# Patient Record
Sex: MALE | Race: WHITE | NOT HISPANIC OR LATINO | Employment: UNEMPLOYED | URBAN - METROPOLITAN AREA
[De-identification: names, ages, dates, MRNs, and addresses within clinical notes are randomized per-mention and may not be internally consistent; named-entity substitution may affect disease eponyms.]

---

## 2021-05-29 ENCOUNTER — RECORDS - HEALTHEAST (OUTPATIENT)
Dept: ADMINISTRATIVE | Facility: CLINIC | Age: 51
End: 2021-05-29

## 2022-02-09 ENCOUNTER — HOSPITAL ENCOUNTER (INPATIENT)
Facility: CLINIC | Age: 52
LOS: 7 days | Discharge: HOME HEALTH | DRG: 453 | End: 2022-02-16
Attending: EMERGENCY MEDICINE | Admitting: ORTHOPAEDIC SURGERY
Payer: COMMERCIAL

## 2022-02-09 ENCOUNTER — APPOINTMENT (OUTPATIENT)
Dept: GENERAL RADIOLOGY | Facility: CLINIC | Age: 52
DRG: 453 | End: 2022-02-09
Attending: ORTHOPAEDIC SURGERY
Payer: COMMERCIAL

## 2022-02-09 ENCOUNTER — ANESTHESIA EVENT (OUTPATIENT)
Dept: SURGERY | Facility: CLINIC | Age: 52
DRG: 453 | End: 2022-02-09
Payer: COMMERCIAL

## 2022-02-09 ENCOUNTER — ANESTHESIA (OUTPATIENT)
Dept: SURGERY | Facility: CLINIC | Age: 52
DRG: 453 | End: 2022-02-09
Payer: COMMERCIAL

## 2022-02-09 ENCOUNTER — TELEPHONE (OUTPATIENT)
Dept: ORTHOPEDICS | Facility: CLINIC | Age: 52
End: 2022-02-09

## 2022-02-09 DIAGNOSIS — M46.42 DISCITIS OF CERVICAL REGION: ICD-10-CM

## 2022-02-09 DIAGNOSIS — Z11.52 ENCOUNTER FOR SCREENING LABORATORY TESTING FOR SEVERE ACUTE RESPIRATORY SYNDROME CORONAVIRUS 2 (SARS-COV-2): ICD-10-CM

## 2022-02-09 DIAGNOSIS — G06.2 EPIDURAL ABSCESS: ICD-10-CM

## 2022-02-09 DIAGNOSIS — M47.12 CERVICAL SPONDYLOSIS WITH MYELOPATHY: ICD-10-CM

## 2022-02-09 DIAGNOSIS — M47.12 CERVICAL SPONDYLOSIS WITH MYELOPATHY: Primary | ICD-10-CM

## 2022-02-09 DIAGNOSIS — M46.42 CERVICAL DISCITIS: Primary | ICD-10-CM

## 2022-02-09 LAB
ABO/RH(D): NORMAL
ALBUMIN SERPL-MCNC: 2.8 G/DL (ref 3.4–5)
ALP SERPL-CCNC: 133 U/L (ref 40–150)
ALT SERPL W P-5'-P-CCNC: 44 U/L (ref 0–70)
ANION GAP SERPL CALCULATED.3IONS-SCNC: 5 MMOL/L (ref 3–14)
ANTIBODY SCREEN: NEGATIVE
AST SERPL W P-5'-P-CCNC: 12 U/L (ref 0–45)
BASOPHILS # BLD AUTO: 0.1 10E3/UL (ref 0–0.2)
BASOPHILS NFR BLD AUTO: 1 %
BILIRUB SERPL-MCNC: 0.3 MG/DL (ref 0.2–1.3)
BUN SERPL-MCNC: 12 MG/DL (ref 7–30)
CALCIUM SERPL-MCNC: 9.8 MG/DL (ref 8.5–10.1)
CHLORIDE BLD-SCNC: 102 MMOL/L (ref 94–109)
CO2 SERPL-SCNC: 30 MMOL/L (ref 20–32)
CREAT SERPL-MCNC: 0.83 MG/DL (ref 0.66–1.25)
CRP SERPL-MCNC: 74 MG/L (ref 0–8)
EOSINOPHIL # BLD AUTO: 0.2 10E3/UL (ref 0–0.7)
EOSINOPHIL NFR BLD AUTO: 1 %
ERYTHROCYTE [DISTWIDTH] IN BLOOD BY AUTOMATED COUNT: 12.7 % (ref 10–15)
GFR SERPL CREATININE-BSD FRML MDRD: >90 ML/MIN/1.73M2
GLUCOSE BLD-MCNC: 130 MG/DL (ref 70–99)
GLUCOSE BLDC GLUCOMTR-MCNC: 100 MG/DL (ref 70–99)
HCT VFR BLD AUTO: 38.5 % (ref 40–53)
HGB BLD-MCNC: 12.1 G/DL (ref 13.3–17.7)
HOLD SPECIMEN: NORMAL
IMM GRANULOCYTES # BLD: 0.1 10E3/UL
IMM GRANULOCYTES NFR BLD: 1 %
INR PPP: 0.98 (ref 0.85–1.15)
LACTATE SERPL-SCNC: 1.5 MMOL/L (ref 0.7–2)
LYMPHOCYTES # BLD AUTO: 3.8 10E3/UL (ref 0.8–5.3)
LYMPHOCYTES NFR BLD AUTO: 27 %
MCH RBC QN AUTO: 27.7 PG (ref 26.5–33)
MCHC RBC AUTO-ENTMCNC: 31.4 G/DL (ref 31.5–36.5)
MCV RBC AUTO: 88 FL (ref 78–100)
MONOCYTES # BLD AUTO: 1 10E3/UL (ref 0–1.3)
MONOCYTES NFR BLD AUTO: 7 %
NEUTROPHILS # BLD AUTO: 8.8 10E3/UL (ref 1.6–8.3)
NEUTROPHILS NFR BLD AUTO: 63 %
NRBC # BLD AUTO: 0 10E3/UL
NRBC BLD AUTO-RTO: 0 /100
PLATELET # BLD AUTO: 733 10E3/UL (ref 150–450)
POTASSIUM BLD-SCNC: 3.4 MMOL/L (ref 3.4–5.3)
PROCALCITONIN SERPL-MCNC: 0.06 NG/ML
PROT SERPL-MCNC: 8.5 G/DL (ref 6.8–8.8)
RBC # BLD AUTO: 4.37 10E6/UL (ref 4.4–5.9)
SARS-COV-2 RNA RESP QL NAA+PROBE: NEGATIVE
SODIUM SERPL-SCNC: 137 MMOL/L (ref 133–144)
SPECIMEN EXPIRATION DATE: NORMAL
WBC # BLD AUTO: 13.9 10E3/UL (ref 4–11)

## 2022-02-09 PROCEDURE — 272N000001 HC OR GENERAL SUPPLY STERILE: Performed by: ORTHOPAEDIC SURGERY

## 2022-02-09 PROCEDURE — 0RB30ZZ EXCISION OF CERVICAL VERTEBRAL DISC, OPEN APPROACH: ICD-10-PCS | Performed by: ORTHOPAEDIC SURGERY

## 2022-02-09 PROCEDURE — 80053 COMPREHEN METABOLIC PANEL: CPT | Performed by: EMERGENCY MEDICINE

## 2022-02-09 PROCEDURE — 36415 COLL VENOUS BLD VENIPUNCTURE: CPT | Performed by: EMERGENCY MEDICINE

## 2022-02-09 PROCEDURE — 87077 CULTURE AEROBIC IDENTIFY: CPT | Performed by: EMERGENCY MEDICINE

## 2022-02-09 PROCEDURE — 710N000010 HC RECOVERY PHASE 1, LEVEL 2, PER MIN: Performed by: ORTHOPAEDIC SURGERY

## 2022-02-09 PROCEDURE — 0RG60A0 FUSION OF THORACIC VERTEBRAL JOINT WITH INTERBODY FUSION DEVICE, ANTERIOR APPROACH, ANTERIOR COLUMN, OPEN APPROACH: ICD-10-PCS | Performed by: ORTHOPAEDIC SURGERY

## 2022-02-09 PROCEDURE — 999N000141 HC STATISTIC PRE-PROCEDURE NURSING ASSESSMENT: Performed by: ORTHOPAEDIC SURGERY

## 2022-02-09 PROCEDURE — 36415 COLL VENOUS BLD VENIPUNCTURE: CPT | Performed by: ANESTHESIOLOGY

## 2022-02-09 PROCEDURE — 0RG10A0 FUSION OF CERVICAL VERTEBRAL JOINT WITH INTERBODY FUSION DEVICE, ANTERIOR APPROACH, ANTERIOR COLUMN, OPEN APPROACH: ICD-10-PCS | Performed by: ORTHOPAEDIC SURGERY

## 2022-02-09 PROCEDURE — 87149 DNA/RNA DIRECT PROBE: CPT | Performed by: EMERGENCY MEDICINE

## 2022-02-09 PROCEDURE — 999N000179 XR SURGERY CARM FLUORO LESS THAN 5 MIN W STILLS

## 2022-02-09 PROCEDURE — 22216 INCIS ADDL SPINE SEGMENT: CPT | Mod: 58 | Performed by: ORTHOPAEDIC SURGERY

## 2022-02-09 PROCEDURE — 250N000011 HC RX IP 250 OP 636: Performed by: PHYSICIAN ASSISTANT

## 2022-02-09 PROCEDURE — 83605 ASSAY OF LACTIC ACID: CPT | Performed by: PHYSICIAN ASSISTANT

## 2022-02-09 PROCEDURE — 85610 PROTHROMBIN TIME: CPT | Performed by: STUDENT IN AN ORGANIZED HEALTH CARE EDUCATION/TRAINING PROGRAM

## 2022-02-09 PROCEDURE — 258N000003 HC RX IP 258 OP 636: Performed by: PHYSICIAN ASSISTANT

## 2022-02-09 PROCEDURE — 250N000011 HC RX IP 250 OP 636: Performed by: ANESTHESIOLOGY

## 2022-02-09 PROCEDURE — 0RB50ZZ EXCISION OF CERVICOTHORACIC VERTEBRAL DISC, OPEN APPROACH: ICD-10-PCS | Performed by: ORTHOPAEDIC SURGERY

## 2022-02-09 PROCEDURE — C1713 ANCHOR/SCREW BN/BN,TIS/BN: HCPCS | Performed by: ORTHOPAEDIC SURGERY

## 2022-02-09 PROCEDURE — 96374 THER/PROPH/DIAG INJ IV PUSH: CPT

## 2022-02-09 PROCEDURE — 258N000003 HC RX IP 258 OP 636: Performed by: STUDENT IN AN ORGANIZED HEALTH CARE EDUCATION/TRAINING PROGRAM

## 2022-02-09 PROCEDURE — 360N000078 HC SURGERY LEVEL 5, PER MIN: Performed by: ORTHOPAEDIC SURGERY

## 2022-02-09 PROCEDURE — 272N000002 HC OR SUPPLY OTHER OPNP: Performed by: ORTHOPAEDIC SURGERY

## 2022-02-09 PROCEDURE — 86850 RBC ANTIBODY SCREEN: CPT | Performed by: ANESTHESIOLOGY

## 2022-02-09 PROCEDURE — 250N000009 HC RX 250: Performed by: STUDENT IN AN ORGANIZED HEALTH CARE EDUCATION/TRAINING PROGRAM

## 2022-02-09 PROCEDURE — 85025 COMPLETE CBC W/AUTO DIFF WBC: CPT | Performed by: EMERGENCY MEDICINE

## 2022-02-09 PROCEDURE — 120N000002 HC R&B MED SURG/OB UMMC

## 2022-02-09 PROCEDURE — C9803 HOPD COVID-19 SPEC COLLECT: HCPCS

## 2022-02-09 PROCEDURE — 87176 TISSUE HOMOGENIZATION CULTR: CPT | Performed by: ORTHOPAEDIC SURGERY

## 2022-02-09 PROCEDURE — 87075 CULTR BACTERIA EXCEPT BLOOD: CPT | Performed by: ORTHOPAEDIC SURGERY

## 2022-02-09 PROCEDURE — 96376 TX/PRO/DX INJ SAME DRUG ADON: CPT

## 2022-02-09 PROCEDURE — 250N000013 HC RX MED GY IP 250 OP 250 PS 637: Performed by: ORTHOPAEDIC SURGERY

## 2022-02-09 PROCEDURE — 250N000025 HC SEVOFLURANE, PER MIN: Performed by: ORTHOPAEDIC SURGERY

## 2022-02-09 PROCEDURE — 96375 TX/PRO/DX INJ NEW DRUG ADDON: CPT

## 2022-02-09 PROCEDURE — 370N000017 HC ANESTHESIA TECHNICAL FEE, PER MIN: Performed by: ORTHOPAEDIC SURGERY

## 2022-02-09 PROCEDURE — 36415 COLL VENOUS BLD VENIPUNCTURE: CPT | Performed by: PHYSICIAN ASSISTANT

## 2022-02-09 PROCEDURE — 99285 EMERGENCY DEPT VISIT HI MDM: CPT | Performed by: EMERGENCY MEDICINE

## 2022-02-09 PROCEDURE — 0QB20ZZ EXCISION OF RIGHT PELVIC BONE, OPEN APPROACH: ICD-10-PCS | Performed by: ORTHOPAEDIC SURGERY

## 2022-02-09 PROCEDURE — 87077 CULTURE AEROBIC IDENTIFY: CPT | Performed by: ORTHOPAEDIC SURGERY

## 2022-02-09 PROCEDURE — 0RG40A0 FUSION OF CERVICOTHORACIC VERTEBRAL JOINT WITH INTERBODY FUSION DEVICE, ANTERIOR APPROACH, ANTERIOR COLUMN, OPEN APPROACH: ICD-10-PCS | Performed by: ORTHOPAEDIC SURGERY

## 2022-02-09 PROCEDURE — 99285 EMERGENCY DEPT VISIT HI MDM: CPT | Mod: 25

## 2022-02-09 PROCEDURE — 87070 CULTURE OTHR SPECIMN AEROBIC: CPT | Performed by: ORTHOPAEDIC SURGERY

## 2022-02-09 PROCEDURE — 250N000011 HC RX IP 250 OP 636: Performed by: STUDENT IN AN ORGANIZED HEALTH CARE EDUCATION/TRAINING PROGRAM

## 2022-02-09 PROCEDURE — 87205 SMEAR GRAM STAIN: CPT | Performed by: ORTHOPAEDIC SURGERY

## 2022-02-09 PROCEDURE — U0003 INFECTIOUS AGENT DETECTION BY NUCLEIC ACID (DNA OR RNA); SEVERE ACUTE RESPIRATORY SYNDROME CORONAVIRUS 2 (SARS-COV-2) (CORONAVIRUS DISEASE [COVID-19]), AMPLIFIED PROBE TECHNIQUE, MAKING USE OF HIGH THROUGHPUT TECHNOLOGIES AS DESCRIBED BY CMS-2020-01-R: HCPCS | Performed by: EMERGENCY MEDICINE

## 2022-02-09 PROCEDURE — 86140 C-REACTIVE PROTEIN: CPT | Performed by: PHYSICIAN ASSISTANT

## 2022-02-09 PROCEDURE — 250N000011 HC RX IP 250 OP 636: Performed by: EMERGENCY MEDICINE

## 2022-02-09 PROCEDURE — 0P933ZX DRAINAGE OF CERVICAL VERTEBRA, PERCUTANEOUS APPROACH, DIAGNOSTIC: ICD-10-PCS | Performed by: ORTHOPAEDIC SURGERY

## 2022-02-09 PROCEDURE — 258N000003 HC RX IP 258 OP 636: Performed by: EMERGENCY MEDICINE

## 2022-02-09 PROCEDURE — 0RB90ZZ EXCISION OF THORACIC VERTEBRAL DISC, OPEN APPROACH: ICD-10-PCS | Performed by: ORTHOPAEDIC SURGERY

## 2022-02-09 PROCEDURE — 84145 PROCALCITONIN (PCT): CPT | Performed by: PHYSICIAN ASSISTANT

## 2022-02-09 RX ORDER — CEFAZOLIN SODIUM 1 G/50ML
2 SOLUTION INTRAVENOUS
Status: CANCELLED | OUTPATIENT
Start: 2022-02-09

## 2022-02-09 RX ORDER — DEXTROAMPHETAMINE SACCHARATE, AMPHETAMINE ASPARTATE MONOHYDRATE, DEXTROAMPHETAMINE SULFATE AND AMPHETAMINE SULFATE 7.5; 7.5; 7.5; 7.5 MG/1; MG/1; MG/1; MG/1
30 CAPSULE, EXTENDED RELEASE ORAL EVERY MORNING
COMMUNITY
Start: 2021-12-29

## 2022-02-09 RX ORDER — IOPAMIDOL 755 MG/ML
75 INJECTION, SOLUTION INTRAVASCULAR ONCE
Status: DISCONTINUED | OUTPATIENT
Start: 2022-02-09 | End: 2022-02-10

## 2022-02-09 RX ORDER — LORAZEPAM 2 MG/ML
1 INJECTION INTRAMUSCULAR ONCE
Status: DISCONTINUED | OUTPATIENT
Start: 2022-02-09 | End: 2022-02-10

## 2022-02-09 RX ORDER — GABAPENTIN 300 MG/1
300 CAPSULE ORAL
Status: CANCELLED | OUTPATIENT
Start: 2022-02-09

## 2022-02-09 RX ORDER — CEFAZOLIN SODIUM 2 G/100ML
INJECTION, SOLUTION INTRAVENOUS PRN
Status: DISCONTINUED | OUTPATIENT
Start: 2022-02-09 | End: 2022-02-09

## 2022-02-09 RX ORDER — ACETAMINOPHEN 325 MG/1
975 TABLET ORAL ONCE
Status: COMPLETED | OUTPATIENT
Start: 2022-02-09 | End: 2022-02-09

## 2022-02-09 RX ORDER — DEXAMETHASONE SODIUM PHOSPHATE 4 MG/ML
INJECTION, SOLUTION INTRA-ARTICULAR; INTRALESIONAL; INTRAMUSCULAR; INTRAVENOUS; SOFT TISSUE PRN
Status: DISCONTINUED | OUTPATIENT
Start: 2022-02-09 | End: 2022-02-10

## 2022-02-09 RX ORDER — FENTANYL CITRATE 50 UG/ML
25 INJECTION, SOLUTION INTRAMUSCULAR; INTRAVENOUS EVERY 5 MIN PRN
Status: DISCONTINUED | OUTPATIENT
Start: 2022-02-09 | End: 2022-02-10 | Stop reason: HOSPADM

## 2022-02-09 RX ORDER — HYDROMORPHONE HCL IN WATER/PF 6 MG/30 ML
0.5 PATIENT CONTROLLED ANALGESIA SYRINGE INTRAVENOUS
Status: COMPLETED | OUTPATIENT
Start: 2022-02-09 | End: 2022-02-09

## 2022-02-09 RX ORDER — OXYCODONE HYDROCHLORIDE 5 MG/1
5 TABLET ORAL EVERY 4 HOURS PRN
Status: DISCONTINUED | OUTPATIENT
Start: 2022-02-09 | End: 2022-02-10 | Stop reason: HOSPADM

## 2022-02-09 RX ORDER — LIDOCAINE HYDROCHLORIDE 20 MG/ML
INJECTION, SOLUTION INFILTRATION; PERINEURAL PRN
Status: DISCONTINUED | OUTPATIENT
Start: 2022-02-09 | End: 2022-02-10

## 2022-02-09 RX ORDER — FENTANYL CITRATE 50 UG/ML
100 INJECTION, SOLUTION INTRAMUSCULAR; INTRAVENOUS ONCE
Status: COMPLETED | OUTPATIENT
Start: 2022-02-09 | End: 2022-02-09

## 2022-02-09 RX ORDER — HYDROMORPHONE HCL IN WATER/PF 6 MG/30 ML
0.2 PATIENT CONTROLLED ANALGESIA SYRINGE INTRAVENOUS EVERY 5 MIN PRN
Status: DISCONTINUED | OUTPATIENT
Start: 2022-02-09 | End: 2022-02-10 | Stop reason: HOSPADM

## 2022-02-09 RX ORDER — FENTANYL CITRATE 50 UG/ML
50 INJECTION, SOLUTION INTRAMUSCULAR; INTRAVENOUS ONCE
Status: COMPLETED | OUTPATIENT
Start: 2022-02-09 | End: 2022-02-09

## 2022-02-09 RX ORDER — PROPOFOL 10 MG/ML
INJECTION, EMULSION INTRAVENOUS PRN
Status: DISCONTINUED | OUTPATIENT
Start: 2022-02-09 | End: 2022-02-10

## 2022-02-09 RX ORDER — LORAZEPAM 2 MG/ML
1 INJECTION INTRAMUSCULAR ONCE
Status: COMPLETED | OUTPATIENT
Start: 2022-02-09 | End: 2022-02-09

## 2022-02-09 RX ORDER — LIDOCAINE 40 MG/G
CREAM TOPICAL
Status: CANCELLED | OUTPATIENT
Start: 2022-02-09

## 2022-02-09 RX ORDER — GABAPENTIN 300 MG/1
300 CAPSULE ORAL 3 TIMES DAILY
Status: DISCONTINUED | OUTPATIENT
Start: 2022-02-09 | End: 2022-02-10

## 2022-02-09 RX ORDER — SODIUM CHLORIDE, SODIUM LACTATE, POTASSIUM CHLORIDE, CALCIUM CHLORIDE 600; 310; 30; 20 MG/100ML; MG/100ML; MG/100ML; MG/100ML
INJECTION, SOLUTION INTRAVENOUS CONTINUOUS PRN
Status: DISCONTINUED | OUTPATIENT
Start: 2022-02-09 | End: 2022-02-10

## 2022-02-09 RX ORDER — SODIUM CHLORIDE 9 MG/ML
INJECTION, SOLUTION INTRAVENOUS CONTINUOUS
Status: DISCONTINUED | OUTPATIENT
Start: 2022-02-09 | End: 2022-02-10

## 2022-02-09 RX ORDER — LABETALOL HYDROCHLORIDE 5 MG/ML
10 INJECTION, SOLUTION INTRAVENOUS
Status: DISCONTINUED | OUTPATIENT
Start: 2022-02-09 | End: 2022-02-10 | Stop reason: HOSPADM

## 2022-02-09 RX ORDER — CEFAZOLIN SODIUM 2 G/100ML
INJECTION, SOLUTION INTRAVENOUS PRN
Status: DISCONTINUED | OUTPATIENT
Start: 2022-02-09 | End: 2022-02-10

## 2022-02-09 RX ORDER — ONDANSETRON 2 MG/ML
4 INJECTION INTRAMUSCULAR; INTRAVENOUS EVERY 30 MIN PRN
Status: DISCONTINUED | OUTPATIENT
Start: 2022-02-09 | End: 2022-02-10 | Stop reason: HOSPADM

## 2022-02-09 RX ORDER — ACETAMINOPHEN 325 MG/1
975 TABLET ORAL ONCE
Status: CANCELLED | OUTPATIENT
Start: 2022-02-09 | End: 2022-02-09

## 2022-02-09 RX ORDER — ONDANSETRON 4 MG/1
4 TABLET, ORALLY DISINTEGRATING ORAL EVERY 30 MIN PRN
Status: DISCONTINUED | OUTPATIENT
Start: 2022-02-09 | End: 2022-02-10 | Stop reason: HOSPADM

## 2022-02-09 RX ORDER — PROPOFOL 10 MG/ML
INJECTION, EMULSION INTRAVENOUS CONTINUOUS PRN
Status: DISCONTINUED | OUTPATIENT
Start: 2022-02-09 | End: 2022-02-10

## 2022-02-09 RX ORDER — DIAZEPAM 10 MG/2ML
5 INJECTION, SOLUTION INTRAMUSCULAR; INTRAVENOUS ONCE
Status: COMPLETED | OUTPATIENT
Start: 2022-02-09 | End: 2022-02-09

## 2022-02-09 RX ORDER — KETAMINE HYDROCHLORIDE 10 MG/ML
INJECTION INTRAMUSCULAR; INTRAVENOUS PRN
Status: DISCONTINUED | OUTPATIENT
Start: 2022-02-09 | End: 2022-02-10

## 2022-02-09 RX ORDER — CEFAZOLIN SODIUM 1 G/50ML
2 SOLUTION INTRAVENOUS SEE ADMIN INSTRUCTIONS
Status: CANCELLED | OUTPATIENT
Start: 2022-02-09

## 2022-02-09 RX ORDER — SODIUM CHLORIDE, SODIUM LACTATE, POTASSIUM CHLORIDE, CALCIUM CHLORIDE 600; 310; 30; 20 MG/100ML; MG/100ML; MG/100ML; MG/100ML
INJECTION, SOLUTION INTRAVENOUS CONTINUOUS
Status: DISCONTINUED | OUTPATIENT
Start: 2022-02-09 | End: 2022-02-10 | Stop reason: HOSPADM

## 2022-02-09 RX ORDER — MORPHINE SULFATE 2 MG/ML
2 INJECTION, SOLUTION INTRAMUSCULAR; INTRAVENOUS ONCE
Status: COMPLETED | OUTPATIENT
Start: 2022-02-09 | End: 2022-02-09

## 2022-02-09 RX ORDER — FENTANYL CITRATE 50 UG/ML
50 INJECTION, SOLUTION INTRAMUSCULAR; INTRAVENOUS
Status: DISCONTINUED | OUTPATIENT
Start: 2022-02-09 | End: 2022-02-10

## 2022-02-09 RX ORDER — SODIUM CHLORIDE, SODIUM LACTATE, POTASSIUM CHLORIDE, CALCIUM CHLORIDE 600; 310; 30; 20 MG/100ML; MG/100ML; MG/100ML; MG/100ML
INJECTION, SOLUTION INTRAVENOUS CONTINUOUS
Status: CANCELLED | OUTPATIENT
Start: 2022-02-09

## 2022-02-09 RX ORDER — CYCLOBENZAPRINE HCL 10 MG
30 TABLET ORAL DAILY
Status: ON HOLD | COMMUNITY
Start: 2022-01-03 | End: 2022-02-10

## 2022-02-09 RX ADMIN — HYDROMORPHONE HYDROCHLORIDE 0.5 MG: 0.2 INJECTION, SOLUTION INTRAMUSCULAR; INTRAVENOUS; SUBCUTANEOUS at 16:34

## 2022-02-09 RX ADMIN — FENTANYL CITRATE 50 MCG: 50 INJECTION, SOLUTION INTRAMUSCULAR; INTRAVENOUS at 21:27

## 2022-02-09 RX ADMIN — LIDOCAINE HYDROCHLORIDE 100 MG: 20 INJECTION, SOLUTION INFILTRATION; PERINEURAL at 22:19

## 2022-02-09 RX ADMIN — ACETAMINOPHEN 975 MG: 325 TABLET, FILM COATED ORAL at 21:59

## 2022-02-09 RX ADMIN — LORAZEPAM 1 MG: 2 INJECTION INTRAMUSCULAR; INTRAVENOUS at 18:36

## 2022-02-09 RX ADMIN — MORPHINE SULFATE 2 MG: 2 INJECTION, SOLUTION INTRAMUSCULAR; INTRAVENOUS at 16:05

## 2022-02-09 RX ADMIN — DIAZEPAM 5 MG: 5 INJECTION, SOLUTION INTRAMUSCULAR; INTRAVENOUS at 16:32

## 2022-02-09 RX ADMIN — SODIUM CHLORIDE 1000 ML: 9 INJECTION, SOLUTION INTRAVENOUS at 17:29

## 2022-02-09 RX ADMIN — DEXAMETHASONE SODIUM PHOSPHATE 4 MG: 4 INJECTION, SOLUTION INTRA-ARTICULAR; INTRALESIONAL; INTRAMUSCULAR; INTRAVENOUS; SOFT TISSUE at 23:15

## 2022-02-09 RX ADMIN — FENTANYL CITRATE 50 MCG: 50 INJECTION, SOLUTION INTRAMUSCULAR; INTRAVENOUS at 17:34

## 2022-02-09 RX ADMIN — HYDROMORPHONE HYDROCHLORIDE 1 MG: 1 INJECTION, SOLUTION INTRAMUSCULAR; INTRAVENOUS; SUBCUTANEOUS at 18:30

## 2022-02-09 RX ADMIN — SUFENTANIL CITRATE 0.05 MCG/KG/HR: 50 INJECTION EPIDURAL; INTRAVENOUS at 22:22

## 2022-02-09 RX ADMIN — TRANEXAMIC ACID 2 G: 1 INJECTION, SOLUTION INTRAVENOUS at 23:35

## 2022-02-09 RX ADMIN — Medication 2 G: at 23:52

## 2022-02-09 RX ADMIN — Medication 100 MG: at 22:19

## 2022-02-09 RX ADMIN — FENTANYL CITRATE 100 MCG: 50 INJECTION, SOLUTION INTRAMUSCULAR; INTRAVENOUS at 19:57

## 2022-02-09 RX ADMIN — SODIUM CHLORIDE, POTASSIUM CHLORIDE, SODIUM LACTATE AND CALCIUM CHLORIDE: 600; 310; 30; 20 INJECTION, SOLUTION INTRAVENOUS at 22:14

## 2022-02-09 RX ADMIN — GABAPENTIN 300 MG: 300 CAPSULE ORAL at 21:59

## 2022-02-09 RX ADMIN — PHENYLEPHRINE HYDROCHLORIDE 0.3 MCG/KG/MIN: 10 INJECTION INTRAVENOUS at 23:15

## 2022-02-09 RX ADMIN — SODIUM CHLORIDE 1000 ML: 9 INJECTION, SOLUTION INTRAVENOUS at 16:07

## 2022-02-09 RX ADMIN — PROPOFOL 150 MG: 10 INJECTION, EMULSION INTRAVENOUS at 22:19

## 2022-02-09 RX ADMIN — FENTANYL CITRATE 150 MCG: 50 INJECTION, SOLUTION INTRAMUSCULAR; INTRAVENOUS at 22:19

## 2022-02-09 RX ADMIN — Medication 10 MG: at 23:13

## 2022-02-09 RX ADMIN — PROPOFOL 150 MCG/KG/MIN: 10 INJECTION, EMULSION INTRAVENOUS at 22:22

## 2022-02-09 RX ADMIN — FENTANYL CITRATE 100 MCG: 50 INJECTION, SOLUTION INTRAMUSCULAR; INTRAVENOUS at 23:08

## 2022-02-09 RX ADMIN — FENTANYL CITRATE 50 MCG: 50 INJECTION, SOLUTION INTRAMUSCULAR; INTRAVENOUS at 21:40

## 2022-02-09 RX ADMIN — TRANEXAMIC ACID 80 MG/HR: 1 INJECTION, SOLUTION INTRAVENOUS at 23:41

## 2022-02-09 ASSESSMENT — ENCOUNTER SYMPTOMS
NUMBNESS: 1
FEVER: 0
BACK PAIN: 1
CHILLS: 0
SHORTNESS OF BREATH: 1

## 2022-02-09 ASSESSMENT — ACTIVITIES OF DAILY LIVING (ADL)
ADLS_ACUITY_SCORE: 7
ADLS_ACUITY_SCORE: 11
ADLS_ACUITY_SCORE: 11

## 2022-02-09 ASSESSMENT — MIFFLIN-ST. JEOR: SCORE: 1639.64

## 2022-02-09 NOTE — ED TRIAGE NOTES
Pt c/o neck and upper back pain since Jan 3rd, severe pain w/ no relief. Feels like something is stuck in throat (sating 100% room air, handling secretions well)    Had MRI [facility]  *Was told he has an infection on spine, and to present to ER to be admitted.*     Denies chest pain, SOB

## 2022-02-09 NOTE — LETTER
Transition Communication Hand-off for Care Transitions to Next Level of Care Provider    Name: Dave Calero  : 1970  MRN #: [id number]  Primary Care Provider: Maurisio Araiza     [facility] [address]     Reason for Hospitalization:  Discitis of cervical region [M46.42]  Epidural abscess [G06.2]  Cervical spondylosis with myelopathy [M47.12]  Admit Date/Time: 2022  3:03 PM  Discharge Date: 2022  Payor Source: Payor: [employer] / Plan: [employer] OPEN ACCESS / Product Type: HMO /                  Reason for Communication Hand-off Referral: Other dc with IV ABX    Discharge Plan: IV ABX       Concern for non-adherence with plan of care:   Y/N No  Discharge Needs Assessment:  Needs      Most Recent Value   Equipment Currently Used at Home none   # of Referrals Placed by [facility]              Follow-up plan:    Future Appointments   Date Time Provider Department Center   3/10/2022  2:30 PM Nazario Tapia MD [facility]   3/24/2022  9:40 AM Kulwinder Calixto MD [facility]       Any outstanding tests or procedures:        Referrals     Future Labs/Procedures    Home Infusion Referral     Comments:    _______________________________  [facility]  Phone  [phone]  Fax  [phone]    PICC Line Cares    RN to provide lab draws as needed and communicate results to ID            Key Recommendations:      Dayana Armstrong RN    AVS/Discharge Summary is the source of truth; this is a helpful guide for improved communication of patient story

## 2022-02-09 NOTE — TELEPHONE ENCOUNTER
[facility] Call Center    Phone Message    May a detailed message be left on voicemail: yes     Reason for Call: Other: please call Dr Monzon from [facility] regarding this patient that is a potential referral  DR Burr cell phone is [phone]    Action Taken: Other: ortho

## 2022-02-09 NOTE — ED PROVIDER NOTES
"ED Triage Provider Note  [facility]  Encounter Date: Feb 9, 2022    History:  Chief Complaint   Patient presents with     Back Pain     Dave Caelro is a 51 year old male who presents to the ED with thoracic back pain and concern for infection. Patient is here with his wife.  He reports in early January he injured his neck while twisting it in a game of poker.  Subsequently he had an ER workup with normal xrays per patient.  He tells me had had tried chiropractor, cupping, and other conservative measures without much benefit.  Today he had an MRI ordered by orthopedics which was concerning for \"infection\".  He denies any fever, SOB, Chest pain.  No loss of bladder bowel function saddle anesthesia, hx MRSA or IV drug use.     Review of Systems:  Negative for fever    Exam:  /84   Pulse 117   Temp 97.6  F (36.4  C) (Oral)   Resp 18   SpO2 98%   General: No acute distress. Appears stated age.   Cardio: Regular rate, extremities well perfused  Resp: Normal work of breathing, grossly normal respiratory rate  Neuro: Alert. CN II-XII grossly intact. Grossly intact strength.   Back:  Mild focal tenderness at midline and lateral sides of thoracic  spine.  Skin without erythema or lesions, swelling.    Medical Decision Making:  Patient arriving to the ED with problem as above. A medical screening exam was performed. CBC. Blood cultures, CMP, CRR, lactate, procalcitonin orders initiated from Triage.  Morphine orders placed pending patient getting a bed. The patient is appropriate to wait in triage.      Kiara Alcazar PA-C on 2/9/2022 at 3:02 PM     Kiara Alcazar PA-C  02/09/22 2048    "

## 2022-02-09 NOTE — ED PROVIDER NOTES
"    [place] EMERGENCY DEPARTMENT ([facility])  2/09/22  History     Chief Complaint   Patient presents with     Back Pain     The history is provided by the patient and medical records.     Dave Calero is a 51 year old who presents to the Emergency Department via referral from [facility] due to abnormal finding on MRI as well as worsening back pain.  Patient reports he has had worsening back pain since 1/3/2022.  Patient was initially worked up with a thoracic and lumbar CT in January that initially looked okay.  Impression of the CT as noted below.  Patient reports he has since been worked up at [facility] and did have an MRI done yesterday.  He reports that they went back into the office today to discuss results.  Patient reports the orthopedist told him that the results were fairly concerning, and he needed to go seek ED evaluation and possible admission.  It appears MRI read consistent with epidural abscess and discitis.  And regards the patient's symptoms, he reports most of the pain is located in the mid-upper back, over the spine.  He denies any fevers or chills.  He does have some shortness of breath secondary to the worsening pain.  His home medications currently include Flexeril and Percocet which have not been beneficial.  He reports he is able to move his legs okay.  He reports different positions will sometimes give him \"spasms\" into his upper back.  Patient denies any numbness or tingling into his groin.  He does endorse intermittent feelings of numbness in his anterior forearms.  Patient denies any known allergies.    Per chart review, patient was seen at [facility] on 01/18/2022 for evaluation of upper back pain.  Patient reported that he has been experiencing upper back pain between his shoulder blades that occurred after turning suddenly. Patient describes this pain as a stabbing pain that is constant. Patient reported that he has been to a " chiropractor and had acupuncture 4 times. Patient stated that he has not slept in 1.5 weeks because he cannot lay down due to pain. Patient states that he has been taking cyclobenzaprine for sleep but has been causing him to talk in his sleep and experience hallucinations.  Patient noted that he is taking Percocet which has not been helpful and has also been taking ibuprofen which has provided no relief to his symptoms. UR with CT lumbar and thoracic spine without any concerning acute findings, D-dimer was negative.  Patient symptoms were found to be consistent with acute muscle spasms.  Due to severity of symptoms patient was given 5 tablets of diazepam 5 mg and treated with baclofen 10 mg, and Tylenol/acetaminophen, and Meloxicam 50 mg.    [place] (1/3/2022)  THORACIC SPINE CT:   IMPRESSION  1.  Mild spondylosis with foraminal stenoses as detailed.   2.  No endplate erosions or paraspinal inflammatory process.     LUMBAR SPINE CT:   IMPRESSION  1.  Transitional lumbarized S1 segment.   2.  Mild spondylosis with mild foraminal narrowing at L5-S1.   3.  No endplate erosions or paraspinal inflammatory process.     Past Medical History  No past medical history on file.  No past surgical history on file.  No current outpatient medications on file.    No Known Allergies  Family History  No family history on file.  Social History   Social History     Tobacco Use     Smoking status: Not on file     Smokeless tobacco: Not on file   Substance Use Topics     Alcohol use: Not on file     Drug use: Not on file      Past medical history, past surgical history, medications, allergies, family history, and social history were reviewed with the patient. No additional pertinent items.     I have reviewed the Medications, Allergies, Past Medical and Surgical History, and Social History in the Epic system.    Review of Systems   Constitutional: Negative for chills and fever.   Respiratory: Positive for shortness of breath (2/2  pain).    Musculoskeletal: Positive for back pain (Mid-upper, over spine).   Neurological: Positive for numbness (Anterior forearm b/l).   All other systems reviewed and are negative.      Physical Exam   BP: 135/84  Pulse: 117  Temp: 97.6  F (36.4  C)  Resp: 18  SpO2: 98 %      Physical Exam  Vitals and nursing note reviewed.   Constitutional:       General: He is not in acute distress.     Appearance: He is well-developed. He is ill-appearing. He is not toxic-appearing.      Comments: Sitting straight up in bed.  Unable to lay down due to pain.  Moving bilateral legs.  Answer questions fully.   HENT:      Head: Normocephalic and atraumatic.   Cardiovascular:      Rate and Rhythm: Regular rhythm. Tachycardia present.      Heart sounds: Normal heart sounds.   Pulmonary:      Effort: Pulmonary effort is normal. No respiratory distress.      Breath sounds: No wheezing.   Abdominal:      General: There is no distension.      Palpations: Abdomen is soft.      Tenderness: There is no abdominal tenderness. There is no rebound.   Genitourinary:     Comments: Normal sensation in the groin region.  Musculoskeletal:      Cervical back: Normal range of motion and neck supple.      Comments: Tenderness in the lower cervical spine in the upper thoracic region midline of the back.  Unable to lay down.  Moving all 4 extremities.   Skin:     General: Skin is warm.   Neurological:      Mental Status: He is alert and oriented to person, place, and time.      Comments: Patient with some decreased sensation to light touch in bilateral forearms.  Able to feel light touch though he says it feels different.  Normal sensation on the dorsum of bilateral arms and in the hands.  Normal sensation in the legs and the feet.   Psychiatric:         Behavior: Behavior normal.         Thought Content: Thought content normal.         ED Course     At 3:46 PM the patient was seen and examined by Alexandra Haynes MD in Room ED26.      Procedures    Results for orders placed or performed during the hospital encounter of 02/09/22   Comprehensive metabolic panel     Status: Abnormal   Result Value Ref Range    Sodium 137 133 - 144 mmol/L    Potassium 3.4 3.4 - 5.3 mmol/L    Chloride 102 94 - 109 mmol/L    Carbon Dioxide (CO2) 30 20 - 32 mmol/L    Anion Gap 5 3 - 14 mmol/L    Urea Nitrogen 12 7 - 30 mg/dL    Creatinine 0.83 0.66 - 1.25 mg/dL    Calcium 9.8 8.5 - 10.1 mg/dL    Glucose 130 (H) 70 - 99 mg/dL    Alkaline Phosphatase 133 40 - 150 U/L    AST 12 0 - 45 U/L    ALT 44 0 - 70 U/L    Protein Total 8.5 6.8 - 8.8 g/dL    Albumin 2.8 (L) 3.4 - 5.0 g/dL    Bilirubin Total 0.3 0.2 - 1.3 mg/dL    GFR Estimate >90 >60 mL/min/1.73m2   CBC with platelets and differential     Status: Abnormal   Result Value Ref Range    WBC Count 13.9 (H) 4.0 - 11.0 10e3/uL    RBC Count 4.37 (L) 4.40 - 5.90 10e6/uL    Hemoglobin 12.1 (L) 13.3 - 17.7 g/dL    Hematocrit 38.5 (L) 40.0 - 53.0 %    MCV 88 78 - 100 fL    MCH 27.7 26.5 - 33.0 pg    MCHC 31.4 (L) 31.5 - 36.5 g/dL    RDW 12.7 10.0 - 15.0 %    Platelet Count 733 (H) 150 - 450 10e3/uL    % Neutrophils 63 %    % Lymphocytes 27 %    % Monocytes 7 %    % Eosinophils 1 %    % Basophils 1 %    % Immature Granulocytes 1 %    NRBCs per 100 WBC 0 <1 /100    Absolute Neutrophils 8.8 (H) 1.6 - 8.3 10e3/uL    Absolute Lymphocytes 3.8 0.8 - 5.3 10e3/uL    Absolute Monocytes 1.0 0.0 - 1.3 10e3/uL    Absolute Eosinophils 0.2 0.0 - 0.7 10e3/uL    Absolute Basophils 0.1 0.0 - 0.2 10e3/uL    Absolute Immature Granulocytes 0.1 <=0.4 10e3/uL    Absolute NRBCs 0.0 10e3/uL   Extra Blood Culture Bottle     Status: None   Result Value Ref Range    Hold Specimen JIC    Extra Blue Top Tube     Status: None   Result Value Ref Range    Hold Specimen JIC    Extra Red Top Tube     Status: None   Result Value Ref Range    Hold Specimen JIC    Lactic acid whole blood     Status: Normal   Result Value Ref Range    Lactic Acid 1.5 0.7 - 2.0  mmol/L   Procalcitonin     Status: Abnormal   Result Value Ref Range    Procalcitonin 0.06 (H) <0.05 ng/mL   CRP inflammation     Status: Abnormal   Result Value Ref Range    CRP Inflammation 74.0 (H) 0.0 - 8.0 mg/L   Asymptomatic COVID-19 Virus (Coronavirus) by PCR Nasopharyngeal     Status: Normal    Specimen: Nasopharyngeal; Swab   Result Value Ref Range    SARS CoV2 PCR Negative Negative, Testing sent to reference lab. Results will be returned via unsolicited result    Narrative    Testing was performed using the Xpert Xpress SARS-CoV-2 Assay on the  Cepheid Gene-Xpert Instrument Systems. Additional information about  this Emergency Use Authorization (EUA) assay can be found via the Lab  Guide. This test should be ordered for the detection of SARS-CoV-2 in  individuals who meet SARS-CoV-2 clinical and/or epidemiological  criteria. Test performance is unknown in asymptomatic patients. This  test is for in vitro diagnostic use under the FDA EUA for  laboratories certified under CLIA to perform high complexity testing.  This test has not been FDA cleared or approved. A negative result  does not rule out the presence of PCR inhibitors in the specimen or  target RNA in concentration below the limit of detection for the  assay. The possibility of a false negative should be considered if  the patient's recent exposure or clinical presentation suggests  COVID-19. This test was validated by the [facility]. This laboratory is certified under  the Clinical Laboratory Improvement Amendments of 1988 (CLIA-88) as  qualified to perform high complexity laboratory testing.     INR     Status: Normal   Result Value Ref Range    INR 0.98 0.85 - 1.15   Glucose by meter     Status: Abnormal   Result Value Ref Range    GLUCOSE BY METER POCT 100 (H) 70 - 99 mg/dL   EKG 12-lead, tracing only     Status: None (Preliminary result)   Result Value Ref Range    Systolic Blood Pressure  mmHg     Diastolic Blood Pressure  mmHg    Ventricular Rate 122 BPM    Atrial Rate 122 BPM    RI Interval 172 ms    QRS Duration 60 ms     ms    QTc 413 ms    P Axis 37 degrees    R AXIS 23 degrees    T Axis 86 degrees    Interpretation ECG Sinus tachycardia  Otherwise normal ECG      CBC with platelets differential     Status: Abnormal    Narrative    The following orders were created for panel order CBC with platelets differential.  Procedure                               Abnormality         Status                     ---------                               -----------         ------                     CBC with platelets and d...[[id number]]  Abnormal            Final result                 Please view results for these tests on the individual orders.   [place] Draw     Status: None    Narrative    The following orders were created for panel order [place] Draw.  Procedure                               Abnormality         Status                     ---------                               -----------         ------                     Extra Blood Culture Bottle[[id number]]                       Final result               Extra Blue Top Tube[[id number]]                              Final result               Extra Red Top Tube[[id number]]                               Final result                 Please view results for these tests on the individual orders.   ABO/Rh type and screen *Canceled*     Status: None ()    Narrative    The following orders were created for panel order ABO/Rh type and screen.  Procedure                               Abnormality         Status                     ---------                               -----------         ------                       Please view results for these tests on the individual orders.   ABO/Rh type and screen *Canceled*     Status: None ()    Narrative    The following orders were created for panel order ABO/Rh type and screen.  Procedure                                Abnormality         Status                     ---------                               -----------         ------                     Adult Type and Screen[[id number]]                                                         Please view results for these tests on the individual orders.   ABO/Rh type and screen - Pre-Op     Status: None (In process)    Narrative    The following orders were created for panel order ABO/Rh type and screen - Pre-Op.  Procedure                               Abnormality         Status                     ---------                               -----------         ------                     Adult Type and Screen[[id number]]                            In process                   Please view results for these tests on the individual orders.     Medications   0.9% sodium chloride BOLUS ( Intravenous Auto Hold 2/9/22 2110)     Followed by   sodium chloride 0.9% infusion ( Intravenous Auto Hold 2/9/22 2110)   iopamidol (ISOVUE-370) solution 75 mL ( Intravenous Auto Hold 2/9/22 2110)   sodium chloride (PF) 0.9% PF flush 90 mL ( Intravenous Auto Hold 2/9/22 2110)   LORazepam (ATIVAN) injection 1 mg ( Intravenous Auto Hold 2/9/22 2110)   SUFentanil (SUFENTA) 100 mcg in sodium chloride 0.9 % 50 mL infusion (has no administration in time range)   lactated ringers infusion (has no administration in time range)   fentaNYL (PF) (SUBLIMAZE) injection 25 mcg (has no administration in time range)   HYDROmorphone (DILAUDID) injection 0.2 mg (has no administration in time range)   oxyCODONE (ROXICODONE) tablet 5 mg (has no administration in time range)   ondansetron (ZOFRAN-ODT) ODT tab 4 mg (has no administration in time range)     Or   ondansetron (ZOFRAN) injection 4 mg (has no administration in time range)   prochlorperazine (COMPAZINE) injection 5 mg (has no administration in time range)     Or   prochlorperazine (COMPAZINE) injection 10 mg (has no administration in time range)   labetalol  (NORMODYNE/TRANDATE) injection 10 mg (has no administration in time range)   fentaNYL (PF) (SUBLIMAZE) injection 50 mcg (50 mcg Intravenous Given 2/9/22 2140)   gabapentin (NEURONTIN) capsule 300 mg (300 mg Oral Given 2/9/22 2159)   morphine (PF) injection 2 mg (2 mg Intravenous Given 2/9/22 1605)   0.9% sodium chloride BOLUS (0 mLs Intravenous Stopped 2/9/22 1722)   HYDROmorphone (DILAUDID) injection 0.5 mg (0.5 mg Intravenous Given 2/9/22 1634)   diazepam (VALIUM) injection 5 mg (5 mg Intravenous Given 2/9/22 1632)   fentaNYL (PF) (SUBLIMAZE) injection 50 mcg (50 mcg Intravenous Given 2/9/22 1734)   HYDROmorphone (DILAUDID) injection 1 mg (1 mg Intravenous Given 2/9/22 1830)   LORazepam (ATIVAN) injection 1 mg (1 mg Intravenous Given 2/9/22 1836)   fentaNYL (PF) (SUBLIMAZE) injection 100 mcg (100 mcg Intravenous Given 2/9/22 1957)   fentaNYL (PF) (SUBLIMAZE) injection 50 mcg (50 mcg Intravenous Given 2/9/22 2127)   acetaminophen (TYLENOL) tablet 975 mg (975 mg Oral Given 2/9/22 2159)             The medical record was reviewed and interpreted.  Current labs reviewed and interpreted.  Previous labs reviewed and interpreted.     Results for orders placed or performed during the hospital encounter of 02/09/22 (from the past 24 hour(s))   CBC with platelets differential    Narrative    The following orders were created for panel order CBC with platelets differential.  Procedure                               Abnormality         Status                     ---------                               -----------         ------                     CBC with platelets and d...[[id number]]  Abnormal            Final result                 Please view results for these tests on the individual orders.   Comprehensive metabolic panel   Result Value Ref Range    Sodium 137 133 - 144 mmol/L    Potassium 3.4 3.4 - 5.3 mmol/L    Chloride 102 94 - 109 mmol/L    Carbon Dioxide (CO2) 30 20 - 32 mmol/L    Anion Gap 5 3 - 14 mmol/L    Urea  Nitrogen 12 7 - 30 mg/dL    Creatinine 0.83 0.66 - 1.25 mg/dL    Calcium 9.8 8.5 - 10.1 mg/dL    Glucose 130 (H) 70 - 99 mg/dL    Alkaline Phosphatase 133 40 - 150 U/L    AST 12 0 - 45 U/L    ALT 44 0 - 70 U/L    Protein Total 8.5 6.8 - 8.8 g/dL    Albumin 2.8 (L) 3.4 - 5.0 g/dL    Bilirubin Total 0.3 0.2 - 1.3 mg/dL    GFR Estimate >90 >60 mL/min/1.73m2   [place] Draw    Narrative    The following orders were created for panel order [place] Draw.  Procedure                               Abnormality         Status                     ---------                               -----------         ------                     Extra Blood Culture Bottle[[id number]]                       In process                 Extra Blue Top Tube[[id number]]                              In process                 Extra Red Top Tube[[id number]]                               In process                   Please view results for these tests on the individual orders.   CBC with platelets and differential   Result Value Ref Range    WBC Count 13.9 (H) 4.0 - 11.0 10e3/uL    RBC Count 4.37 (L) 4.40 - 5.90 10e6/uL    Hemoglobin 12.1 (L) 13.3 - 17.7 g/dL    Hematocrit 38.5 (L) 40.0 - 53.0 %    MCV 88 78 - 100 fL    MCH 27.7 26.5 - 33.0 pg    MCHC 31.4 (L) 31.5 - 36.5 g/dL    RDW 12.7 10.0 - 15.0 %    Platelet Count 733 (H) 150 - 450 10e3/uL    % Neutrophils 63 %    % Lymphocytes 27 %    % Monocytes 7 %    % Eosinophils 1 %    % Basophils 1 %    % Immature Granulocytes 1 %    NRBCs per 100 WBC 0 <1 /100    Absolute Neutrophils 8.8 (H) 1.6 - 8.3 10e3/uL    Absolute Lymphocytes 3.8 0.8 - 5.3 10e3/uL    Absolute Monocytes 1.0 0.0 - 1.3 10e3/uL    Absolute Eosinophils 0.2 0.0 - 0.7 10e3/uL    Absolute Basophils 0.1 0.0 - 0.2 10e3/uL    Absolute Immature Granulocytes 0.1 <=0.4 10e3/uL    Absolute NRBCs 0.0 10e3/uL   CRP inflammation   Result Value Ref Range    CRP Inflammation 74.0 (H) 0.0 - 8.0 mg/L     Medications   morphine (PF) injection 2 mg (has no  administration in time range)   0.9% sodium chloride BOLUS (has no administration in time range)          Assessments & Plan (with Medical Decision Making)     Dave Calero is a 51-year-old male who presented to the ER due to concern for a epidural abscess.  Patient has been having worsening back pain for the past 1 month.  Patient initially had a CT done earlier in January that was negative.  Patient was seen again yesterday at [facility] and had an MRI done.  The MRI showed that he had an epidural abscess at C7-T1 and signs of some discitis.  I did speak to the patient's primary orthopedic doctor at [facility] regarding this.  He said that he had spoke to  of orthopedics here, Dr. Calixto, and they transferred the patient here to be admitted.  I spoke to the orthopedic team who reviewed his MRI and agree with admission.  They are wanting the patient to be admitted to their service with plan for operative evaluation and care.  They do not want me to start antibiotics at this time.  The patient is continuing to have ongoing back pain.  He has normal movement in his legs but does have some numbness in his forearms.  This numbness has been present for about 2 weeks.  Patient denies any IV drug use.  Patient has received multiple doses of pain medications but still unable to lay flat.  Patient sitting up in a sitting position.  Patient had some relief with the fentanyl so we will try to give the patient a second dose of fentanyl and see if we can get him more controlled with that medication.  Orthopedics is aware of the patient.  He is planning to go to the CT then up to an orthopedic admission bed.    This part of the medical record was transcribed by Rena Love Medical Scribe, from a dictation done by Alexandra Haynes MD.     Critical care-30 min; acute epidural abscess with need for coordination of care with orthopedics.    I have reviewed the nursing notes.    I have reviewed  the findings, diagnosis, plan and need for follow up with the patient.    New Prescriptions    No medications on file       Final diagnoses:   Epidural abscess   Discitis of cervical region       IDany am serving as a trained medical scribe to document services personally performed by Alexandra Haynes MD, based on the provider's statements to me.      IAlexandra MD, was physically present and have reviewed and verified the accuracy of this note documented by Dany Agrawal.     Alexandra Haynes MD  2/9/2022   [facility] EMERGENCY DEPARTMENT     Alexandra Haynes MD  02/09/22 [id number]

## 2022-02-10 ENCOUNTER — TELEPHONE (OUTPATIENT)
Dept: ORTHOPEDICS | Facility: CLINIC | Age: 52
End: 2022-02-10
Payer: COMMERCIAL

## 2022-02-10 ENCOUNTER — APPOINTMENT (OUTPATIENT)
Dept: MRI IMAGING | Facility: CLINIC | Age: 52
DRG: 453 | End: 2022-02-10
Attending: CLINICAL NURSE SPECIALIST
Payer: COMMERCIAL

## 2022-02-10 ENCOUNTER — APPOINTMENT (OUTPATIENT)
Dept: CT IMAGING | Facility: CLINIC | Age: 52
DRG: 453 | End: 2022-02-10
Attending: CLINICAL NURSE SPECIALIST
Payer: COMMERCIAL

## 2022-02-10 LAB
ALBUMIN UR-MCNC: NEGATIVE MG/DL
ANION GAP SERPL CALCULATED.3IONS-SCNC: 2 MMOL/L (ref 3–14)
APPEARANCE UR: CLEAR
ATRIAL RATE - MUSE: 122 BPM
BILIRUB UR QL STRIP: NEGATIVE
BUN SERPL-MCNC: 9 MG/DL (ref 7–30)
CALCIUM SERPL-MCNC: 8.9 MG/DL (ref 8.5–10.1)
CHLORIDE BLD-SCNC: 108 MMOL/L (ref 94–109)
CO2 SERPL-SCNC: 26 MMOL/L (ref 20–32)
COLOR UR AUTO: ABNORMAL
CREAT SERPL-MCNC: 0.7 MG/DL (ref 0.66–1.25)
DIASTOLIC BLOOD PRESSURE - MUSE: NORMAL MMHG
ENTEROCOCCUS FAECALIS: NOT DETECTED
ENTEROCOCCUS FAECIUM: NOT DETECTED
GFR SERPL CREATININE-BSD FRML MDRD: >90 ML/MIN/1.73M2
GLUCOSE BLD-MCNC: 433 MG/DL (ref 70–99)
GLUCOSE UR STRIP-MCNC: NEGATIVE MG/DL
GRAM STAIN RESULT: ABNORMAL
GRAM STAIN RESULT: ABNORMAL
GRAM STAIN RESULT: NORMAL
HGB BLD-MCNC: 8.3 G/DL (ref 13.3–17.7)
HGB UR QL STRIP: ABNORMAL
HOLD SPECIMEN: NORMAL
HYALINE CASTS: 3 /LPF
INTERPRETATION ECG - MUSE: NORMAL
KETONES UR STRIP-MCNC: NEGATIVE MG/DL
LEUKOCYTE ESTERASE UR QL STRIP: NEGATIVE
LISTERIA SPECIES (DETECTED/NOT DETECTED): NOT DETECTED
MUCOUS THREADS #/AREA URNS LPF: PRESENT /LPF
NITRATE UR QL: NEGATIVE
P AXIS - MUSE: 37 DEGREES
PH UR STRIP: 5.5 [PH] (ref 5–7)
POTASSIUM BLD-SCNC: 4.4 MMOL/L (ref 3.4–5.3)
POTASSIUM BLD-SCNC: 5.9 MMOL/L (ref 3.4–5.3)
PR INTERVAL - MUSE: 172 MS
QRS DURATION - MUSE: 60 MS
QT - MUSE: 290 MS
QTC - MUSE: 413 MS
R AXIS - MUSE: 23 DEGREES
RBC URINE: 1 /HPF
SODIUM SERPL-SCNC: 136 MMOL/L (ref 133–144)
SP GR UR STRIP: 1.02 (ref 1–1.03)
STAPHYLOCOCCUS AUREUS: NOT DETECTED
STAPHYLOCOCCUS EPIDERMIDIS: NOT DETECTED
STAPHYLOCOCCUS LUGDUNENSIS: NOT DETECTED
STAPHYLOCOCCUS SPECIES: NOT DETECTED
STREPTOCOCCUS AGALACTIAE: NOT DETECTED
STREPTOCOCCUS ANGINOSUS GROUP: NOT DETECTED
STREPTOCOCCUS PNEUMONIAE: NOT DETECTED
STREPTOCOCCUS PYOGENES: NOT DETECTED
STREPTOCOCCUS SPECIES: NOT DETECTED
SYSTOLIC BLOOD PRESSURE - MUSE: NORMAL MMHG
T AXIS - MUSE: 86 DEGREES
UROBILINOGEN UR STRIP-MCNC: NORMAL MG/DL
VENTRICULAR RATE- MUSE: 122 BPM
WBC URINE: 1 /HPF

## 2022-02-10 PROCEDURE — 87077 CULTURE AEROBIC IDENTIFY: CPT | Performed by: ORTHOPAEDIC SURGERY

## 2022-02-10 PROCEDURE — 250N000009 HC RX 250: Performed by: ORTHOPAEDIC SURGERY

## 2022-02-10 PROCEDURE — 258N000003 HC RX IP 258 OP 636: Performed by: INTERNAL MEDICINE

## 2022-02-10 PROCEDURE — 255N000002 HC RX 255 OP 636: Performed by: ORTHOPAEDIC SURGERY

## 2022-02-10 PROCEDURE — 72125 CT NECK SPINE W/O DYE: CPT | Mod: 26 | Performed by: RADIOLOGY

## 2022-02-10 PROCEDURE — 87086 URINE CULTURE/COLONY COUNT: CPT | Performed by: STUDENT IN AN ORGANIZED HEALTH CARE EDUCATION/TRAINING PROGRAM

## 2022-02-10 PROCEDURE — 250N000011 HC RX IP 250 OP 636: Performed by: STUDENT IN AN ORGANIZED HEALTH CARE EDUCATION/TRAINING PROGRAM

## 2022-02-10 PROCEDURE — 80048 BASIC METABOLIC PNL TOTAL CA: CPT | Performed by: INTERNAL MEDICINE

## 2022-02-10 PROCEDURE — 250N000013 HC RX MED GY IP 250 OP 250 PS 637: Performed by: STUDENT IN AN ORGANIZED HEALTH CARE EDUCATION/TRAINING PROGRAM

## 2022-02-10 PROCEDURE — 84132 ASSAY OF SERUM POTASSIUM: CPT | Performed by: INTERNAL MEDICINE

## 2022-02-10 PROCEDURE — 72125 CT NECK SPINE W/O DYE: CPT

## 2022-02-10 PROCEDURE — 250N000011 HC RX IP 250 OP 636: Performed by: INTERNAL MEDICINE

## 2022-02-10 PROCEDURE — A9585 GADOBUTROL INJECTION: HCPCS | Performed by: ORTHOPAEDIC SURGERY

## 2022-02-10 PROCEDURE — 36415 COLL VENOUS BLD VENIPUNCTURE: CPT | Performed by: INTERNAL MEDICINE

## 2022-02-10 PROCEDURE — 87075 CULTR BACTERIA EXCEPT BLOOD: CPT | Performed by: ORTHOPAEDIC SURGERY

## 2022-02-10 PROCEDURE — 99223 1ST HOSP IP/OBS HIGH 75: CPT | Performed by: INTERNAL MEDICINE

## 2022-02-10 PROCEDURE — 250N000011 HC RX IP 250 OP 636: Performed by: ANESTHESIOLOGY

## 2022-02-10 PROCEDURE — 87205 SMEAR GRAM STAIN: CPT | Performed by: ORTHOPAEDIC SURGERY

## 2022-02-10 PROCEDURE — L1499 SPINAL ORTHOSIS NOS: HCPCS

## 2022-02-10 PROCEDURE — 81001 URINALYSIS AUTO W/SCOPE: CPT | Performed by: STUDENT IN AN ORGANIZED HEALTH CARE EDUCATION/TRAINING PROGRAM

## 2022-02-10 PROCEDURE — 72156 MRI NECK SPINE W/O & W/DYE: CPT | Mod: 26 | Performed by: RADIOLOGY

## 2022-02-10 PROCEDURE — 250N000011 HC RX IP 250 OP 636: Performed by: ORTHOPAEDIC SURGERY

## 2022-02-10 PROCEDURE — 85018 HEMOGLOBIN: CPT | Performed by: STUDENT IN AN ORGANIZED HEALTH CARE EDUCATION/TRAINING PROGRAM

## 2022-02-10 PROCEDURE — 36415 COLL VENOUS BLD VENIPUNCTURE: CPT | Performed by: STUDENT IN AN ORGANIZED HEALTH CARE EDUCATION/TRAINING PROGRAM

## 2022-02-10 PROCEDURE — 250N000009 HC RX 250: Performed by: STUDENT IN AN ORGANIZED HEALTH CARE EDUCATION/TRAINING PROGRAM

## 2022-02-10 PROCEDURE — 87040 BLOOD CULTURE FOR BACTERIA: CPT | Performed by: INTERNAL MEDICINE

## 2022-02-10 PROCEDURE — 120N000002 HC R&B MED SURG/OB UMMC

## 2022-02-10 PROCEDURE — L0174 CERV SR 2PC THOR EXT PRE OTS: HCPCS

## 2022-02-10 PROCEDURE — 72156 MRI NECK SPINE W/O & W/DYE: CPT

## 2022-02-10 RX ORDER — BACLOFEN 10 MG/1
5-10 TABLET ORAL 3 TIMES DAILY PRN
COMMUNITY
End: 2022-10-11

## 2022-02-10 RX ORDER — NALOXONE HYDROCHLORIDE 0.4 MG/ML
0.2 INJECTION, SOLUTION INTRAMUSCULAR; INTRAVENOUS; SUBCUTANEOUS
Status: DISCONTINUED | OUTPATIENT
Start: 2022-02-10 | End: 2022-02-16 | Stop reason: HOSPADM

## 2022-02-10 RX ORDER — HYDROXYZINE HYDROCHLORIDE 25 MG/1
25-50 TABLET, FILM COATED ORAL 3 TIMES DAILY PRN
Status: ON HOLD | COMMUNITY
End: 2022-02-16

## 2022-02-10 RX ORDER — CEFAZOLIN SODIUM 2 G/100ML
2 INJECTION, SOLUTION INTRAVENOUS EVERY 8 HOURS
Status: DISCONTINUED | OUTPATIENT
Start: 2022-02-10 | End: 2022-02-10

## 2022-02-10 RX ORDER — HYDROMORPHONE HCL IN WATER/PF 6 MG/30 ML
0.5 PATIENT CONTROLLED ANALGESIA SYRINGE INTRAVENOUS
Status: DISCONTINUED | OUTPATIENT
Start: 2022-02-10 | End: 2022-02-10

## 2022-02-10 RX ORDER — ONDANSETRON 4 MG/1
4 TABLET, ORALLY DISINTEGRATING ORAL EVERY 6 HOURS PRN
Status: DISCONTINUED | OUTPATIENT
Start: 2022-02-10 | End: 2022-02-10

## 2022-02-10 RX ORDER — ACETAMINOPHEN 500 MG
500-1000 TABLET ORAL EVERY 6 HOURS PRN
Status: ON HOLD | COMMUNITY
End: 2022-02-16

## 2022-02-10 RX ORDER — ONDANSETRON 4 MG/1
4 TABLET, ORALLY DISINTEGRATING ORAL EVERY 6 HOURS PRN
Status: DISCONTINUED | OUTPATIENT
Start: 2022-02-10 | End: 2022-02-16 | Stop reason: HOSPADM

## 2022-02-10 RX ORDER — PROCHLORPERAZINE MALEATE 10 MG
10 TABLET ORAL EVERY 6 HOURS PRN
Status: DISCONTINUED | OUTPATIENT
Start: 2022-02-10 | End: 2022-02-10

## 2022-02-10 RX ORDER — SODIUM CHLORIDE 9 MG/ML
INJECTION, SOLUTION INTRAVENOUS CONTINUOUS
Status: DISCONTINUED | OUTPATIENT
Start: 2022-02-10 | End: 2022-02-11

## 2022-02-10 RX ORDER — HYDROMORPHONE HCL IN WATER/PF 6 MG/30 ML
0.4 PATIENT CONTROLLED ANALGESIA SYRINGE INTRAVENOUS
Status: DISCONTINUED | OUTPATIENT
Start: 2022-02-10 | End: 2022-02-16 | Stop reason: HOSPADM

## 2022-02-10 RX ORDER — LIDOCAINE 40 MG/G
CREAM TOPICAL
Status: DISCONTINUED | OUTPATIENT
Start: 2022-02-10 | End: 2022-02-14

## 2022-02-10 RX ORDER — GABAPENTIN 300 MG/1
CAPSULE ORAL
COMMUNITY
End: 2022-10-11

## 2022-02-10 RX ORDER — CEFAZOLIN SODIUM 1 G/50ML
1250 SOLUTION INTRAVENOUS EVERY 12 HOURS
Status: DISCONTINUED | OUTPATIENT
Start: 2022-02-10 | End: 2022-02-14

## 2022-02-10 RX ORDER — ONDANSETRON 2 MG/ML
4 INJECTION INTRAMUSCULAR; INTRAVENOUS EVERY 6 HOURS PRN
Status: DISCONTINUED | OUTPATIENT
Start: 2022-02-10 | End: 2022-02-10

## 2022-02-10 RX ORDER — VITAMIN B COMPLEX
50 TABLET ORAL DAILY
Status: DISCONTINUED | OUTPATIENT
Start: 2022-02-10 | End: 2022-02-16 | Stop reason: HOSPADM

## 2022-02-10 RX ORDER — DEXTROSE MONOHYDRATE, SODIUM CHLORIDE, AND POTASSIUM CHLORIDE 50; 1.49; 4.5 G/1000ML; G/1000ML; G/1000ML
INJECTION, SOLUTION INTRAVENOUS CONTINUOUS
Status: DISCONTINUED | OUTPATIENT
Start: 2022-02-10 | End: 2022-02-10

## 2022-02-10 RX ORDER — OXYCODONE HYDROCHLORIDE 10 MG/1
10 TABLET ORAL EVERY 4 HOURS PRN
Status: DISCONTINUED | OUTPATIENT
Start: 2022-02-10 | End: 2022-02-12

## 2022-02-10 RX ORDER — NALOXONE HYDROCHLORIDE 0.4 MG/ML
0.4 INJECTION, SOLUTION INTRAMUSCULAR; INTRAVENOUS; SUBCUTANEOUS
Status: DISCONTINUED | OUTPATIENT
Start: 2022-02-10 | End: 2022-02-16 | Stop reason: HOSPADM

## 2022-02-10 RX ORDER — OXYCODONE HYDROCHLORIDE 5 MG/1
5 TABLET ORAL EVERY 4 HOURS PRN
Status: DISCONTINUED | OUTPATIENT
Start: 2022-02-10 | End: 2022-02-12

## 2022-02-10 RX ORDER — POLYETHYLENE GLYCOL 3350 17 G/17G
17 POWDER, FOR SOLUTION ORAL DAILY
Status: DISCONTINUED | OUTPATIENT
Start: 2022-02-10 | End: 2022-02-16 | Stop reason: HOSPADM

## 2022-02-10 RX ORDER — VANCOMYCIN HYDROCHLORIDE 1 G/200ML
1000 INJECTION, SOLUTION INTRAVENOUS EVERY 12 HOURS
Status: DISCONTINUED | OUTPATIENT
Start: 2022-02-10 | End: 2022-02-10

## 2022-02-10 RX ORDER — HYDROXYZINE HYDROCHLORIDE 25 MG/1
25 TABLET, FILM COATED ORAL EVERY 6 HOURS PRN
Status: DISCONTINUED | OUTPATIENT
Start: 2022-02-10 | End: 2022-02-13

## 2022-02-10 RX ORDER — OXYCODONE HYDROCHLORIDE 5 MG/1
5-10 TABLET ORAL
Status: DISCONTINUED | OUTPATIENT
Start: 2022-02-10 | End: 2022-02-10

## 2022-02-10 RX ORDER — IBUPROFEN 200 MG
200 TABLET ORAL EVERY 4 HOURS PRN
COMMUNITY

## 2022-02-10 RX ORDER — AMOXICILLIN 250 MG
2 CAPSULE ORAL 2 TIMES DAILY
Status: DISCONTINUED | OUTPATIENT
Start: 2022-02-10 | End: 2022-02-10

## 2022-02-10 RX ORDER — CEFAZOLIN SODIUM 1 G/3ML
1 INJECTION, POWDER, FOR SOLUTION INTRAMUSCULAR; INTRAVENOUS EVERY 8 HOURS
Status: DISCONTINUED | OUTPATIENT
Start: 2022-02-10 | End: 2022-02-10

## 2022-02-10 RX ORDER — GADOBUTROL 604.72 MG/ML
7.5 INJECTION INTRAVENOUS ONCE
Status: COMPLETED | OUTPATIENT
Start: 2022-02-10 | End: 2022-02-10

## 2022-02-10 RX ORDER — METHOCARBAMOL 750 MG/1
750 TABLET, FILM COATED ORAL 4 TIMES DAILY PRN
Status: DISCONTINUED | OUTPATIENT
Start: 2022-02-10 | End: 2022-02-16 | Stop reason: HOSPADM

## 2022-02-10 RX ORDER — GABAPENTIN 300 MG/1
300 CAPSULE ORAL 2 TIMES DAILY
Status: DISPENSED | OUTPATIENT
Start: 2022-02-10 | End: 2022-02-13

## 2022-02-10 RX ORDER — FAMOTIDINE 20 MG/1
20 TABLET, FILM COATED ORAL 2 TIMES DAILY
Status: DISCONTINUED | OUTPATIENT
Start: 2022-02-10 | End: 2022-02-16 | Stop reason: HOSPADM

## 2022-02-10 RX ORDER — ONDANSETRON 2 MG/ML
4 INJECTION INTRAMUSCULAR; INTRAVENOUS EVERY 6 HOURS PRN
Status: DISCONTINUED | OUTPATIENT
Start: 2022-02-10 | End: 2022-02-16 | Stop reason: HOSPADM

## 2022-02-10 RX ORDER — ACETAMINOPHEN 325 MG/1
975 TABLET ORAL EVERY 8 HOURS
Status: DISPENSED | OUTPATIENT
Start: 2022-02-10 | End: 2022-02-12

## 2022-02-10 RX ORDER — CEFAZOLIN SODIUM 2 G/100ML
2 INJECTION, SOLUTION INTRAVENOUS EVERY 8 HOURS
Status: DISCONTINUED | OUTPATIENT
Start: 2022-02-10 | End: 2022-02-16 | Stop reason: HOSPADM

## 2022-02-10 RX ORDER — HYDROMORPHONE HCL IN WATER/PF 6 MG/30 ML
0.2 PATIENT CONTROLLED ANALGESIA SYRINGE INTRAVENOUS
Status: DISCONTINUED | OUTPATIENT
Start: 2022-02-10 | End: 2022-02-16 | Stop reason: HOSPADM

## 2022-02-10 RX ORDER — AMOXICILLIN 250 MG
1 CAPSULE ORAL 2 TIMES DAILY
Status: DISCONTINUED | OUTPATIENT
Start: 2022-02-10 | End: 2022-02-16 | Stop reason: HOSPADM

## 2022-02-10 RX ORDER — ACETAMINOPHEN 325 MG/1
650 TABLET ORAL EVERY 4 HOURS PRN
Status: DISCONTINUED | OUTPATIENT
Start: 2022-02-13 | End: 2022-02-10

## 2022-02-10 RX ORDER — LORAZEPAM 0.5 MG/1
0.5 TABLET ORAL EVERY 6 HOURS PRN
Status: DISCONTINUED | OUTPATIENT
Start: 2022-02-10 | End: 2022-02-12

## 2022-02-10 RX ORDER — BUPIVACAINE HYDROCHLORIDE AND EPINEPHRINE 2.5; 5 MG/ML; UG/ML
INJECTION, SOLUTION INFILTRATION; PERINEURAL PRN
Status: DISCONTINUED | OUTPATIENT
Start: 2022-02-10 | End: 2022-02-10 | Stop reason: HOSPADM

## 2022-02-10 RX ORDER — BISACODYL 10 MG
10 SUPPOSITORY, RECTAL RECTAL DAILY PRN
Status: DISCONTINUED | OUTPATIENT
Start: 2022-02-10 | End: 2022-02-16 | Stop reason: HOSPADM

## 2022-02-10 RX ORDER — BISACODYL 10 MG
10 SUPPOSITORY, RECTAL RECTAL DAILY PRN
Status: DISCONTINUED | OUTPATIENT
Start: 2022-02-10 | End: 2022-02-10

## 2022-02-10 RX ORDER — PROCHLORPERAZINE MALEATE 10 MG
10 TABLET ORAL EVERY 6 HOURS PRN
Status: DISCONTINUED | OUTPATIENT
Start: 2022-02-10 | End: 2022-02-16 | Stop reason: HOSPADM

## 2022-02-10 RX ORDER — ACETAMINOPHEN 325 MG/1
650 TABLET ORAL EVERY 4 HOURS PRN
Status: DISCONTINUED | OUTPATIENT
Start: 2022-02-13 | End: 2022-02-12

## 2022-02-10 RX ORDER — LIDOCAINE 40 MG/G
CREAM TOPICAL
Status: DISCONTINUED | OUTPATIENT
Start: 2022-02-10 | End: 2022-02-10

## 2022-02-10 RX ORDER — MAGNESIUM HYDROXIDE/ALUMINUM HYDROXICE/SIMETHICONE 120; 1200; 1200 MG/30ML; MG/30ML; MG/30ML
30 SUSPENSION ORAL EVERY 4 HOURS PRN
Status: DISCONTINUED | OUTPATIENT
Start: 2022-02-10 | End: 2022-02-16 | Stop reason: HOSPADM

## 2022-02-10 RX ORDER — ACETAMINOPHEN 325 MG/1
975 TABLET ORAL EVERY 8 HOURS
Status: DISCONTINUED | OUTPATIENT
Start: 2022-02-10 | End: 2022-02-10

## 2022-02-10 RX ORDER — PIPERACILLIN SODIUM, TAZOBACTAM SODIUM 3; .375 G/15ML; G/15ML
3.38 INJECTION, POWDER, LYOPHILIZED, FOR SOLUTION INTRAVENOUS EVERY 6 HOURS
Status: DISCONTINUED | OUTPATIENT
Start: 2022-02-10 | End: 2022-02-10

## 2022-02-10 RX ORDER — FLUOXETINE 10 MG/1
10 CAPSULE ORAL EVERY MORNING
COMMUNITY

## 2022-02-10 RX ORDER — CETIRIZINE HYDROCHLORIDE 10 MG/1
10 TABLET ORAL EVERY MORNING
COMMUNITY

## 2022-02-10 RX ORDER — HYDROXYZINE HYDROCHLORIDE 25 MG/1
25 TABLET, FILM COATED ORAL EVERY 6 HOURS PRN
Status: DISCONTINUED | OUTPATIENT
Start: 2022-02-10 | End: 2022-02-10

## 2022-02-10 RX ORDER — POLYETHYLENE GLYCOL 3350 17 G/17G
17 POWDER, FOR SOLUTION ORAL DAILY
Status: DISCONTINUED | OUTPATIENT
Start: 2022-02-11 | End: 2022-02-10

## 2022-02-10 RX ORDER — VANCOMYCIN HYDROCHLORIDE 1 G/20ML
INJECTION, POWDER, LYOPHILIZED, FOR SOLUTION INTRAVENOUS PRN
Status: DISCONTINUED | OUTPATIENT
Start: 2022-02-10 | End: 2022-02-10 | Stop reason: HOSPADM

## 2022-02-10 RX ADMIN — ACETAMINOPHEN 975 MG: 325 TABLET, FILM COATED ORAL at 15:10

## 2022-02-10 RX ADMIN — OXYCODONE HYDROCHLORIDE 10 MG: 10 TABLET ORAL at 21:20

## 2022-02-10 RX ADMIN — GADOBUTROL 7.5 ML: 604.72 INJECTION INTRAVENOUS at 14:47

## 2022-02-10 RX ADMIN — ACETAMINOPHEN 975 MG: 325 TABLET, FILM COATED ORAL at 22:31

## 2022-02-10 RX ADMIN — HYDROMORPHONE HYDROCHLORIDE 0.4 MG: 0.2 INJECTION, SOLUTION INTRAMUSCULAR; INTRAVENOUS; SUBCUTANEOUS at 23:57

## 2022-02-10 RX ADMIN — Medication 10 MG: at 00:54

## 2022-02-10 RX ADMIN — VANCOMYCIN HYDROCHLORIDE 1250 MG: 1 INJECTION, POWDER, LYOPHILIZED, FOR SOLUTION INTRAVENOUS at 15:17

## 2022-02-10 RX ADMIN — HYDROMORPHONE HYDROCHLORIDE 0.4 MG: 0.2 INJECTION, SOLUTION INTRAMUSCULAR; INTRAVENOUS; SUBCUTANEOUS at 21:14

## 2022-02-10 RX ADMIN — GABAPENTIN 300 MG: 300 CAPSULE ORAL at 09:06

## 2022-02-10 RX ADMIN — SODIUM CHLORIDE: 9 INJECTION, SOLUTION INTRAVENOUS at 09:24

## 2022-02-10 RX ADMIN — CEFAZOLIN 1 G: 1 INJECTION, POWDER, FOR SOLUTION INTRAMUSCULAR; INTRAVENOUS at 06:07

## 2022-02-10 RX ADMIN — METHOCARBAMOL 750 MG: 750 TABLET ORAL at 16:40

## 2022-02-10 RX ADMIN — MIDAZOLAM 2 MG: 1 INJECTION INTRAMUSCULAR; INTRAVENOUS at 04:03

## 2022-02-10 RX ADMIN — OXYCODONE HYDROCHLORIDE 5 MG: 5 TABLET ORAL at 16:40

## 2022-02-10 RX ADMIN — POLYETHYLENE GLYCOL 3350 17 G: 17 POWDER, FOR SOLUTION ORAL at 09:06

## 2022-02-10 RX ADMIN — METHOCARBAMOL 750 MG: 750 TABLET ORAL at 22:31

## 2022-02-10 RX ADMIN — SENNOSIDES AND DOCUSATE SODIUM 1 TABLET: 50; 8.6 TABLET ORAL at 21:27

## 2022-02-10 RX ADMIN — GABAPENTIN 300 MG: 300 CAPSULE ORAL at 21:26

## 2022-02-10 RX ADMIN — Medication 10 MG: at 01:55

## 2022-02-10 RX ADMIN — FAMOTIDINE 20 MG: 20 TABLET, FILM COATED ORAL at 21:27

## 2022-02-10 RX ADMIN — Medication 2 G: at 16:33

## 2022-02-10 RX ADMIN — HYDROXYZINE HYDROCHLORIDE 25 MG: 25 TABLET ORAL at 21:27

## 2022-02-10 RX ADMIN — SENNOSIDES AND DOCUSATE SODIUM 1 TABLET: 50; 8.6 TABLET ORAL at 09:06

## 2022-02-10 RX ADMIN — FAMOTIDINE 20 MG: 20 TABLET, FILM COATED ORAL at 09:05

## 2022-02-10 RX ADMIN — OXYCODONE HYDROCHLORIDE 5 MG: 5 TABLET ORAL at 15:19

## 2022-02-10 RX ADMIN — Medication 50 MCG: at 09:06

## 2022-02-10 ASSESSMENT — ACTIVITIES OF DAILY LIVING (ADL)
ADLS_ACUITY_SCORE: 4
ADLS_ACUITY_SCORE: 4
ADLS_ACUITY_SCORE: 7
ADLS_ACUITY_SCORE: 4
ADLS_ACUITY_SCORE: 7
ADLS_ACUITY_SCORE: 7
ADLS_ACUITY_SCORE: 4
ADLS_ACUITY_SCORE: 4
ADLS_ACUITY_SCORE: 7
ADLS_ACUITY_SCORE: 7
ADLS_ACUITY_SCORE: 4
ADLS_ACUITY_SCORE: 4
ADLS_ACUITY_SCORE: 7
ADLS_ACUITY_SCORE: 4
ADLS_ACUITY_SCORE: 7
DEPENDENT_IADLS:: INDEPENDENT
ADLS_ACUITY_SCORE: 7

## 2022-02-10 NOTE — H&P
[facility]  ORTHOPAEDIC SURGERY CONSULT - HISTORY AND PHYSICAL    DATE OF CONSULT: 2/9/2022 7:25 PM    REQUESTING PROVIDER: Alexandra Haynes MD - [facility] ED Staff.    CC: neck pain    DATE OF INJURY: pain began 1/3/22    HISTORY OF PRESENT ILLNESS:   Dave Calero is a 51 year old otherwise healthy male (PMH: HLD, ADHD, Depression) who reports pain in the base of his neck and between his shoulder blades beginning on 1/3/22. He was subsequently worked up with CTs of his thoracic and lumbar spine without concern at that time.  However, since that time he has been unable to return to work due to progressive pain.  Pain is present all the time and significantly affects his sleep.  Patient reports his pain is improved in upright or sitting posture but significantly exacerbates when reclining or attempting to lay supine.  Patient was prescribed both Flexeril and Percocet which have not been effective in controlling his pain.  Additionally he endorses a sensation of a pill stuck in his throat and reports altered sensation to the ulnar aspects of his forearms bilaterally.  He denies any weakness or gait changes, but does note it has been harder for him to use his hands to open a water bottle which was a new change.  He denies dropping objects, difficulty writing or other changes in fine motor tasks.  He denies any recent illnesses or infections.  He denies any fevers or chills.  He denies any prior histories of spine infection or surgery.  He denies any history of drug use or diabetes.    Patient was subsequently seen at [facility] yesterday or MRI was obtained and results returned today concerning for cervical discitis/epidural abscess.  Due to this he was referred to the [facility] emergency department for further evaluation and treatment.    His last NPO was 1 PM (hamburger).  Presently he denies any numbness or tingling in his perineum, bowel or bladder changes.  He denies  any change in sensation in his lower extremities.  Denies chest pain, shortness of breath.    PAST MEDICAL HISTORY:   ADHD, depression, hyperlipidemia  Patient denies any personal history of bleeding disorders, clotting disorders, or adverse reactions to anesthesia.    PAST SURGICAL HISTORY:   Knee arthroscopic surgery r  Right wrist surgery      MEDICATIONS:   Prior to Admission medications    Not on File   Adderall, fluoxetine    ALLERGIES:   Patient has no known allergies.    SOCIAL HISTORY:   Social History     Socioeconomic History     Marital status:      Spouse name: Not on file     Number of children: Not on file     Years of education: Not on file     Highest education level: Not on file   Occupational History     Not on file   Tobacco Use     Smoking status: Not on file     Smokeless tobacco: Not on file   Substance and Sexual Activity     Alcohol use: Not on file     Drug use: Not on file     Sexual activity: Not on file   Other Topics Concern     Not on file   Social History Narrative     Not on file     Social Determinants of Health     Financial Resource Strain: Not on file   Food Insecurity: Not on file   Transportation Needs: Not on file   Physical Activity: Not on file   Stress: Not on file   Social Connections: Not on file   Intimate Partner Violence: Not on file   Housing Stability: Not on file     Living situation: Patient lives in a house/rambler in [place] with his wife Angeline.  Occupation: Property management  Tobacco: None  Alcohol: Occasional  Illicit Drugs: Denies, including no history of IV drug use    FAMILY HISTORY:  No family history on file.    Patient denies known family history of bleeding, clotting, or anesthesia related complications.     REVIEW OF SYSTEMS:   Please see HPI for review of systems. Otherwise a 10-point reviews of systems was negative except as noted above in the HPI.     PHYSICAL EXAM:   Vitals:    02/09/22 1525 02/09/22 1526 02/09/22 1728 02/09/22 1729    BP: (!) 140/96  (!) 141/88    Pulse: 120  117    Resp:       Temp:       TempSrc:       SpO2:  100%  98%     General: Awake, alert, appropriate, following commands, in significant pain that is uncontrolled.  Skin: No rashes,  skin color normal.  HEENT: Normal.   Lungs: Breathing comfortably and nonlabored, no wheezes or stridor noted.  On room air.  Lungs clear to auscultation bilaterally.  Heart/Cardiovascular: Regular pulse, no peripheral cyanosis.  No murmurs on cardiac auscultation.  Abdomen: Soft, non-tender, non-distended.   Back: Nontender to palpation throughout, no step-offs or deformities appreciated. Bilateral SI joints non-tender.   Cervical spine:    Appearance -no gross step-offs, kyphosis. No prior surgical incisions or skin changes.    TTP inferior cervical spine and paraspinal tissues.    Motor -     C5: Deltoids R 5/5 and L 5/5 strength    C6: Biceps R 5/5 and L 5/5 strength     C7: Triceps R 5/5 and L 5/5 strength     C8:  R 5/5 and L 5/5 strength     T1: Dorsal interossei R 4+/5 and L 4+/5 strength        Sensation: intact to light touch in C5-T1, however diminished bilaterally in the C8 and T1 distributions      Special Tests -      Rhomberg Test - Negative     Spurling's Test - Negative      José's Test - Negative       Lumbar Spine:    Appearance - No gross stepoffs or deformities    No TTP in lumbar spine.    Motor -     L2-3: Hip flexion R 5/5  And L 5/5 strength          L3/4:  Knee extension R 5/5 and L 5/5 strength         L4/5:  Foot dorsiflexion R 5/5 L 5/5 and       EHL dorsiflexion R 5/5 L 5/5 strength         S1:  Plantarflexion/Peroneal Muscles  R 5/5 and L 5/5 strength    Sensation: intact to light touch L3-S1 distribution BLE        Neurologic:      REFLEXES Right Left   Biceps 2+ 2+   Brachioradialis 2+ 2+   Patella 2+ 2+   Ankle jerk 2+ 2+   Babinski No upgoing great toe No upgoing great toe   Clonus 1 beat 1 beat     Hip Exam:  No pain with hip log roll and no  tenderness over the greater trochanters.    Alignment:  Patient stands with a neutral standing sagittal balance.    Gait:  Patient able to walk in balanced tandem gait.      LABS:  Hemoglobin   Date Value Ref Range Status   2022 12.1 (L) 13.3 - 17.7 g/dL Final     WBC Count   Date Value Ref Range Status   2022 13.9 (H) 4.0 - 11.0 10e3/uL Final     Platelet Count   Date Value Ref Range Status   2022 733 (H) 150 - 450 10e3/uL Final     INR   Date Value Ref Range Status   2022 0.98 0.85 - 1.15 Final     Creatinine   Date Value Ref Range Status   2022 0.83 0.66 - 1.25 mg/dL Final     Glucose   Date Value Ref Range Status   2022 130 (H) 70 - 99 mg/dL Final     CRP: 74  Blood cultures: pending    IMAGING:  MRI of the cervical spine from outside hospital 2022 was independently reviewed and demonstrates discitis from C5-T1 and associated epidural abscess.    IMPRESSION:   Dave Calero is a 51 year old otherwise healthy male with 1 month of progressive neck and back pain with the followin. Acute cervical discitis and epidural abscess (C5-T1)    RECOMMENDATIONS:   - Admit to Orthopedics.  - Plan for OR: 22 with Dr. Calixto, anterior cervical diskectomy and fusion with possible iliac crest bone autograft   -Consent: completed (paper copy in chart)   -Pre-op labs: complete (COVID Negative)   -Medicine clearance: not indicated preoperatively, patient 51 yrs old and healthy with normal heart and lung exam. Will obtain postop to help rule-out other sources of infection.  - Anticoagulation/DVT Prophylaxis: chemical anticoagulation not indicated and should be held, mechanical ppx  - Cultures: follow blood cultures and intraop culture results  - Antibiotics/Tetanus: hold for OR for operative culture yield. ID consult postop.  - X-rays/Imaging: CT cervical spine pending (may obtain intraop if patient unable to lay supine awake)  - Activity: up with assist  - Brace: Central J  cervical collar (ordered)  - Weight bearing: WBAT  - Pain control: IV with PO prn  - Diet: NPO for OR  - Follow-up: TBD  - Disposition: ED to floor    Assessment and Plan discussed with Dr. Calixto, Orthopaedic Surgery attending staff.     Lam Chinchilla MD 2/9/2022 7:25 PM  Orthopaedic Surgery Resident, PGY-4  Pager: [phone]

## 2022-02-10 NOTE — PROGRESS NOTES
"CLINICAL NUTRITION SERVICES - ASSESSMENT NOTE     Nutrition Prescription    RECOMMENDATIONS FOR MDs/PROVIDERS TO ORDER:  None today    Malnutrition Status:    Unable to determine     Recommendations already ordered by Registered Dietitian (RD):  PRN supplements     Future/Additional Recommendations:  Obtain nutrition/wt history as able  Nutrition education  Scheduled supplements pending intakes      REASON FOR ASSESSMENT  Dave Calero is a 51 year old male assessed by the dietitian for Provider Order - Dietitian to Assess and Order per Nutrition Protocol.  PMH significant for HLD, ADHD and depression admitted for Anterior cervical diskectomy and fusion, cervical 5 to thoracic 1 (3 levels); right or anterior iliac crest autograft harvest  NUTRITION HISTORY  Attempted ot visit pt however he was out of the room. Information obtained from chart review. Unknown intakes PTA and there is limited wt history available from chart review.   CURRENT NUTRITION ORDERS  Diet: Regular  Intake/Tolerance: No documented intakes so far. Lunch tray in room, ate ~50% of a quesadilla.     LABS  Labs reviewed:  K 5.9 (H) improved to 4.4 (WNL)     MEDICATIONS  Medications reviewed:  pepcid  miralax  Senokot  Vit D3  0.9 NaCl @ 75 ml/hr  PRN: maalox, dulcolax, MOM, zofran, compazine     ANTHROPOMETRICS  Height: 175.3 cm (5' 9\")  Most Recent Weight: 78.9 kg (174 lb)    IBW: 72.7 kg  BMI: 25.7 kg/m^2, Overweight BMI 25-29.9  Weight History: 10# (5.4%) wt loss in 1 month   Wt Readings from Last 10 Encounters:   02/09/22 78.9 kg (174 lb)   01/18/22 83.6 kg (184 lb 3.2 oz)     Dosing Weight: 78.9 kg (current)    ASSESSED NUTRITION NEEDS  Estimated Energy Needs: 4139-8779 kcals/day (25 - 30 kcals/kg)  Justification: Maintenance  Estimated Protein Needs:  grams protein/day (1.2 - 1.5 grams of pro/kg)  Justification: Post-op  Estimated Fluid Needs: 1 mL/kcal  Justification: Maintenance or Per provider pending fluid " status    PHYSICAL FINDINGS  See malnutrition section below.    MALNUTRITION  % Intake: Unable to assess  % Weight Loss: Unable to assess  Subcutaneous Fat Loss: Unable to assess  Muscle Loss: Unable to assess  Fluid Accumulation/Edema: None noted  Malnutrition Diagnosis: Unable to determine due to missing information     NUTRITION DIAGNOSIS  Predicted inadequate protein-energy intake related to variable appetite post op as evidenced by pt reliant on PO intakes to meet 100% of nutritional needs with potential for variation          INTERVENTIONS  Implementation  Nutrition Education: Unable to complete, pt unavailable   Supplements PRN     Goals  Patient to consume % of nutritionally adequate meal trays TID, or the equivalent with supplements/snacks.     Monitoring/Evaluation  Progress toward goals will be monitored and evaluated per protocol.    Rena Baldwin MS, RD, LDN  Unit Pager [phone]

## 2022-02-10 NOTE — PROGRESS NOTES
100 of Fentanyl given, patient is still unable to tolerate any lowering of his HOB. ED MD notified.

## 2022-02-10 NOTE — ANESTHESIA PROCEDURE NOTES
Airway         Procedure Start/Stop Times: 2/9/2022 10:21 PM  Staff -        Anesthesiologist:  Mannie Jo MD       Resident/Fellow: Song Cazares MD       Performed By: resident  Consent for Airway        Urgency: emergent       Consent: The procedure was performed in an emergent situation.  Indications and Patient Condition       Indications for airway management: airway protection       Induction type:intravenous       Mask difficulty assessment: 1 - vent by mask    Final Airway Details       Final airway type: endotracheal airway       Successful airway: ETT - single  Endotracheal Airway Details        ETT size (mm): 7.5       Successful intubation technique: video laryngoscopy       VL Blade Size: Glidescope 4       Grade View of Cords: 1       Adjucts: stylet       Position: Center       Measured from: gums/teeth       Secured at (cm): 24       Bite block used: Soft    Post intubation assessment        Placement verified by: capnometry, equal breath sounds and chest rise        Number of attempts at approach: 1       Number of other approaches attempted: 0       Secured with: commercial tube khan       Ease of procedure: easy       Dentition: Intact and Unchanged    Additional Comments       Easy intubation with glidescope. glidescope used due to epidural abscess

## 2022-02-10 NOTE — PROGRESS NOTES
S: Patient was seen today at   for an eval for a cervical collar as ordered by     O/G: The objective/goal of the collar is to help reduce motion/give cervical stability.    A: Pt was fit with a [place] J cervical collar, size 300.  Starting with the anterior section, the correct size and height was chosen that supported the patient in the desired position.  Attention was given to the chin support being sure that the correct height was selected to support the chin.   The posterior section was centered on the patients head .  With the side closure straps extending equally on both sides, the Velcro was secured.  An extra padding set was also provided.  Patient instructed on donning, doffing, use and care    P: Patient has been instructed to contact our facility with any questions and/or concerns.   MELISSA Alvarado.

## 2022-02-10 NOTE — TELEPHONE ENCOUNTER
Patient is scheduled for surgery with Dr. Calixto    Spoke with: Dr. Calixto    Date of Surgery: 2/12/2022    Location: [place]    Informed patient they will need an adult  yes    Pre op with Provider n/a    H&P: inpatient    Pre-procedure COVID-19 Test: inpatient    Additional imaging/appointments: n/a    Surgery packet: n/a     Additional comments: n/a

## 2022-02-10 NOTE — ANESTHESIA PROCEDURE NOTES
Arterial Line Procedure Note    Pre-Procedure   Staff -        Anesthesiologist:  Mannie Jo MD       Resident/Fellow: Song Cazares MD       Performed By: with residents       Procedure performed by resident/fellow/CRNA in presence of a teaching physician.         Location: OR       Pre-Anesthestic Checklist: patient identified, IV checked, risks and benefits discussed, informed consent, monitors and equipment checked, pre-op evaluation and at physician/surgeon's request  Timeout:       Correct Patient: Yes        Correct Procedure: Yes        Correct Site: Yes        Correct Position: Yes   Procedure   Procedure: arterial line       Laterality: left       Insertion Site: radial.  Sterile Prep        Standard elements of sterile barrier followed       Skin prep: Chloraprep  Insertion/Injection        Catheter Type/Size: 20 G, 1.75 in/4.5 cm quick cath (integral wire)  Narrative        Tegaderm dressing used.       Complications: None apparent,        Arterial waveform: Yes        IBP within 10% of NIBP: Yes

## 2022-02-10 NOTE — CONSULTS
General Infectious Disease Service Consultation - [facility] - [place], [place]    Patient:  Dave Calero, Date of birth 1970, Medical record number [id number]  Date of Admission: 2/9/2022  Date of Visit:  2/10/2022  Requesting Provider: Kulwinder Calixto         Assessment and Recommendations:   Problem List:    # Cervical discitis with epidural abscess surgical decompression/cervical diskectomy and fusion from C5-T1 + right anterior iliac crest autograft harvest on 2/9/22  - Gram stain from surgical specimen with Gram positive cocci    Discussion:    Mr. Dave Calero is a 51 year old male with PMHx of HLD, ADHD, depression who was admitted on 2/9/22 with cervical discitis. Patient developed pain in the base of his neck and between his shoulder blades beginning on 1/3/22.  CTs of his thoracic and lumbar spine without concern at that time. However,  The pain persistent and he developed  sensation of a pill stuck in his throat and reports altered sensation to the ulnar aspects of his forearms bilaterally. Patient was subsequently seen at [place] [facility] on 2/8/22.  MRI was obtained and results returned on 2/9/22 concerning for cervical discitis/epidural abscess (MRI not available in the system). Due to this he was referred to the [facility] emergency department on 2/9/22 for further evaluation and treatment. Patient anterior cervical diskectomy and fusion from C5-T1 as well as right anterior iliac crest autograft harvest (Dr. Calixto). There is a plan to return to the OR in the coming days for posterior instrumented fusion. Gram stain from surgical specimen has  Gram positive cocci. Patient is afebrile. White count is 13.9 and CRP is 74. Pro-calcitonin is 0.06. Lactic acid is normal. Blood cultures were obtained on 2/9 (before antibiotics were started) and are negative to date. He is on IV vancomycin and cefazolin.     Patient denies any recent infection. He denies trauma or skin  abnormality on his neck. He denies IV drug use. He denies any prior histories of spine infection or surgery. No immunosuppressive condition. No prosthetic material in his heart valves or cardiovascular devices.          Recommendations:    1. Given presence of Gram positive cocci on Gram stain from surgical specimen, I recommend to continue cefazolin (increased the dose to 2 grams IV q 8h) and IV vancomycin (pharmacy to dose)    2. Antibiotics will be adjusted once culture results are back. I anticipate at least 8 weeks of antibiotics followed by chronic suppression    3. If growth of Staphylococcus in cultures, then rifampin or rifabutin can be considered as adjuvant therapy for biofilm action    4. Appreciate continued ortho follow up      Recommendations discussed with medicine      Thank you for this consult. The General ID team will continue to follow this patient. Please feel free to call with any questions.     Ashley Goel MD  Date of Service: 02/10/22  Pager: [phone]    ADDENDUM (1:58 PM):    Blood culture from  2/9/22 now positive for Gram positive cocci (so far in 1 bottle). I am ordering new blood cultures. Antibiotic recommendation remains the same as above.        History of Present Illness:   Mr. Dave Calero is a 51 year old male with PMHx of HLD, ADHD, depression who was admitted on 2/9/22 with cervical discitis.     Patient reports pain in the base of his neck and between his shoulder blades beginning on 1/3/22. He was subsequently worked up with CTs of his thoracic and lumbar spine without concern at that time. However, since that time he has been unable to return to work due to progressive pain.  Pain is present all the time and significantly affects his sleep.  Patient reports his pain is improved in upright or sitting posture but significantly exacerbates when reclining or attempting to lay supine.  Patient was prescribed both Flexeril and Percocet which have not been effective  in controlling his pain.  Additionally he endorses a sensation of a pill stuck in his throat and reports altered sensation to the ulnar aspects of his forearms bilaterally.  He denies any weakness or gait changes, but does note it has been harder for him to use his hands to open a water bottle which was a new change.  He denies dropping objects, difficulty writing or other changes in fine motor tasks.  He denies any recent illnesses or infections.  He denies any fevers or chills.  He denies any prior histories of spine infection or surgery.  He denies any history of drug use or diabetes. Patient was subsequently seen at [place] [facility] on 2/8/22.  MRI was obtained and results returned on 2/9/22 concerning for cervical discitis/epidural abscess (MRI not available in the system).  Due to this he was referred to the [facility] emergency department on 2/9/22 for further evaluation and treatment.    Patient anterior cervical diskectomy and fusion from C5-T1 as well as right anterior iliac crest autograft harvest (Dr. Calixto). There is a plan to return to the OR in the coming days for posterior instrumented fusion. Gram stain with Gram positive cocci. Patient is afebrile. White count is 13.9 and CRP is 74. Pro-calcitonin is 0.06. Lactic acid is normal. Blood cultures were obtained on 2/9 (before antibiotics were started) and are negative to date.               Review of Systems:     CONSTITUTIONAL:  No fevers or chills  INTEGUMENTARY/SKIN: NEGATIVE for worrisome rashes, moles or lesions  EYES: Negative for icterus, vision changes or irritation  ENT/MOUTH:  Negative for oral lesions and sore throat  RESPIRATORY:  Negative for cough and dyspnea  CARDIOVASCULAR:  Negative for chest pain, palpitations and  shortness of breath  GASTROINTESTINAL:  Negative for abdominal pain, nausea, vomiting, diarrhea and constipation  GENITOURINARY:  Negative for dysuria, hematuria, frequency and urgency  MUSCULOSKELETAL:  "See HPI  NEURO:  See HPI  PSYCHIATRIC: Negative for changes in mood or affect  HEMATOLOGIC/LYMPHATIC: negative for lymphadenopathy or bleeding  ALLERGIC/IMMUNOLOGIC: Negative for allergic reaction   ENDOCRINE: Negative for temperature intolerance, skin/hair changes       Past Medical History:   HLD, ADHD, depression      Allergies:    No Known Allergies       Family History:   Reviewed and noncontributory.        Social History:     Social History     Socioeconomic History     Marital status:      Spouse name: Not on file     Number of children: Not on file     Years of education: Not on file     Highest education level: Not on file   Occupational History     Not on file   Tobacco Use     Smoking status: Former Smoker     Smokeless tobacco: Never Used   Substance and Sexual Activity     Alcohol use: Not on file     Drug use: Not on file     Sexual activity: Not on file   Other Topics Concern     Parent/sibling w/ CABG, MI or angioplasty before 65F 55M? Not Asked   Social History Narrative     Not on file     Social Determinants of Health     Financial Resource Strain: Not on file   Food Insecurity: Not on file   Transportation Needs: Not on file   Physical Activity: Not on file   Stress: Not on file   Social Connections: Not on file   Intimate Partner Violence: Not on file   Housing Stability: Not on file              Physical Exam:   /68 (BP Location: Left arm)   Pulse 109   Temp 98.3  F (36.8  C) (Oral)   Resp 18   Ht 1.753 m (5' 9\")   Wt 78.9 kg (174 lb)   SpO2 96%   BMI 25.70 kg/m         Exam:  GENERAL:  Somnolent, but easily arousable and answering to questions (had surgery overnight, so still under the effect of sedation). Not in acute distress.   HEAD: Normocephalic and atraumatic  ENT:  No hearing impairment, oral mucous membranes moist  EYES:  Eyes grossly normal to inspection, PERRL and conjunctivae and sclerae normal   NECK:  Neck collar in place  LUNGS:  Clear to auscultation - no " rales, rhonchi or wheezes  CARDIOVASCULAR:  Regular rate and rhythm, normal S1 S2, no murmur  ABDOMEN:  Soft, nontender, no hepatosplenomegaly, no masses and bowel sounds normal  EXT: Extremities warm and without edema.  MS: No gross musculoskeletal defects noted, no edema  SKIN:  Surgical site under cervical colar  NEUROLOGIC:  Grossly nonfocal. Normal strength and tone, mentation intact and speech normal  PSYCHIATRIC: Mood stable, mentation appears normal, affect normal           Laboratory Data:     Creatinine   Date Value Ref Range Status   02/10/2022 0.70 0.66 - 1.25 mg/dL Final   02/09/2022 0.83 0.66 - 1.25 mg/dL Final     WBC Count   Date Value Ref Range Status   02/09/2022 13.9 (H) 4.0 - 11.0 10e3/uL Final     Hemoglobin   Date Value Ref Range Status   02/10/2022 8.3 (L) 13.3 - 17.7 g/dL Final     Platelet Count   Date Value Ref Range Status   02/09/2022 733 (H) 150 - 450 10e3/uL Final     Lab Results   Component Value Date     02/10/2022    BUN 9 02/10/2022    CO2 26 02/10/2022     CRP Inflammation   Date Value Ref Range Status   02/09/2022 74.0 (H) 0.0 - 8.0 mg/L Final           Pertinent Recent Microbiology Data:   No results for input(s): CULT, SDES in the last 168 hours.         Imaging:     Recent Results (from the past 48 hour(s))   XR Surgery DEVORA Fluoro L/T 5 Min w Stills    Narrative    Exam: XR SURGERY DEVORA FLUORO LESS THAN 5 MIN W STILLS, 2/9/2022 11:33  PM    Provided History: Anterior cervical discectomy and fusion, possible  anterior plate fixation C5-T1, right or left anterior iliac crest  autograft harvest  ICD-10:    Comparison: None.    Technique: Intraoperative imaging.    Findings:    A single lateral intraoperative image from 11:13 PM 2/9/2022 shows  linear surgical probe tip projecting over the anterior aspect of the  inferior endplate of C6.    Additional surgical clamp projects over the C6-7 intervertebral disc  space. Endotracheal tube is partially imaged.       Impression     Impression: Instrumentation projecting toward anterior aspect of the  inferior endplate of C6.    The above indicated surgical level was reported to operating room  personnel, specifically Dr Calixto, via telephone by Dr Andujar  on 2/9/2022 11:33 PM.    I have personally reviewed the examination and initial interpretation  and I agree with the findings.    LAUREN MEAD MD         SYSTEM ID:  [id number]

## 2022-02-10 NOTE — CONSULTS
Consult Date: 02/10/2022    INTERNAL MEDICINE CONSULTATION    ASSESSMENT:  1.  Cervical diskitis with spondylosis and myelopathy, patient is now status post anterior cervical diskectomy and fusion C5 through T1.  Intraoperative cultures are pending.  Gram stain revealed gram-positive cocci.  The patient is currently receiving Ancef and vancomycin.  Infectious Disease is following.  The etiology of this infection is not clear, as the patient is in overall good physical health.  He may have suffered minor trauma to the neck several weeks ago.  He has not had signs or symptoms of systemic illness.  2.  Anemia.  Hemoglobin 8.3 postop.  Likely secondary to blood loss and acute infection.  3.  Hyperkalemia, likely factitious, with repeat potassium ordered.  4.  History of ADHD, on Adderall.  5.  No known chronic medical concerns.  The patient has never had a formal cardiac or pulmonary evaluation.  The patient does state he has been told his wife that he snores loudly, but he has never had any formal sleep evaluation.    RECOMMENDATIONS:  Potassium is being repeated.  Hemoglobin will be repeated in the morning.  Infectious Disease consultation has been requested.  Swallowing function and respiratory status will be monitored, as the patient may be at increased risk of postoperative respiratory suppression.  DVT prophylaxis is mechanical.  Internal Medicine will follow this patient for concurrent medical issues.    DISCUSSION:  Dave Calero is a healthy 51-year-old male who is currently hospitalized under the care of Dr. Calixto for the management of cervical diskitis.  The patient underwent the above-mentioned procedure urgently early this morning.  He had presented to an outside orthopedist with progressive upper back pain.  The patient reported that he thought he had injured his neck while twisting it in a game of poker in early January.  He did have an MRI in the outpatient setting, which was concerning for  infection.  The patient has not had fevers, chills, cough, recent dental issues.  There is no history of IV drug use.  He denies a history of cardiac or pulmonary disease.  He has never had a formal coronary artery evaluation.    LABORATORY DATA:  Prior to surgery, remarkable for an albumin of 2.8.  Liver function tests were normal.  CRP was 74.  Procalcitonin level 0.06.  White count 13,900, hemoglobin 12.1, platelet count was 733,000.  Intraoperative cultures are pending, with Gram stain as noted above.  Hemoglobin this morning was 8.3.    The events of surgery are noted.  The patient has been hemodynamically stable perioperatively.  Immediately postop, he was agitated and confused, but this has improved.    PHYSICAL EXAMINATION:  GENERAL:  At the time that I am seeing him at 8:30 a.m., he is sleepy, but easily alerts and is fully oriented.  He is wearing a soft neck brace.  HEENT:  Oral exam reveals excellent dentition.  There is no stridor.  LUNGS:  Clear.  HEART:  Reveals a regular rate and rhythm.  There are no murmurs.  ABDOMEN:  Soft, nontender.  EXTREMITIES:  There is no extremity edema.  There are no open areas on his skin.    PAST MEDICAL HISTORY:  Reviewed and is remarkable only for ADHD.  He has never had a formal coronary artery evaluation.  He denies a history of DVT or PE.  He has never been assessed for sleep apnea, but he states that his wife has noted he snores loudly.    PAST SURGICAL HISTORY:  Include minor orthopedic procedures.    MEDICATION ALLERGIES:  NONE.    PREOPERATIVE MEDICATIONS:  Only Adderall 30 mg daily.    SOCIAL HISTORY:  The patient is .  He denies cigarette or significant alcohol use.  He works in property management.    FAMILY HISTORY:  Reviewed by me and is noncontributory.    Preoperative EKG revealed sinus tachycardia, otherwise was normal.    Bob Combs MD        D: 02/10/2022   T: 02/10/2022   MT: [id number]    Name:     SONYA ARAUJO  MRN:       [id number]        Account:      [id number]   :      1970           Consult Date: 02/10/2022     Document: [id number]

## 2022-02-10 NOTE — BRIEF OP NOTE
[facility]    Brief Operative Note    Pre-operative diagnosis: Cervical discitis [M46.42]  Cervical spondylosis with myelopathy [M47.12]  Post-operative diagnosis Same as pre-operative diagnosis    Procedure: Procedure(s):  Anterior cervical diskectomy and fusion, cervical 5 to thoracic 1 (3 levels); right or anterior iliac crest autograft harvest.   Surgeon: Surgeon(s) and Role:     * Kulwinder Calixto MD - Primary     * Stanley Stanley MD - Resident - Assisting  Anesthesia: General   Estimated Blood Loss: 50mL     Drains: x2 anterior cervical spine  Specimens:   ID Type Source Tests Collected by Time Destination   A : pre vertebral fluid Wound Other ANAEROBIC BACTERIAL CULTURE ROUTINE, GRAM STAIN, AEROBIC BACTERIAL CULTURE ROUTINE Kulwinder Calixto MD 2/9/2022 11:25 PM    B : Pre vertebral tissue Tissue Other ANAEROBIC BACTERIAL CULTURE ROUTINE, GRAM STAIN, AEROBIC BACTERIAL CULTURE ROUTINE Kulwinder Calixto MD 2/9/2022 11:35 PM    C : T7 Disc Tissue Other ANAEROBIC BACTERIAL CULTURE ROUTINE, GRAM STAIN, AEROBIC BACTERIAL CULTURE ROUTINE Kulwinder Calixto MD 2/9/2022 11:55 PM    D : C7 disc Tissue Other ANAEROBIC BACTERIAL CULTURE ROUTINE, GRAM STAIN, AEROBIC BACTERIAL CULTURE ROUTINE Kulwinder Calixto MD 2/10/2022 12:03 AM      Findings:   Significant scar, abnormal reactive tissue..  Complications: Unintended instrumentation of T1-T2.   Implants:   Implant Name Type Inv. Item Serial No.  Lot No. LRB No. Used Action   Spacer 6mm x 18mm x 16mm    [employer] [id number] N/A 2 Implanted   End Cap 6mm x 18mm 16mm     [employer] [id number] N/A 1 Implanted       Assessment: Dave Calero is a 51 year old male s/p ACDF C5-6,C7-T1, T1-T2 on 2/9 with Dr. Calixto for epidural abscess and discitis    PLAN for RTOR in coming days for posterior instrumented fusion    Plan:  Orthopedics Primary  Activity: Up with assist  until independent. No excessive bending or twisting. No lifting >10 lbs x 6 weeks. No Scar lift for transfers.   Weight bearing status: WBAT.  Pain management: Transition from IV to PO as tolerated.    Antibiotics: Ancef until antibiotic plan developed  Diet: Begin with clear fluids and progress diet as tolerated.   DVT prophylaxis: SCDs only. No chemical DVT ppx needed at discharge.  Labs: Hgb POD#1.  Bracing/Splinting: [place] J -ordered.  Dressings: Keep dressing CDI.  Drains: Document output per shift, will be discontinued at Orthopedic Surgery discretion.  Jerry catheter: can be removed POD1-2 pending plan for RTOR.   Physical Therapy/Occupational Therapy: Eval and treat.  Cultures: Pending, follow culture results closely.    Consults: IM,ID, PT, OT, orthotics.  Follow-up: TBD  Disposition: admitted to orthopedics

## 2022-02-10 NOTE — PHARMACY-VANCOMYCIN DOSING SERVICE
Pharmacy Vancomycin Initial Note  Date of Service February 10, 2022  Patient's  1970  51 year old, male    Indication: Bone and Joint Infection    Current estimated CrCl = Estimated Creatinine Clearance: 140.9 mL/min (based on SCr of 0.7 mg/dL).    Creatinine for last 3 days  2022:  2:38 PM Creatinine 0.83 mg/dL  2/10/2022:  5:39 AM Creatinine 0.70 mg/dL    Recent Vancomycin Level(s) for last 3 days  No results found for requested labs within last 72 hours.      Vancomycin IV Administrations (past 72 hours)      No vancomycin orders with administrations in past 72 hours.                Nephrotoxins and other renal medications (From now, onward)    Start     Dose/Rate Route Frequency Ordered Stop    02/10/22 1230  vancomycin (VANCOCIN) 1,250 mg in sodium chloride 0.9 % 250 mL intermittent infusion         1,250 mg  over 90 Minutes Intravenous EVERY 12 HOURS 02/10/22 1206            Contrast Orders - past 72 hours (72h ago, onward)            Start     Dose/Rate Route Frequency Ordered Stop    22 1800  iopamidol (ISOVUE-370) solution 75 mL  Status:  Discontinued         75 mL Intravenous ONCE 22 1759 02/10/22 0445          Insight[employer] Prediction of Planned Initial Vancomycin Regimen    Regimen: 1250 mg IV every 12 hours.  Start time: 12:10 on 02/10/2022  Exposure target: AUC24 (range)400-600 mg/L.hr   AUC24,ss: 483 mg/L.hr  Probability of AUC24 > 400: 70 %  Ctrough,ss: 14.1 mg/L  Probability of Ctrough,ss > 20: 24 %  Probability of nephrotoxicity (Lodise VU ): 9 %        Plan:  1. Start vancomycin  1250 mg IV q12h.   2. Vancomycin monitoring method: AUC  3. Vancomycin therapeutic monitoring goal: 400-600 mg*h/L  4. Pharmacy will check vancomycin levels as appropriate in 1-3 Days.    5. Serum creatinine levels will be ordered a minimum of twice weekly.      Wes Khan, PharmD, BCPS

## 2022-02-10 NOTE — PLAN OF CARE
Pt A/ox4 CMS intact. C/o moderate neck pain managed with scheduled tylenol. Fair appetite. Pt had CT and MRI. Collar on at all times. Assist of 1 to bathroom. NPO at midnight. Potassium WNL. Planning for another surgery in coming days. Therapy planning to work with patient after unless needed beforehand. Voding spontaneoulsy, frequency. PVR 66ml.

## 2022-02-10 NOTE — OR NURSING
Ortho resident notified unable to release signed and held orders due to needing second sign.  Ortho resident called back, stated he would notify staff surgeon to sign.  Dr. Calixto at bedside, notified of issue.  MD states he is unable to second sign.  Verbal orders placed for tylenol and gabapentin, other orders remain unreleased at this time.

## 2022-02-10 NOTE — OR NURSING
Patient transported to [facility] unit 8A, report called to unit and given to EMS team.  At time of transfer patient drowsy, calm and oriented x 3.

## 2022-02-10 NOTE — CONSULTS
Care Management Initial Consult    General Information  Assessment completed with: Patient, Patient and spouse, Angeline   Type of CM/SW Visit: Initial Assessment    Primary Care Provider verified and updated as needed: Yes   Readmission within the last 30 days: no previous admission in last 30 days   Reason for Consult: discharge planning  Advance Care Planning: No ACP documents in chart        Communication Assessment  Patient's communication style: spoken language (English or Bilingual)    Hearing Difficulty or Deaf: no   Wear Glasses or Blind: no    Cognitive  Cognitive/Neuro/Behavioral: WDL  Level of Consciousness: alert  Arousal Level: opens eyes spontaneously  Orientation: oriented x 4  Mood/Behavior: calm  Best Language: 0 - No aphasia  Speech: spontaneous    Living Environment:   People in home: spouse  Angeline  Current living Arrangements: house ([place] )      Able to return to prior arrangements:  TBD       Family/Social Support:  Care provided by: self  Provides care for: no one  Marital Status:   Description of Support System: Supportive,Involved         Current Resources:   Patient receiving home care services: No   Community Resources: None  Equipment currently used at home: none  Supplies currently used at home: None    Employment/Financial:  Employment Status: employed full-time (Property Maintenance )      Financial Concerns: No concerns identified     Lifestyle & Psychosocial Needs:  Social Determinants of Health     Tobacco Use: Medium Risk     Smoking Tobacco Use: Former Smoker     Smokeless Tobacco Use: Never Used   Alcohol Use: Not on file   Financial Resource Strain: Not on file   Food Insecurity: Not on file   Transportation Needs: Not on file   Physical Activity: Not on file   Stress: Not on file   Social Connections: Not on file   Intimate Partner Violence: Not on file   Depression: Not on file   Housing Stability: Not on file       Functional Status:  Prior to admission patient needed  "assistance:   Dependent ADLs:: Independent  Dependent IADLs:: Independent       Mental Health Status:  Chemical Dependency Status:  Values/Beliefs:  Spiritual, Cultural Beliefs, Druze Practices, Values that affect care:                 Additional Information:  Per notes, patient is a \"51 year old male s/p ACDF C5-6,C7-T1, T1-T2 on 2/9 with Dr. Calixto for epidural abscess and discitis\".    Met with patient and his wife, Angeline to introduce self/role and complete initial assessment. Patient and wife [place]. He is independent and works full time at baseline. He declined having any DME or services.     Patient and Angeline are aware that IV abx are anticipated at discharge. They agreed for writer to send a referral to [facility] ([facility]) for an insurance benefit check. Discharge plan is TBD at this time. PT/OT evals are pending. Care Management will continue to monitor progression of care, review team recommendations and provide discharge planning assist as needed.         KENNETH Coronel RNCC  RN Care Coordinator   Office: [phone]   Pager: [phone]  Weekend pager: [phone]        "

## 2022-02-10 NOTE — OR NURSING
Patient alert and oriented to self, place and time but disoriented to situation post-op.  Pt paranoid, insisting people are coming to kill him.  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  Pt increasingly agitated, MDA Deysi Srinivasan notified.  Orders received to give 2mg Versed which worked well to calm patient.

## 2022-02-10 NOTE — PHARMACY-ADMISSION MEDICATION HISTORY
"  Admission Medication History Completed by Pharmacy    See [facility] Admission Navigator for allergy information, preferred outpatient pharmacy, prior to admission medications and immunization status.     Medication History Sources:     Patient's wife (Angeline), Fill Hx, and Patient    Changes made to PTA medication list (reason):    Added: entire list except for adderall    Deleted: cyclobenzaprine 10mg tab    Changed: None    Additional Information:    Patient has history of taking cyclobenzaprine, on interview patient's wife stated it made him \"goofy\"    Patient's wife stated baclofen, gabapentin, hydroxyzine were new and \"short-term\" for helping with patient's pain issues PTA    Also has recent fill history of meloxicam 15mg tab (unknown directions) and diazepam 5mg (take 10mg by mouth once prior to MRI)    Prior to Admission medications    Medication Sig Last Dose Taking? Auth Provider   acetaminophen (TYLENOL) 500 MG tablet Take 500-1,000 mg by mouth every 6 hours as needed for mild pain Past Week at Unknown time Yes Unknown, Entered By History   amphetamine-dextroamphetamine (ADDERALL XR) 30 MG 24 hr capsule Take 30 mg by mouth daily Past Week at Unknown time Yes Reported, Patient   baclofen (LIORESAL) 10 MG tablet Take 5-10 mg by mouth 3 times daily as needed for muscle spasms Past Week at Unknown time Yes Unknown, Entered By History   camphor-menthol-methyl sal 4-10-30 % CREA Externally apply topically 3 times daily as needed (pain) Past Month at Unknown time Yes Unknown, Entered By History   cetirizine (ZYRTEC) 10 MG tablet Take 10 mg by mouth daily Past Week at Unknown time Yes Unknown, Entered By History   FLUoxetine (PROZAC) 10 MG capsule Take 10 mg by mouth daily Take with the 20mg capsule for a total daily dose of 30mg Past Week at Unknown time Yes Unknown, Entered By History   FLUoxetine (PROZAC) 20 MG capsule Take 20 mg by mouth daily Take with the 10mg capsule for a total daily dose of 30mg Past Week at " Unknown time Yes Unknown, Entered By History   gabapentin (NEURONTIN) 300 MG capsule Take 300mg by mouth at bedtime for 2 days, then increase to 600mg at bedtime daily Past Week at Unknown time Yes Unknown, Entered By History   hydrOXYzine (ATARAX) 25 MG tablet Take 25-50 mg by mouth 3 times daily as needed (pain or spasm) Past Week at Unknown time Yes Unknown, Entered By History   ibuprofen (ADVIL/MOTRIN) 200 MG tablet Take 200 mg by mouth every 4 hours as needed for mild pain Past Week at Unknown time Yes Unknown, Entered By History   menthol (ICY HOT) 5 % PTCH Apply 1 patch topically every 8 hours as needed for muscle soreness or moderate pain Past Month at Unknown time Yes Unknown, Entered By History   Menthol, Topical Analgesic, (BIOFREEZE EX) Externally apply topically 4 times daily as needed (pain) Past Month at Unknown time Yes Unknown, Entered By History   VITAMIN D PO Take 1 tablet by mouth daily Past Month at Unknown time Yes Unknown, Entered By History       Date completed: 02/10/22    Medication history completed by: Son Bowden, Pharmacy Student

## 2022-02-10 NOTE — PLAN OF CARE
"/68 (BP Location: Left arm)   Pulse 109   Temp 98.3  F (36.8  C) (Oral)   Resp 18   Ht 1.753 m (5' 9\")   Wt 78.9 kg (174 lb)   SpO2 96%   BMI 25.70 kg/m      Patient arrived unit around 0445. Patient very agitated. Took off oxygen. Took off soft collar. Patient educated on keeping soft collar on at all time. Patient verbalize he will not place on soft collar until all other stuff are out such as tapes, allred, capno NC cord. Patient denies pain, denies numbness and tingling. Demanding allred catheter out or patient going to pull it out himself. Writer pulled allred catheter. Patient voided twice. Patient demanding DIANELYS drains out. Patient wanted to pull it out himself. Writer explained the need for DIANELYS drain. Patient states he understood after education. Tachycardia at baseline. Patient ambulated in the room on stand by. Continue to monitor POC.     "

## 2022-02-10 NOTE — ANESTHESIA CARE TRANSFER NOTE
Patient: Dave Calero    Procedure: Procedure(s):  Anterior cervical diskectomy and fusion, cervical 5 to thoracic 1 (3 levels); right or anterior iliac crest autograft harvest.        Diagnosis: Cervical discitis [M46.42]  Cervical spondylosis with myelopathy [M47.12]  Diagnosis Additional Information: No value filed.    Anesthesia Type:   General     Note:    Oropharynx: spontaneously breathing  Level of Consciousness: awake  Oxygen Supplementation: nasal cannula  Level of Supplemental Oxygen (L/min / FiO2): 4  Independent Airway: airway patency satisfactory and stable  Dentition: dentition unchanged  Vital Signs Stable: post-procedure vital signs reviewed and stable  Report to RN Given: handoff report given  Patient transferred to: PACU  Comments: Airway :Face Mask  Patient transferred to:PACU  Comments: Prior to extubation, patient was observed with spontaneous breathing with regular pattern and proper tidal volumes. Patient woke up, opened their eyes to verbal stimuli and followed basic commands. Patient was suctioned and extubated without complication.   Transported to PACU on 4L O2 via nasal cannula.   VSS upon arrival to PACU.  Patient denies nausea or pain at this time.   Care transfer plan communicated, appropriate time for questions was given and patient care transferred to PACU RN       Song Cazares MD      Handoff Report: Identifed the Patient, Identified the Reponsible Provider, Reviewed the pertinent medical history, Discussed the surgical course, Reviewed Intra-OP anesthesia mangement and issues during anesthesia, Set expectations for post-procedure period and Allowed opportunity for questions and acknowledgement of understanding      Vitals:  Vitals Value Taken Time   /80 02/10/22 0300   Temp     Pulse 122 02/10/22 0304   Resp     SpO2 97 % 02/10/22 0304   Vitals shown include unvalidated device data.    Electronically Signed By: Song Cazares MD  February 10,  2022  3:04 AM

## 2022-02-11 ENCOUNTER — HOME INFUSION (PRE-WILLOW HOME INFUSION) (OUTPATIENT)
Dept: PHARMACY | Facility: CLINIC | Age: 52
End: 2022-02-11
Payer: COMMERCIAL

## 2022-02-11 ENCOUNTER — ANESTHESIA EVENT (OUTPATIENT)
Dept: SURGERY | Facility: CLINIC | Age: 52
DRG: 453 | End: 2022-02-11
Payer: COMMERCIAL

## 2022-02-11 ENCOUNTER — APPOINTMENT (OUTPATIENT)
Dept: CARDIOLOGY | Facility: CLINIC | Age: 52
DRG: 453 | End: 2022-02-11
Attending: INTERNAL MEDICINE
Payer: COMMERCIAL

## 2022-02-11 LAB
ANION GAP SERPL CALCULATED.3IONS-SCNC: 5 MMOL/L (ref 3–14)
BACTERIA UR CULT: NO GROWTH
BASOPHILS # BLD AUTO: 0.1 10E3/UL (ref 0–0.2)
BASOPHILS NFR BLD AUTO: 1 %
BUN SERPL-MCNC: 10 MG/DL (ref 7–30)
CALCIUM SERPL-MCNC: 8.8 MG/DL (ref 8.5–10.1)
CHLORIDE BLD-SCNC: 104 MMOL/L (ref 94–109)
CO2 SERPL-SCNC: 28 MMOL/L (ref 20–32)
CREAT SERPL-MCNC: 0.69 MG/DL (ref 0.66–1.25)
EOSINOPHIL # BLD AUTO: 0.4 10E3/UL (ref 0–0.7)
EOSINOPHIL NFR BLD AUTO: 4 %
ERYTHROCYTE [DISTWIDTH] IN BLOOD BY AUTOMATED COUNT: 12.8 % (ref 10–15)
GFR SERPL CREATININE-BSD FRML MDRD: >90 ML/MIN/1.73M2
GLUCOSE BLD-MCNC: 110 MG/DL (ref 70–99)
HCT VFR BLD AUTO: 28.8 % (ref 40–53)
HGB BLD-MCNC: 9.1 G/DL (ref 13.3–17.7)
HOLD SPECIMEN: NORMAL
IMM GRANULOCYTES # BLD: 0.1 10E3/UL
IMM GRANULOCYTES NFR BLD: 0 %
LVEF ECHO: NORMAL
LYMPHOCYTES # BLD AUTO: 3.3 10E3/UL (ref 0.8–5.3)
LYMPHOCYTES NFR BLD AUTO: 29 %
MCH RBC QN AUTO: 27.2 PG (ref 26.5–33)
MCHC RBC AUTO-ENTMCNC: 31.6 G/DL (ref 31.5–36.5)
MCV RBC AUTO: 86 FL (ref 78–100)
MONOCYTES # BLD AUTO: 0.9 10E3/UL (ref 0–1.3)
MONOCYTES NFR BLD AUTO: 8 %
NEUTROPHILS # BLD AUTO: 6.4 10E3/UL (ref 1.6–8.3)
NEUTROPHILS NFR BLD AUTO: 58 %
NRBC # BLD AUTO: 0 10E3/UL
NRBC BLD AUTO-RTO: 0 /100
PLATELET # BLD AUTO: 588 10E3/UL (ref 150–450)
POTASSIUM BLD-SCNC: 3.8 MMOL/L (ref 3.4–5.3)
RBC # BLD AUTO: 3.34 10E6/UL (ref 4.4–5.9)
SODIUM SERPL-SCNC: 137 MMOL/L (ref 133–144)
WBC # BLD AUTO: 11.2 10E3/UL (ref 4–11)

## 2022-02-11 PROCEDURE — 250N000011 HC RX IP 250 OP 636: Performed by: STUDENT IN AN ORGANIZED HEALTH CARE EDUCATION/TRAINING PROGRAM

## 2022-02-11 PROCEDURE — 120N000002 HC R&B MED SURG/OB UMMC

## 2022-02-11 PROCEDURE — 36415 COLL VENOUS BLD VENIPUNCTURE: CPT | Performed by: STUDENT IN AN ORGANIZED HEALTH CARE EDUCATION/TRAINING PROGRAM

## 2022-02-11 PROCEDURE — 85025 COMPLETE CBC W/AUTO DIFF WBC: CPT | Performed by: STUDENT IN AN ORGANIZED HEALTH CARE EDUCATION/TRAINING PROGRAM

## 2022-02-11 PROCEDURE — 250N000013 HC RX MED GY IP 250 OP 250 PS 637: Performed by: STUDENT IN AN ORGANIZED HEALTH CARE EDUCATION/TRAINING PROGRAM

## 2022-02-11 PROCEDURE — 255N000002 HC RX 255 OP 636: Performed by: INTERNAL MEDICINE

## 2022-02-11 PROCEDURE — 82310 ASSAY OF CALCIUM: CPT | Performed by: STUDENT IN AN ORGANIZED HEALTH CARE EDUCATION/TRAINING PROGRAM

## 2022-02-11 PROCEDURE — 99232 SBSQ HOSP IP/OBS MODERATE 35: CPT | Performed by: INTERNAL MEDICINE

## 2022-02-11 PROCEDURE — 93306 TTE W/DOPPLER COMPLETE: CPT | Mod: 26 | Performed by: STUDENT IN AN ORGANIZED HEALTH CARE EDUCATION/TRAINING PROGRAM

## 2022-02-11 PROCEDURE — 258N000003 HC RX IP 258 OP 636: Performed by: INTERNAL MEDICINE

## 2022-02-11 PROCEDURE — 36415 COLL VENOUS BLD VENIPUNCTURE: CPT | Performed by: INTERNAL MEDICINE

## 2022-02-11 PROCEDURE — 999N000208 ECHOCARDIOGRAM COMPLETE

## 2022-02-11 PROCEDURE — 250N000011 HC RX IP 250 OP 636: Performed by: INTERNAL MEDICINE

## 2022-02-11 PROCEDURE — 87077 CULTURE AEROBIC IDENTIFY: CPT | Performed by: INTERNAL MEDICINE

## 2022-02-11 PROCEDURE — 99233 SBSQ HOSP IP/OBS HIGH 50: CPT | Performed by: INTERNAL MEDICINE

## 2022-02-11 RX ADMIN — Medication 50 MCG: at 07:51

## 2022-02-11 RX ADMIN — GABAPENTIN 300 MG: 300 CAPSULE ORAL at 19:41

## 2022-02-11 RX ADMIN — VANCOMYCIN HYDROCHLORIDE 1250 MG: 1 INJECTION, POWDER, LYOPHILIZED, FOR SOLUTION INTRAVENOUS at 17:19

## 2022-02-11 RX ADMIN — Medication 2 G: at 08:15

## 2022-02-11 RX ADMIN — OXYCODONE HYDROCHLORIDE 10 MG: 10 TABLET ORAL at 04:59

## 2022-02-11 RX ADMIN — HYDROMORPHONE HYDROCHLORIDE 0.4 MG: 0.2 INJECTION, SOLUTION INTRAMUSCULAR; INTRAVENOUS; SUBCUTANEOUS at 19:41

## 2022-02-11 RX ADMIN — FAMOTIDINE 20 MG: 20 TABLET, FILM COATED ORAL at 07:53

## 2022-02-11 RX ADMIN — OXYCODONE HYDROCHLORIDE 10 MG: 10 TABLET ORAL at 09:06

## 2022-02-11 RX ADMIN — OXYCODONE HYDROCHLORIDE 10 MG: 10 TABLET ORAL at 01:27

## 2022-02-11 RX ADMIN — HUMAN ALBUMIN MICROSPHERES AND PERFLUTREN 5 ML: 10; .22 INJECTION, SOLUTION INTRAVENOUS at 14:44

## 2022-02-11 RX ADMIN — SODIUM CHLORIDE: 9 INJECTION, SOLUTION INTRAVENOUS at 05:48

## 2022-02-11 RX ADMIN — SENNOSIDES AND DOCUSATE SODIUM 1 TABLET: 50; 8.6 TABLET ORAL at 07:50

## 2022-02-11 RX ADMIN — ACETAMINOPHEN 975 MG: 325 TABLET, FILM COATED ORAL at 16:26

## 2022-02-11 RX ADMIN — METHOCARBAMOL 750 MG: 750 TABLET ORAL at 05:01

## 2022-02-11 RX ADMIN — Medication 2 G: at 00:00

## 2022-02-11 RX ADMIN — FAMOTIDINE 20 MG: 20 TABLET, FILM COATED ORAL at 19:41

## 2022-02-11 RX ADMIN — METHOCARBAMOL 750 MG: 750 TABLET ORAL at 16:33

## 2022-02-11 RX ADMIN — HYDROXYZINE HYDROCHLORIDE 25 MG: 25 TABLET ORAL at 05:00

## 2022-02-11 RX ADMIN — OXYCODONE HYDROCHLORIDE 10 MG: 10 TABLET ORAL at 13:33

## 2022-02-11 RX ADMIN — ACETAMINOPHEN 975 MG: 325 TABLET, FILM COATED ORAL at 07:05

## 2022-02-11 RX ADMIN — VANCOMYCIN HYDROCHLORIDE 1250 MG: 1 INJECTION, POWDER, LYOPHILIZED, FOR SOLUTION INTRAVENOUS at 03:14

## 2022-02-11 RX ADMIN — Medication 2 G: at 16:13

## 2022-02-11 RX ADMIN — SENNOSIDES AND DOCUSATE SODIUM 1 TABLET: 50; 8.6 TABLET ORAL at 19:41

## 2022-02-11 RX ADMIN — OXYCODONE HYDROCHLORIDE 10 MG: 10 TABLET ORAL at 17:26

## 2022-02-11 RX ADMIN — GABAPENTIN 300 MG: 300 CAPSULE ORAL at 07:52

## 2022-02-11 ASSESSMENT — ACTIVITIES OF DAILY LIVING (ADL)
ADLS_ACUITY_SCORE: 5
ADLS_ACUITY_SCORE: 5
ADLS_ACUITY_SCORE: 4
ADLS_ACUITY_SCORE: 5
ADLS_ACUITY_SCORE: 4
ADLS_ACUITY_SCORE: 4
ADLS_ACUITY_SCORE: 5
ADLS_ACUITY_SCORE: 4
ADLS_ACUITY_SCORE: 5
ADLS_ACUITY_SCORE: 4
ADLS_ACUITY_SCORE: 5
ADLS_ACUITY_SCORE: 4
ADLS_ACUITY_SCORE: 5
ADLS_ACUITY_SCORE: 5

## 2022-02-11 NOTE — ANESTHESIA PREPROCEDURE EVALUATION
Anesthesia Pre-Procedure Evaluation    Patient: Dave Calero   MRN: [id number] : 1970        Preoperative Diagnosis: Epidural abscess [G06.2]  Discitis of cervical region [M46.42]  Cervical spondylosis with myelopathy [M47.12]    Procedure : Procedure(s):  Posterior instrumented spinal fusion cervical 5 to thoracic 2, with laminectomies.          History reviewed. No pertinent past medical history.   Past Surgical History:   Procedure Laterality Date     OPTICAL TRACKING SYSTEM FUSION POSTERIOR SPINE LUMBAR N/A 2022    Procedure: Anterior cervical diskectomy and fusion, cervical 5 to thoracic 1 (3 levels); right or anterior iliac crest autograft harvest. ;  Surgeon: Kulwinder Calixto MD;  Location:  OR      No Known Allergies   Social History     Tobacco Use     Smoking status: Former Smoker     Smokeless tobacco: Never Used   Substance Use Topics     Alcohol use: Not on file      Wt Readings from Last 1 Encounters:   22 78.9 kg (174 lb)        Anesthesia Evaluation   Pt has had prior anesthetic. Type: General.    History of anesthetic complications   emergence delerium/paranoia--does NOT want urinary catheter when he wakes up.    ROS/MED HX  ENT/Pulmonary:  - neg pulmonary ROS     Neurologic: Comment: Bilateral medial ulnar paresthesias, chronic    ADHD      Cardiovascular:     (+) Dyslipidemia -----Previous cardiac testing   Echo: Date: 2022 Results:  Global and regional left ventricular function is normal with an EF of 55-60%.  The right ventricle is normal in function and size.  No significant valvular abnormality.  No pericardial effusion is present.  IVC diameter <2.1 cm collapsing >50% with sniff suggests a normal RA pressure  of 3 mmHg.  There is no prior study for direct comparison.  Stress Test: Date: Results:    ECG Reviewed: Date: 2022 Results:  Sinus tachycardia  Cath: Date: Results:   (-) murmur   METS/Exercise Tolerance: >4 METS    Hematologic:     (+)  anemia,     Musculoskeletal: Comment: Neck pain    Epidural abscess, s/p fusion 2/9      GI/Hepatic:  - neg GI/hepatic ROS     Renal/Genitourinary:  - neg Renal ROS     Endo:  - neg endo ROS     Psychiatric/Substance Use:     (+) psychiatric history depression     Infectious Disease: Comment: Epidural abscess      Malignancy:  - neg malignancy ROS     Other:  - neg other ROS          Physical Exam    Airway      Comment: Neck collar in place. mouthopening approx 2 cm with collar.         Respiratory Devices and Support         Dental  no notable dental history         Cardiovascular   cardiovascular exam normal       Rhythm and rate: regular and normal (-) no murmur    Pulmonary   pulmonary exam normal        breath sounds clear to auscultation           OUTSIDE LABS:  CBC:   Lab Results   Component Value Date    WBC 11.2 (H) 02/11/2022    WBC 13.9 (H) 02/09/2022    HGB 9.1 (L) 02/11/2022    HGB 8.3 (L) 02/10/2022    HCT 28.8 (L) 02/11/2022    HCT 38.5 (L) 02/09/2022     (H) 02/11/2022     (H) 02/09/2022     BMP:   Lab Results   Component Value Date     02/11/2022     02/10/2022    POTASSIUM 3.8 02/11/2022    POTASSIUM 4.4 02/10/2022    CHLORIDE 104 02/11/2022    CHLORIDE 108 02/10/2022    CO2 28 02/11/2022    CO2 26 02/10/2022    BUN 10 02/11/2022    BUN 9 02/10/2022    CR 0.69 02/11/2022    CR 0.70 02/10/2022     (H) 02/11/2022     (H) 02/10/2022     COAGS:   Lab Results   Component Value Date    INR 0.98 02/09/2022     POC: No results found for: BGM, HCG, HCGS  HEPATIC:   Lab Results   Component Value Date    ALBUMIN 2.8 (L) 02/09/2022    PROTTOTAL 8.5 02/09/2022    ALT 44 02/09/2022    AST 12 02/09/2022    ALKPHOS 133 02/09/2022    BILITOTAL 0.3 02/09/2022     OTHER:   Lab Results   Component Value Date    LACT 1.5 02/09/2022    VIANEY 8.8 02/11/2022    CRP 74.0 (H) 02/09/2022       Anesthesia Plan    ASA Status:  3, emergent    NPO Status:  NPO Appropriate    Anesthesia  Type: General.     - Airway: ETT   Induction: Propofol.   Maintenance: TIVA.   Techniques and Equipment:     - Airway: Video-Laryngoscope (glidescope)     - Lines/Monitors: 2nd IV, Arterial Line (recently had left art line. Ultrasound)     - Drips/Meds: Ketamine, Sufentanil, Dexmed. bolus (lido, prop)     Consents    Anesthesia Plan(s) and associated risks, benefits, and realistic alternatives discussed. Questions answered and patient/representative(s) expressed understanding.    - Discussed:     - Discussed with:  Patient      - Extended Intubation/Ventilatory Support Discussed: Yes.      - Patient is DNR/DNI Status: No    Use of blood products discussed: Yes.     - Discussed with: Patient.     - Consented: consented to blood products            Reason for refusal: other.     Postoperative Care    Pain management: Multi-modal analgesia, Oral pain medications, IV analgesics.   PONV prophylaxis: Background Propofol Infusion, Dexamethasone or Solumedrol, Ondansetron (or other 5HT-3)     Comments:    Other Comments: Discussed risks of anesthesia including nausea, vomiting, sore throat, dental damage, cardiopulmonary complications, agitation, neurologic complications, and serious complications.            Rubi Garcia MD

## 2022-02-11 NOTE — PROGRESS NOTES
"Care Management Follow Up    Length of Stay (days): 2    Expected Discharge Date: TBD     Concerns to be Addressed: discharge planning     Patient plan of care discussed at interdisciplinary rounds: Yes    Anticipated Discharge Disposition: Home,Home Infusion, pending     Additional Information:  Per [facility], patient \"has coverage for iv abx with his [employer] plan, family ded $4000.00 met $817.00, 80/20 coverage, Individual oop $4000 met $32.18. Also [employer] would like the patient to call them to update his coordination of benefits. They periodically need to make sure they are still the only insurance for the patient\".    11:32 AM  Attempted to reach patient by phone but he did not answer.       KENNETH Coronel RNCC  RN Care Coordinator   Office: [phone]   Pager: [phone]  Weekend pager: [phone]        "

## 2022-02-11 NOTE — PLAN OF CARE
Pt s/p discectomy and spinal fusion C5-T1 2/2 cervical discitis.  Mi[place] J collar at all times. Dressing appears CDI. 2 DIANELYS drains draining small amt of serosaguinous drainage. A&Ox4. Pain well managed at this time.  Pt experiences muscle spasms improved with Robaxin & heat therapy. BUE tingling noted. Pt states he has had this tingling prior to surgery, no worsening symptoms. SBA w/ mobility, ambulates to BR w/o difficulty, voids w/o difficulty. Lungs CTA. Heart RRR. Dressing to R thigh graft site CDI. NPO after midnight in prep for potential additional surgery today. Had a difficult time falling asleep, but appears to be resting comfortably at this time. Will c/t monitor.

## 2022-02-11 NOTE — PROGRESS NOTES
Pt was seen, course reviewed with team, ID    Pt c/o moderate neck pain, slept a little better last night  He notes generalized chest pain, does not recall any particular trauma to chest  No cough, SOB  Some pain with swallowing, he appears to be tolerating an oral diet    Afebrile  VSS  02 sats mid 90s RA    Sleepy, easily alerts, fully oriented, appears comfortable  RR 12  Lungs clear  Cv rrr no M  + tenderness with palp diffusely over anterior chest  Abd soft  No LE edema      Results for SONYA ARAUJO (MRN [id number]) as of 2/11/2022 13:09   Ref. Range 2/11/2022 05:40   Sodium Latest Ref Range: 133 - 144 mmol/L 137   Potassium Latest Ref Range: 3.4 - 5.3 mmol/L 3.8   Chloride Latest Ref Range: 94 - 109 mmol/L 104   Carbon Dioxide Latest Ref Range: 20 - 32 mmol/L 28   Urea Nitrogen Latest Ref Range: 7 - 30 mg/dL 10   Creatinine Latest Ref Range: 0.66 - 1.25 mg/dL 0.69   GFR Estimate Latest Ref Range: >60 mL/min/1.73m2 >90   Calcium Latest Ref Range: 8.5 - 10.1 mg/dL 8.8   Anion Gap Latest Ref Range: 3 - 14 mmol/L 5   Glucose Latest Ref Range: 70 - 99 mg/dL 110 (H)   WBC Latest Ref Range: 4.0 - 11.0 10e3/uL 11.2 (H)   Hemoglobin Latest Ref Range: 13.3 - 17.7 g/dL 9.1 (L)   Hematocrit Latest Ref Range: 40.0 - 53.0 % 28.8 (L)   Platelet Count Latest Ref Range: 150 - 450 10e3/uL 588 (H)   RBC Count Latest Ref Range: 4.40 - 5.90 10e6/uL 3.34 (L)   MCV Latest Ref Range: 78 - 100 fL 86   MCH Latest Ref Range: 26.5 - 33.0 pg 27.2   MCHC Latest Ref Range: 31.5 - 36.5 g/dL 31.6   RDW Latest Ref Range: 10.0 - 15.0 % 12.8   % Neutrophils Latest Units: % 58   % Lymphocytes Latest Units: % 29   % Monocytes Latest Units: % 8   % Eosinophils Latest Units: % 4   % Basophils Latest Units: % 1   Absolute Basophils Latest Ref Range: 0.0 - 0.2 10e3/uL 0.1   Absolute Eosinophils Latest Ref Range: 0.0 - 0.7 10e3/uL 0.4   Absolute Immature Granulocytes Latest Ref Range: <=0.4 10e3/uL 0.1   Absolute Lymphocytes Latest Ref  Range: 0.8 - 5.3 10e3/uL 3.3   Absolute Monocytes Latest Ref Range: 0.0 - 1.3 10e3/uL 0.9       Tissue culture Staph aureus  BC (2/9)    Positive on the 1st day of incubation Abnormal  P    Granulicatella adiacens Panic  P    1 of 2 bottles    Staphylococcus aureus Panic  P         Assessment/plan    Cervical epidural abscess with discitis, intra op culture as above, and with BC + for 2 organisms.  Etiology unclear, in absence of any recent procedures or clinical indication of occult infection (including dental infection  Stable hemodynamically. Pt remains on Ancef and Vancomycin per ID  + dysphagia, but Pt appears able to tolerate diet  Plan antibiotics per ID.  Return to OR tomorrow. Repeat BC  TTE for now, will need PREM  Consider dental imaging at some point  Monitor swallowing    Chest pain, most likely chest wall, ? Related to positioning during surgery  Plan monitor    Anemia  Secondary to surgery, acute infection  Plan monitor

## 2022-02-11 NOTE — PROGRESS NOTES
"  Orthopaedic Surgery Daily Progress Note     Subjective: Dave Calero is a 51 year old male POD # 1 s/p ACDF with plan for RTOR tomorrow.   Pain is well controlled. Tolerating an oral diet. No fever, chills. No chest pain or shortness of breath. No abdominal pain, nausea, vomiting. No diarrhea or constipation. No urinary difficulties.     Physical Exam   /81 (BP Location: Left arm, Patient Position: Semi-Salas's)   Pulse 106   Temp 97.2  F (36.2  C) (Oral)   Resp 18   Ht 1.753 m (5' 9\")   Wt 78.9 kg (174 lb)   SpO2 96%   BMI 25.70 kg/m      Intake/Output Summary (Last 24 hours) at 2/11/2022 0634  Last data filed at 2/10/2022 2200  Gross per 24 hour   Intake 240 ml   Output 1035 ml   Net -795 ml     General: Alert, well-appearing in no acute distress.  Respiratory: Non-labored breathing.   Cardiovascular: Extremities warm and well perfused  Extremities: moving all four extremities.     Cervical spine:    Appearance -no gross step-offs, kyphosis.    Motor -     C5: Deltoids R 5/5 and L 5/5 strength    C6: Biceps R 5/5 and L 5/5 strength     C7: Triceps R 5/5 and L 5/5 strength     C8:  R 5/5 and L 5/5 strength     T1: Dorsal interossei R 4/5 and L 4/5 strength        Sensation: intact to light touch in C5-T1      Special Tests -      Lhermitte's Test - Negative     Spurling's Test - Negative      José's Test - Negative      Hip Exam:  No pain with hip log roll and no tenderness over the greater trochanters.    Alignment:  Patient stands with a neutral standing sagittal balance.    Dressing CDI    Skin: As noted above. No rashes or lesions appreciated.    Labs    Complete Blood Count   Recent Labs   Lab 02/11/22  0540 02/10/22  0544 02/09/22  1438   WBC 11.2*  --  13.9*   HGB 9.1* 8.3* 12.1*   *  --  733*     Basic Metabolic Panel  Recent Labs   Lab 02/11/22  0540 02/10/22  0916 02/10/22  0539 02/09/22  2112 02/09/22  1438     --  136  --  137   POTASSIUM 3.8 4.4 5.9*  --  " 3.4   CHLORIDE 104  --  108  --  102   CO2  --   --  26  --  30   BUN  --   --  9  --  12   CR  --   --  0.70  --  0.83   GLC  --   --  433* 100* 130*     Coagulation Profile  Recent Labs   Lab 02/09/22  1438   INR 0.98     Assessment/Plan:  Assessment: Dave Calero is a 51 year old male s/p ACDF C5-6,C7-T1, T1-T2 on 2/9 with Dr. Calixto for epidural abscess and discitis     PLAN for RTOR tomorrow  -NPO at midnight  -MRI of cervical spine today      Plan:  Orthopedics Primary  Activity: Up with assist until independent. No excessive bending or twisting. No lifting >10 lbs x 6 weeks. No Scar lift for transfers.   Weight bearing status: WBAT.  Pain management: Transition from IV to PO as tolerated.    Antibiotics: Ancef until antibiotic plan developed  Diet: Begin with clear fluids and progress diet as tolerated.   DVT prophylaxis: SCDs only. No chemical DVT ppx needed at discharge.  Labs: Hgb POD#1.  Bracing/Splinting: Miami J -ordered.  Dressings: Keep dressing CDI.  Drains: Document output per shift, will be discontinued at Orthopedic Surgery discretion.  Jerry catheter: can be removed POD1-2 pending plan for RTOR.   Physical Therapy/Occupational Therapy: Eval and treat.  Cultures: Pending, follow culture results closely.    Consults: IM,ID, PT, OT, orthotics.  Follow-up: TBD  Disposition: admitted to orthopedics     Stanley Stanley MD   Orthopaedic Surgery Resident, PGY-4  P: [phone]    Discussed with Dr. Calixto

## 2022-02-11 NOTE — PROGRESS NOTES
D: Pt is A/Ox4  VSS: BP- 135/80; P-106  Lungs: Clear, infrequent productive cough, no c/o of chest pain  O2 Sat: 94% RA  No IS  Bowel/Bladder: Last BM 2/10/2022; Pt up with stand-by assist to bathroom; adequate voiding/good amount; passing gas  No edema  CMS:  No skin issues, mepilex dressing on coccyx for prevention; skin is clean, dry and intact  Pt taking PO  regular food and fluids, tolerates well   Pain: 6/10  Dressing: Right hip dressing- clean, dry and intact; Neck incision dressings- clean, dry and intact  DIANELYS: serosanguineous drainage very little/scant, not enough to measure  IV: Left PIV saline locked     A: Continue to monitor; Call light in reach; Pt able to make needs known; allowed pt to sleep due to no sleep last night; Pt wears Miami J C-Collar at all times; PT IS NPO AFTER MIDNIGHT!

## 2022-02-11 NOTE — ANESTHESIA POSTPROCEDURE EVALUATION
Patient: Dave Calero    Procedure: Procedure(s):  Anterior cervical diskectomy and fusion, cervical 5 to thoracic 1 (3 levels); right or anterior iliac crest autograft harvest.        Diagnosis:Cervical discitis [M46.42]  Cervical spondylosis with myelopathy [M47.12]  Diagnosis Additional Information: No value filed.    Anesthesia Type:  General    Note:  Disposition: Admission   Postop Pain Control: Uneventful            Sign Out: Well controlled pain   PONV: No   Neuro/Psych:             Events: Emergence delirium            Sign Out: Acceptable/Baseline neuro status   Airway/Respiratory: Uneventful            Sign Out: Acceptable/Baseline resp. status   CV/Hemodynamics: Uneventful            Sign Out: Acceptable CV status; No obvious hypovolemia; No obvious fluid overload   Other NRE: NONE   DID A NON-ROUTINE EVENT OCCUR? YES    Event details/Postop Comments:  Pt awoke paranoid.  No psychiatric or chemical abuse history.  No apparent causative agents given intraoperatively.  Treated with midazolam with satisfactory results.           Last vitals:  Vitals Value Taken Time   /86 02/10/22 0415   Temp 37.4  C (99.4  F) 02/10/22 0400   Pulse 131 02/10/22 0416   Resp 16 02/10/22 0415   SpO2 96 % 02/10/22 0430   Vitals shown include unvalidated device data.    Electronically Signed By: Mannie Downey MD  February 11, 2022  4:24 PM

## 2022-02-11 NOTE — PROGRESS NOTES
Therapy: IV ABX   Insurance: [employer]   Ded: $4000.00  Met: $817.00    Co-Insurance: 80/20  Max Out of Pocket: $4000.00  Met: $32.18  Once OOP is met coverage will be 100%    Please contact Intake with any questions, [phone] or In Basket pool, [facility] ([phone]).

## 2022-02-11 NOTE — OP NOTE
Date of Service:  2/9/2022      Surgeon:  Kulwinder Calixto MD   Assistant:  Stanley Stanley MD PGY-4      Preoperative Diagnosis:     1.  Discitis C7-T1.  2.  Advanced spondylosis with stenosis C5-6 and C6-7.  3.  Cervical myelopathy.  4.  Bilateral cervical radicular pain C8/T1.  5.  Severe refractory neck and upper thoracic pain.      Postoperative Diagnosis:     1.  Discitis C7-T1 and T1-2.  2.  Advanced spondylosis with stenosis C5-6 and C6-7.  3.  Cervical myelopathy.  4.  Bilateral cervical radicular pain C8/T1.  5.  Severe refractory neck and upper thoracic pain.      Procedures:   1.  Anterior cervical diskectomy and fusion (ACDF) C5-6, C7-T1 and T1-2 using iliac crest autograft.   2.  Placement of interbody device (PEEK cages) C5-6,C7-T1 and T1-2.   3.  Anterior disc debridement and irrigation C7-T1 and T1-2.  4.  Right anterior iliac crest cancellous bone graft harvest via separate skin incision.   5.  Use of operating microscope.       A -22 modifier is justified for use in this case given severe tissue inflammation and thickening of prevertebral tissues including logus colli muscles secondary to presumed discitis, necessitating significant extra time and effort > 50% usual spent in dissection, exposure, identification of levels and performance of disc debridement and fusion.  Anesthesia:  General endotracheal.   Local anesthetic:  0.25% marcaine + epinephrine = 20 mL (half in R iliac crest; half in neck).  EBL:  50 mL.  Complications:  None apparent.   Implants / Equipment used:   1.  [employer] cervical PEEK cage, 18x16 mm, 6 mm height x 3 cages (C5-6, C7-T1 and T1-2).  2.  [employer] bone graft harvesting system, drill size 8mm.  3.  Vancomycin powder 1gm deep (50% R iliac crest; 50% c-spine).      Indications:  51 year old male RHD who presented with 1 month history of severe neck pain progressively worsening, and bilateral tingling/numbnes in arms in C8/T1 pattern (medial hand and forearm).  A month ago  on 1/3/22, presented at local ER in [place].  Workup revealed elevated WBC count.  Discharged to home; underwent PT, symptoms continued to progress.  Finally had cervical MRI 2/8/22.  Earlier this morning, was evaluated at outside spine clinic (Dr. Molina, [facility]).  Noted to have discitis, with spinal cord compression.  Directed to come to the [facility] ED.  Was admitted.  I reviewed imaging, recommended urgent surgery.  We negotiated with the operating room, and was scheduled for late night surgery.  I met with patient in the preoperative area.  He was neurologically intact on examination.  However, had severe neck and upper thoracic back pain which limited his participation.  Also was unable to get cervical CT because of severe neck pain and cannot tolerate positioning.  Also reported numbness/tingling over bilateral C8 and T1 dermatomes.  In addition to the discitis, MRI also showed advanced spondylosis with severe canal stenosis at C5-6 and C6-7.  Thus, I recommended two-stage surgery; stage I will be performed tonight and will consist of 3 level anterior cervical discectomy and fusion C5-T1, including debridement of the infection at C7-T1.  I also recommended right anterior iliac crest bone graft harvest.  Patient consented after thorough discussion of the rationale, risks, benefits and alternatives.        Details:  Properly identified in preop area, site marked, consent signed.  Wheeled to operating theater.  Brief earlier performed.  General anesthesia administered.  Antibiotics held until we were able to obtain specimens for cultures, after which he was given cefazolin 2 g IV.  Jerry inserted.  Positioned supine on flat Trios table.  Neuro monitoring electrodes placed by [employer] technician.  Arms wrapped in purple foam wraps and tucked to sides using sleds; shoulders taped down, with gel roll underneath.  Neck slightly extended; had bumped up with towels.  Anterior neck and right pelvic region  squared off and prepped with Chlorhexidine scrub, ChloraPrep and draped in sterile fashion.  Surgical timeout performed.  Baseline spinal cord signals obtained; good signals from all extremities.    One inch incision made 2 fingerbreadths posterior to right ASIS.  Carried down to iliac crest bone.  Divot created using Acumed awl.  Cancellous bone graft harvested using [employer] drill.  Several passes made until harvest volume deemed adequate.  Field irrigated; defect packed with Gelfoam square and sponge.  Attention turned back to spine.    Used lateral C-arm imaging to localize skin incision.  3 cm incision made.  Left anterior Armstrong-Dunbar approach performed.  Platysma split in line with incision.  Dissected medial to SCM and carotid sheath, and lateral to strap muscles, trachea and esophagus; through pretracheal and prevertebral fascia.  I noted significant soft tissue swelling and inflammation.  The longus coli muscle in particular was very thickened and very difficult to retract.  There was some fluid in the anterior prevertebral space.  This was aspirated using syringe and submitted to laboratory for cultures.  I also sampled the thickened longus colli and submitted for cultures as well.  The anterior cervical spine anatomy was also very much altered secondary to the advanced spondylosis and the presumed infection, such that there was hardly any soft spots to indicate where the discs are.  I inserted  gauge 18 spinal needle into a vertebral body to localize levels.  Lateral image showed that the shoulders were quite high and obscuring the lower cervical spine.  I extended our exposure proximally and inserted the needle into the presumed C5-6 disc space.  Lateral image showed needle at C5-6, verified by radiologist; confirmatory timeout performed.  I marked the C5-6 disc level using Bovie. Lifted longus colli bilaterally off spine C5-T1.  I counted levels from C5-6, and first worked 2 levels down, which I  presumed to be C7-T1 (of note, later we would learn that this was actually T1-T2).  Trimline retractor blades placed; frame applied.  [place] pins inserted into adjacent vertebrae.  Distraction applied using [place] frame.  However, the upper screw plowed through the vertebral body; thus, we had to remove the [place] frame and pins and perform the procedure without disc space distraction.  Microscope brought in.  Rectangular annulotomy using #15 blade.  Disc loosened using straight and angled curettes; disc material removed using pituitary.  Disc material submitted to laboratory for cultures.  [place] used to remove anterior and overhanging osteophytes, and prepare the endplates as deep as the posterior annulus.  Posterior osteophytes removed using 2 and 3 mm Kerrisons.  The appearance of the disc as well as the epidural tissues were consistent with infection.  There was no gross pus, but there was significant granulation tissue in the epidural space right greater than left.  This made it very difficult to identify the posterior longitudinal ligament.  The dura likewise did not have the typical smooth and shiny appearance.  Thus, we thought this was indeed C7-T1 which was a level of discitis on the MRI.  Bilateral anterior foraminotomies performed using same.  Resected PLL.  I created central holes on both endplates for ingrowth purposes.  Epidural bleeding controlled using bipolar cautery, Surgiflo and cottonoids.  Placed large footprint (18 x 16 mm) trial spacers; elected to use  6 mm height cage.  Cage packed with bone graft; inserted into disc space.      Same procedure performed at C6-7 and C5-6, thus a total of 3 levels.  (Of note, we would later learn that we actually skipped C6-7, and that the second level we did was actually C7-T1).  At the second level, findings were still consistent with subacute infection, with granulation tissue in the epidural space.  At the third level, it was very spondylotic and  collapsed, with hardly any disc space remaining.  We tried to perform as good release as we could, including bilateral anterior foraminotomies.  C arm lateral image showed that our top cage was still quite superficial.  We tamped it deeper into the disc space.  The other 2 cages were of appropriate depth.  However, there was significant distance between the top and middle cages, suggesting that we skipped a level.  I looked back into the field and tried to palpate for a disc space in between the 2 cages, and I could not feel any soft spot.  It is likely that there was significant bridging anterior osteophyte across the C6-7 level causing us to skip this level.  I requested for the O-arm to be brought in.  3D scan obtained, which confirmed that we actually did ACDF at C5-6, C7-T1 and T1-2, while skipping C6-7.  At this point, I had to make a decision whether to try to perform ACDF at C6-7 or leave it be.  In the end, I decided to leave it as is for the following reasons: #1 we had already performed debridement of the infected level C7-T1 which was our main priority; #2 C6-7 was already almost fully ankylosed and would be very difficult to perform an ACDF on; #3 the central canal stenosis at the C6-7 level could be addressed when we perform our stage II (posterior) procedure via laminectomies.  Regarding T1-2, in retrospect it may have been a blessing in disguise that we included this level as well.  My intraoperative findings were suggestive of the infection having spread to this level as well as evidenced by significant epidural granulation tissue; thus, cleaning out this level may also have contributed to controlling the infection, even though the MRI from 2/8/2022 did not really show much T1-2 disc involvement.    We did not perform anterior plate fixation because this was a presumed infected bed.  My plan is to place posterior fixation as second stage surgery.      Irrigation.  Two deep Fr 7 round DIANELYS drains  placed.  Closed with 2-0 Vicryl for platysma, 3-0 Vicryl for subq tissue and 4-0 Monocryl for skin.  2-0 silk drain stitch placed.  Iliac crest wound was earlier closed with 0 Vicryl, 2-0 Vicryl and 3-0 Monocryl.  Skin glue and sterile dressings applied.  Anesthetic injected.  Tolerated procedure well, taken off general anesthetic, transferred to recovery in satisfactory condition.       Postop:   Admit to surgical floor.  Continue antibiotics indefinitely until culture results come back.  Internal medicine/hospitalist consult for medical comanagement.  Infectious disease consult for infection comanagement.  Orthotics consult for [place] cervical collar to be worn full-time.  Will plan on second stage surgery over the weekend (2 to 3 days from now) for posterior instrumentation and decompression.  I was also told that there are no beds available here at the [place], and that patient would likely be transferred to the [facility] ([facility]).  We will try to schedule his second stage surgery at the [facility].    I called the patient's wife, and gave her an update.  I also apprised her that technically we performed the surgery at slightly different levels than we planned, with the inadvertent skipping of C6-7 and inclusion of T1-2.  We were able to detect this towards the end of surgery, but a made a conscious decision that this was acceptable, and that really could address the C6-7 level if necessary during the second stage surgery, and that the inclusion of T1-2 may actually have been beneficial and I do not anticipate deleterious consequences from having done surgery at this level.  On Friday, 2/11/2022, I also discussed with patient in his room, and explained the same.  By that time, his neck pain had significantly improved and he was quite satisfied with the outcome of surgery.  We were still awaiting the final culture results, but infectious disease service is on board, and patient was receiving vancomycin and  cefazolin.  I explained to him that we still needed to perform second stage surgery in order to obtain adequate fixation of the operative levels and promote solid arthrodesis.  Furthermore, I would still recommend performing laminectomies at C5 and C6 to ensure adequate decompression of the spinal cord.  Patient expressed good understanding and agreement.

## 2022-02-11 NOTE — PROGRESS NOTES
General Infectious Disease Service  - [facility] [place]    Patient:  Dave Calero, Date of birth 1970, Medical record number [id number]  Date of Admission: 2/9/2022  Date of Visit:  2/11/2022         Assessment and Recommendations:   Problem List:     # Cervical discitis with epidural abscess surgical decompression/cervical diskectomy and fusion from C5-T1 + right anterior iliac crest autograft harvest on 2/9/22 - intra-operative cultures positive for Staph aureus  - Gram stain from surgical specimen with Gram positive cocci    # Blood culture (2/9/22) positive for Granulicatella and Staphylococcus aureus     Discussion:     Mr. Dave Calero is a 51 year old male with PMHx of HLD, ADHD, depression who was admitted on 2/9/22 with cervical discitis. Patient developed pain in the base of his neck and between his shoulder blades beginning on 1/3/22.  CTs of his thoracic and lumbar spine without concern at that time. However,  The pain persistent and he developed  sensation of a pill stuck in his throat and reports altered sensation to the ulnar aspects of his forearms bilaterally. Patient was subsequently seen at [facility] on 2/8/22.  MRI was obtained and results returned on 2/9/22 concerning for cervical discitis/epidural abscess (MRI not available in the system). Due to this he was referred to the [facility] emergency department on 2/9/22 for further evaluation and treatment. Patient anterior cervical diskectomy and fusion from C5-T1 as well as right anterior iliac crest autograft harvest (Dr. Calixto). There is a plan to return to the OR [facility] for posterior instrumented fusion. Intraoperative cultures positive for Staphylococcus aureus (susceptibility in progress). In addition, blood culture from 2/9/22 is positive for Granulicatella and Staphylococcus aureus (susceptibilities in progress). He is on IV vancomycin and cefazolin. Patient is afebrile and white count is  trending down.  Patient is afebrile. Repeat blood cultures from 2/10/22 are negative to date. Ordered new blood cultures today (2/11/22).      Patient denies any recent infection. He denies trauma or skin abnormality on his neck. He denies IV drug use. He denies any prior histories of spine infection or surgery. No immunosuppressive condition. No prosthetic material in his heart valves or cardiovascular devices. Granulicatella is an organism that usually colonizes the oropharynx, but also the GI and  tract. Patient denies any dental infection or dental issue and denies recent dental procedure. He denies previous urine infection or urine symptom. He denies diarrhea or GI symptom. He tells me that he had his colonoscopy which was reportedly normal. At this moment, it is unclear the origin of the infection, but the two organisms in cultures can cause endocarditis, so it is important to rule out this possibility as the origin of the spinal infection.        Recommendations:     1. Please continue IV vancomycin and cefazolin at this moment     2. Antibiotics will be adjusted once susceptibilities are back. I anticipate at least 8 weeks of antibiotics followed by chronic suppression     3. Once Staphylococcus susceptibilities are back we can decide if rifampin or rifabutin can be added for biofilm action    4. Given growth of Staphylococcus aureus and Granulicatella, I recommend PREM to rule out endocarditis. TTE can be performed in the meantime, however if it is negative a PREM will still be indicated     5. Appreciate continued ortho follow up        Recommendations discussed with medicine       The General ID team will continue to follow this patient. Please feel free to call with any questions. Dr. Phipps will be covering the ID service over the weekend and Dr. Way with be covering the ID service next week starting on 2/14/22.     Ashley Goel MD  Date of Service: 02/11/22  Pager: [phone]         Physical Exam:  "  /80 (BP Location: Right arm, Patient Position: Fowlers)   Pulse 106   Temp 96.8  F (36  C) (Oral)   Resp 18   Ht 1.753 m (5' 9\")   Wt 78.9 kg (174 lb)   SpO2 94%   BMI 25.70 kg/m         Exam:  GENERAL:  Not in acute distress.   HEAD: Normocephalic and atraumatic  ENT:  No hearing impairment, oral mucous membranes moist  EYES:  Eyes grossly normal to inspection, PERRL and conjunctivae and sclerae normal   NECK:  Neck collar in place  LUNGS:  Clear to auscultation - no rales, rhonchi or wheezes  CARDIOVASCULAR:  Regular rate and rhythm, normal S1 S2, no murmur  ABDOMEN:  Soft, nontender, no hepatosplenomegaly, no masses and bowel sounds normal  EXT: Extremities warm and without edema.  MS: No gross musculoskeletal defects noted, no edema  SKIN:  Surgical site under cervical colar  NEUROLOGIC:  Grossly nonfocal. Mentation intact and speech normal  PSYCHIATRIC: Mood stable, mentation appears normal, affect normal              Laboratory Data:     Creatinine   Date Value Ref Range Status   02/11/2022 0.69 0.66 - 1.25 mg/dL Final   02/10/2022 0.70 0.66 - 1.25 mg/dL Final   02/09/2022 0.83 0.66 - 1.25 mg/dL Final     WBC Count   Date Value Ref Range Status   02/11/2022 11.2 (H) 4.0 - 11.0 10e3/uL Final   02/09/2022 13.9 (H) 4.0 - 11.0 10e3/uL Final     Hemoglobin   Date Value Ref Range Status   02/11/2022 9.1 (L) 13.3 - 17.7 g/dL Final     Platelet Count   Date Value Ref Range Status   02/11/2022 588 (H) 150 - 450 10e3/uL Final     Lab Results   Component Value Date     02/11/2022    BUN 10 02/11/2022    CO2 28 02/11/2022     CRP Inflammation   Date Value Ref Range Status   02/09/2022 74.0 (H) 0.0 - 8.0 mg/L Final           Pertinent Recent Microbiology Data:   No results for input(s): CULT, SDES in the last 168 hours.         Imaging:     Recent Results (from the past 48 hour(s))   XR Surgery DEVORA Fluoro L/T 5 Min w Stills    Narrative    Exam: XR SURGERY DEVORA FLUORO LESS THAN 5 MIN W STILLS, " 2/9/2022 11:33  PM    Provided History: Anterior cervical discectomy and fusion, possible  anterior plate fixation C5-T1, right or left anterior iliac crest  autograft harvest  ICD-10:    Comparison: None.    Technique: Intraoperative imaging.    Findings:    A single lateral intraoperative image from 11:13 PM 2/9/2022 shows  linear surgical probe tip projecting over the anterior aspect of the  inferior endplate of C6.    Additional surgical clamp projects over the C6-7 intervertebral disc  space. Endotracheal tube is partially imaged.       Impression    Impression: Instrumentation projecting toward anterior aspect of the  inferior endplate of C6.    The above indicated surgical level was reported to operating room  personnel, specifically Dr Calixto, via telephone by Dr Andujar  on 2/9/2022 11:33 PM.    I have personally reviewed the examination and initial interpretation  and I agree with the findings.    LAUREN MEAD MD         SYSTEM ID:  [id number]   CT Cervical Spine w/o Contrast    Narrative    CT CERVICAL SPINE W/O CONTRAST 2/10/2022 12:06 PM    Provided History: Epidural abscess; s/p anterior fusion; to OR Friday  or Saturday for posterior approach    Comparison: None available    Technique: Using multidetector thin collimation helical acquisition  technique, axial, coronal and sagittal CT images through the cervical  spine were obtained without intravenous contrast.     Findings:  Postsurgical changes of anterior cervical discectomy and fusion with  interbody devices at C5-6, C7-T1 and T1-2. Associated postoperative  prevertebral soft tissue edema and emphysema. The cervical vertebrae  are normally aligned. Trace anterolisthesis of C3 on C4. Straightening  of the expected cervical lordosis. No acute fracture or subluxation.  Heterogeneous sclerotic changes of the C5 and C6 vertebral bodies.  Multilevel degenerative changes with osteophytic and uncovertebral  spurring, facet arthropathy, and  moderate disc space narrowing at C6-7  The findings on a level by level basis are as follows:    C2-3:  Bilateral uncovertebral and facet hypertrophy, left greater  than right. Mild left neuroforaminal narrowing. No right  neuroforaminal or spinal canal stenosis.    C3-4:  Disc osteophyte complex. Bilateral uncovertebral and facet  hypertrophy, left greater than right. Mild to moderate left  neuroforaminal narrowing. No right neuroforaminal or spinal canal  stenosis    C4-5:  Bilateral uncovertebral and facet hypertrophy. No spinal canal  or neural foraminal stenosis.    C5-6:  Bilateral uncovertebral and facet hypertrophy, right greater  the left. Moderate bilateral neural foraminal narrowing. No spinal  canal stenosis.    C6-7:  Disc osteophyte complex. Bilateral uncovertebral and facet  hypertrophy. Mild-to-moderate bilateral neural foraminal narrowing. No  spinal canal stenosis.    C7-T1:  No spinal canal or neural foraminal stenosis.       Impression    Impression:   1. Postsurgical changes of anterior cervical discectomy and fusion  C5-6 and C7-T2 with associated postoperative prevertebral soft tissue  edema and emphysema. Consider MRI for evaluation of soft tissue  abscess and osteomyelitis.  2. Multilevel cervical spondylosis as described above.    I have personally reviewed the examination and initial interpretation  and I agree with the findings.    HOME ROSE MD         SYSTEM ID:  [id number]   MR Cervical Spine w/o & w Contrast    Narrative    MR CERVICAL SPINE W/O & W CONTRAST 2/10/2022 3:00 PM    Provided History: Epidural abscess; s/p acdf  MR CERVICAL SPINE W/O & W CONTRAST 2/10/2022 3:00 PM    Provided History: Epidural abscess; s/p acdf    Comparison: Same day CT cervical spine    Technique: Sagittal T1-weighted, sagittal T2-weighted, sagittal  diffusion weighted, axial T2-weighted images of the cervical spine  were obtained without intravenous contrast. Following intravenous  administration of  gadolinium, axial and sagittal T1-weighted images  with fat saturation were also obtained.    Contrast: 7.5ML iv Gadavist    Findings:  Minimal anterolisthesis C2-3.    Postsurgical changes of anterior fusion with interbody devices at  C5-6, C7-T1, and T1-2.      Bone marrow edema and associated enhancement involving the C6-T2  vertebral bodies. Bone marrow edema extends into T1 and T2 posterior  elements    Extensive retropharyngeal/prevertebral soft tissue edema and  enhancement extending from C5 down to T3 level. Anterior and posterior  epidural T2 hyperintense signal with associated enhancement extending  from C5-6 down to T2-3. Edema and enhancement extends into bilateral  neural foramina from C6 to T2. Associated severe spinal canal stenosis  at C6-7 at C7-T1. Edema and associated enhancement in the interspinous  region at C7-T1 and T1-T2 levels. Moderate spinal canal stenosis C5-6.  No definite epidural fluid collection.    Multilevel disc height narrowing and disc desiccation. On a level by  level basis;    C2-3: Bilateral uncovertebral spurring and facet hypertrophy resulting  in mild bilateral neural stenosis. No spinal canal stenosis.    C3-4: Disc osteophyte complex and bilateral uncinate spurring,  eccentric to the left. Left greater than right facet hypertrophy.  Severe left neural foraminal stenosis. Mild right neural foraminal  stenosis. No spinal canal stenosis.    C4-5: Uncovertebral spurring in the right greater than left facet  hypertrophy. Mild to moderate right and mild left neural foraminal  stenosis. No spinal canal stenosis.    C5-6: Fusion level. Severe bilateral neural foraminal and moderate  spinal canal stenosis secondary to uncovertebral spurring and  superimposed epidural inflammatory findings.    C6-7: Disc osteophyte complex and bilateral uncinate spurring.  Bilateral facet hypertrophy. Epidural inflammatory/infectious findings  contribute to severe bilateral neural foraminal and  severe spinal  canal stenosis.     C7-T1: Fusion level. Epidural inflammatory/infectious findings  contribute to severe bilateral neural foraminal and severe spinal  canal stenosis      Impression    Impression:   1. Early postsurgical changes of instrumented posterior spinal fusion  with interbody devices at C5-C6, C7-T1 and T1-T2.   2. Bone marrow edema and associated enhancement involving the C6-T2  vertebrae, consistent with osteomyelitis. Epidural  phlegmon extending  from phlegmon C5-6 down to T2-3. There are several questionable tiny  fluid pockets pockets, for example in the anterior epidural space at  C6 level. However it is mostly phlegmon. Edema and enhancement extends  into bilateral neural foramina from C6 to T2. Associated severe spinal  canal stenosis at C6-7 at C7-T1 and moderate spinal canal stenosis  C5-6.  3. Extensive retropharyngeal/prevertebral soft tissue edema and  enhancement extending from C5 down to T3 level. This is combination of  postsurgical changes and inflammatory/infectious findings.     I have personally reviewed the examination and initial interpretation  and I agree with the findings.    ANDRE ESTRADA MD         SYSTEM ID:  [id number]

## 2022-02-11 NOTE — PROGRESS NOTES
[facility]  Nurse Liaison    Received referral from MARIAM Douglas for abx therapy.  Benefits verified, Pt has [employer] plan. Once deductible and OOP met, covered at 100%. Spoke with patient to introduce home infusion services, review benefits and offer choice of providers. He wishes to stay with [facility] and has chosen [facility].  Provided info on [facility] Services, Including, RN visits, RPH/RN on call 24/7, supplies, and delivery process to home. Educated on how to contact [facility].  He is in agreement with plan and willing and able to learn. He stated he will be comfortable doing infusion in home environment. He has not been to [facility], yet.  [facility] Liaison will continue to follow until DC for any changes or additional needs. This RN provided patient with [facility] phone number & brochure and will continue to follow through discharge.    DC: TBD  DC Therapies: SN, IV Abx  Delivery/Supplies: TBD  LINES/TUBES: to be placed  AGENCY: [facility]  SNV: TBD  NOTE:       Lexi Tsang RN/ [facility]-Nurse Liaison  Cell:  [phone] [facility]  [email or web address]-24 hrs  [phone]

## 2022-02-12 ENCOUNTER — APPOINTMENT (OUTPATIENT)
Dept: GENERAL RADIOLOGY | Facility: CLINIC | Age: 52
DRG: 453 | End: 2022-02-12
Attending: ORTHOPAEDIC SURGERY
Payer: COMMERCIAL

## 2022-02-12 ENCOUNTER — ANESTHESIA (OUTPATIENT)
Dept: SURGERY | Facility: CLINIC | Age: 52
DRG: 453 | End: 2022-02-12
Payer: COMMERCIAL

## 2022-02-12 PROBLEM — Z11.52 ENCOUNTER FOR SCREENING LABORATORY TESTING FOR SEVERE ACUTE RESPIRATORY SYNDROME CORONAVIRUS 2 (SARS-COV-2): Status: ACTIVE | Noted: 2022-02-12

## 2022-02-12 PROBLEM — T88.4XXA HARD TO INTUBATE: Status: ACTIVE | Noted: 2022-02-12

## 2022-02-12 PROBLEM — M47.12 CERVICAL SPONDYLOSIS WITH MYELOPATHY: Status: ACTIVE | Noted: 2022-02-12

## 2022-02-12 LAB
BACTERIA BLD CULT: ABNORMAL
BACTERIA TISS BX CULT: ABNORMAL
BACTERIA TISS BX CULT: ABNORMAL
BACTERIA WND CULT: NO GROWTH
BASE EXCESS BLDA CALC-SCNC: 6.1 MMOL/L (ref -9–1.8)
BASE EXCESS BLDA CALC-SCNC: 6.6 MMOL/L (ref -9–1.8)
CA-I BLD-MCNC: 4.4 MG/DL (ref 4.4–5.2)
CA-I BLD-MCNC: 4.7 MG/DL (ref 4.4–5.2)
GLUCOSE BLD-MCNC: 117 MG/DL (ref 70–99)
GLUCOSE BLD-MCNC: 127 MG/DL (ref 70–99)
GLUCOSE BLDC GLUCOMTR-MCNC: 105 MG/DL (ref 70–99)
HCO3 BLD-SCNC: 30 MMOL/L (ref 21–28)
HCO3 BLD-SCNC: 30 MMOL/L (ref 21–28)
HGB BLD-MCNC: 8.4 G/DL (ref 13.3–17.7)
HGB BLD-MCNC: 9.3 G/DL (ref 13.3–17.7)
HGB BLD-MCNC: 9.8 G/DL (ref 13.3–17.7)
HOLD SPECIMEN: NORMAL
LACTATE SERPL-SCNC: 0.8 MMOL/L (ref 0.7–2)
LACTATE SERPL-SCNC: 1 MMOL/L (ref 0.7–2)
O2/TOTAL GAS SETTING VFR VENT: 45 %
O2/TOTAL GAS SETTING VFR VENT: 50 %
PCO2 BLD: 38 MM HG (ref 35–45)
PCO2 BLD: 40 MM HG (ref 35–45)
PH BLD: 7.48 [PH] (ref 7.35–7.45)
PH BLD: 7.51 [PH] (ref 7.35–7.45)
PO2 BLD: 125 MM HG (ref 80–105)
PO2 BLD: 171 MM HG (ref 80–105)
POTASSIUM BLD-SCNC: 3.8 MMOL/L (ref 3.4–5.3)
POTASSIUM BLD-SCNC: 3.8 MMOL/L (ref 3.4–5.3)
SODIUM SERPL-SCNC: 138 MMOL/L (ref 133–144)
SODIUM SERPL-SCNC: 138 MMOL/L (ref 133–144)

## 2022-02-12 PROCEDURE — 0RG6071 FUSION OF THORACIC VERTEBRAL JOINT WITH AUTOLOGOUS TISSUE SUBSTITUTE, POSTERIOR APPROACH, POSTERIOR COLUMN, OPEN APPROACH: ICD-10-PCS | Performed by: ORTHOPAEDIC SURGERY

## 2022-02-12 PROCEDURE — 258N000003 HC RX IP 258 OP 636: Performed by: STUDENT IN AN ORGANIZED HEALTH CARE EDUCATION/TRAINING PROGRAM

## 2022-02-12 PROCEDURE — 22614 ARTHRD PST TQ 1NTRSPC EA ADD: CPT | Mod: 58 | Performed by: ORTHOPAEDIC SURGERY

## 2022-02-12 PROCEDURE — 999N000015 HC STATISTIC ARTERIAL MONITORING DAILY

## 2022-02-12 PROCEDURE — 250N000011 HC RX IP 250 OP 636: Performed by: STUDENT IN AN ORGANIZED HEALTH CARE EDUCATION/TRAINING PROGRAM

## 2022-02-12 PROCEDURE — 93005 ELECTROCARDIOGRAM TRACING: CPT

## 2022-02-12 PROCEDURE — 83605 ASSAY OF LACTIC ACID: CPT | Performed by: ORTHOPAEDIC SURGERY

## 2022-02-12 PROCEDURE — 22210 INCIS 1 VERTEBRAL SEG CERV: CPT | Mod: 58 | Performed by: ORTHOPAEDIC SURGERY

## 2022-02-12 PROCEDURE — 710N000010 HC RECOVERY PHASE 1, LEVEL 2, PER MIN: Performed by: ORTHOPAEDIC SURGERY

## 2022-02-12 PROCEDURE — 63048 LAM FACETEC &FORAMOT EA ADDL: CPT | Mod: 58 | Performed by: ORTHOPAEDIC SURGERY

## 2022-02-12 PROCEDURE — 82803 BLOOD GASES ANY COMBINATION: CPT | Performed by: ORTHOPAEDIC SURGERY

## 2022-02-12 PROCEDURE — 0PS30ZZ REPOSITION CERVICAL VERTEBRA, OPEN APPROACH: ICD-10-PCS | Performed by: ORTHOPAEDIC SURGERY

## 2022-02-12 PROCEDURE — 20936 SP BONE AGRFT LOCAL ADD-ON: CPT | Mod: 58 | Performed by: ORTHOPAEDIC SURGERY

## 2022-02-12 PROCEDURE — 250N000011 HC RX IP 250 OP 636: Performed by: ORTHOPAEDIC SURGERY

## 2022-02-12 PROCEDURE — 85018 HEMOGLOBIN: CPT | Performed by: INTERNAL MEDICINE

## 2022-02-12 PROCEDURE — 0RG4071 FUSION OF CERVICOTHORACIC VERTEBRAL JOINT WITH AUTOLOGOUS TISSUE SUBSTITUTE, POSTERIOR APPROACH, POSTERIOR COLUMN, OPEN APPROACH: ICD-10-PCS | Performed by: ORTHOPAEDIC SURGERY

## 2022-02-12 PROCEDURE — 63045 LAM FACETEC & FORAMOT CRV: CPT | Mod: 58 | Performed by: ORTHOPAEDIC SURGERY

## 2022-02-12 PROCEDURE — 250N000009 HC RX 250: Performed by: STUDENT IN AN ORGANIZED HEALTH CARE EDUCATION/TRAINING PROGRAM

## 2022-02-12 PROCEDURE — 82330 ASSAY OF CALCIUM: CPT | Performed by: ORTHOPAEDIC SURGERY

## 2022-02-12 PROCEDURE — C1762 CONN TISS, HUMAN(INC FASCIA): HCPCS | Performed by: ORTHOPAEDIC SURGERY

## 2022-02-12 PROCEDURE — P9041 ALBUMIN (HUMAN),5%, 50ML: HCPCS | Performed by: NURSE ANESTHETIST, CERTIFIED REGISTERED

## 2022-02-12 PROCEDURE — 999N000179 XR SURGERY CARM FLUORO LESS THAN 5 MIN W STILLS: Mod: TC

## 2022-02-12 PROCEDURE — 250N000013 HC RX MED GY IP 250 OP 250 PS 637: Performed by: STUDENT IN AN ORGANIZED HEALTH CARE EDUCATION/TRAINING PROGRAM

## 2022-02-12 PROCEDURE — 360N000085 HC SURGERY LEVEL 5 W/ FLUORO, PER MIN: Performed by: ORTHOPAEDIC SURGERY

## 2022-02-12 PROCEDURE — 20930 SP BONE ALGRFT MORSEL ADD-ON: CPT | Mod: 58 | Performed by: ORTHOPAEDIC SURGERY

## 2022-02-12 PROCEDURE — 258N000003 HC RX IP 258 OP 636

## 2022-02-12 PROCEDURE — 93010 ELECTROCARDIOGRAM REPORT: CPT | Performed by: INTERNAL MEDICINE

## 2022-02-12 PROCEDURE — 258N000003 HC RX IP 258 OP 636: Performed by: PHYSICIAN ASSISTANT

## 2022-02-12 PROCEDURE — 4A11X4G MONITORING OF PERIPHERAL NERVOUS ELECTRICAL ACTIVITY, INTRAOPERATIVE, EXTERNAL APPROACH: ICD-10-PCS | Performed by: ORTHOPAEDIC SURGERY

## 2022-02-12 PROCEDURE — 250N000009 HC RX 250: Performed by: NURSE ANESTHETIST, CERTIFIED REGISTERED

## 2022-02-12 PROCEDURE — 250N000013 HC RX MED GY IP 250 OP 250 PS 637: Performed by: PHYSICIAN ASSISTANT

## 2022-02-12 PROCEDURE — 258N000003 HC RX IP 258 OP 636: Performed by: ORTHOPAEDIC SURGERY

## 2022-02-12 PROCEDURE — 258N000003 HC RX IP 258 OP 636: Performed by: NURSE ANESTHETIST, CERTIFIED REGISTERED

## 2022-02-12 PROCEDURE — 120N000002 HC R&B MED SURG/OB UMMC

## 2022-02-12 PROCEDURE — C1713 ANCHOR/SCREW BN/BN,TIS/BN: HCPCS | Performed by: ORTHOPAEDIC SURGERY

## 2022-02-12 PROCEDURE — 250N000011 HC RX IP 250 OP 636

## 2022-02-12 PROCEDURE — 272N000001 HC OR GENERAL SUPPLY STERILE: Performed by: ORTHOPAEDIC SURGERY

## 2022-02-12 PROCEDURE — 999N000141 HC STATISTIC PRE-PROCEDURE NURSING ASSESSMENT: Performed by: ORTHOPAEDIC SURGERY

## 2022-02-12 PROCEDURE — 61783 SCAN PROC SPINAL: CPT | Mod: 58 | Performed by: ORTHOPAEDIC SURGERY

## 2022-02-12 PROCEDURE — 250N000011 HC RX IP 250 OP 636: Performed by: NURSE ANESTHETIST, CERTIFIED REGISTERED

## 2022-02-12 PROCEDURE — 00NW0ZZ RELEASE CERVICAL SPINAL CORD, OPEN APPROACH: ICD-10-PCS | Performed by: ORTHOPAEDIC SURGERY

## 2022-02-12 PROCEDURE — 258N000003 HC RX IP 258 OP 636: Performed by: INTERNAL MEDICINE

## 2022-02-12 PROCEDURE — 22842 INSERT SPINE FIXATION DEVICE: CPT | Mod: 58 | Performed by: ORTHOPAEDIC SURGERY

## 2022-02-12 PROCEDURE — 36415 COLL VENOUS BLD VENIPUNCTURE: CPT | Performed by: INTERNAL MEDICINE

## 2022-02-12 PROCEDURE — 370N000017 HC ANESTHESIA TECHNICAL FEE, PER MIN: Performed by: ORTHOPAEDIC SURGERY

## 2022-02-12 PROCEDURE — 22600 ARTHRD PST TQ 1NTRSPC CRV: CPT | Mod: 58 | Performed by: ORTHOPAEDIC SURGERY

## 2022-02-12 PROCEDURE — 999N000157 HC STATISTIC RCP TIME EA 10 MIN

## 2022-02-12 PROCEDURE — 01N10ZZ RELEASE CERVICAL NERVE, OPEN APPROACH: ICD-10-PCS | Performed by: ORTHOPAEDIC SURGERY

## 2022-02-12 PROCEDURE — 0RG2071 FUSION OF 2 OR MORE CERVICAL VERTEBRAL JOINTS WITH AUTOLOGOUS TISSUE SUBSTITUTE, POSTERIOR APPROACH, POSTERIOR COLUMN, OPEN APPROACH: ICD-10-PCS | Performed by: ORTHOPAEDIC SURGERY

## 2022-02-12 PROCEDURE — 250N000025 HC SEVOFLURANE, PER MIN: Performed by: ORTHOPAEDIC SURGERY

## 2022-02-12 PROCEDURE — 250N000011 HC RX IP 250 OP 636: Performed by: INTERNAL MEDICINE

## 2022-02-12 PROCEDURE — 8E0WXBZ COMPUTER ASSISTED PROCEDURE OF TRUNK REGION: ICD-10-PCS | Performed by: ORTHOPAEDIC SURGERY

## 2022-02-12 PROCEDURE — 250N000009 HC RX 250: Performed by: ORTHOPAEDIC SURGERY

## 2022-02-12 PROCEDURE — 272N000004 HC RX 272: Performed by: ORTHOPAEDIC SURGERY

## 2022-02-12 DEVICE — IMPLANTABLE DEVICE: Type: IMPLANTABLE DEVICE | Site: SPINE CERVICAL | Status: FUNCTIONAL

## 2022-02-12 DEVICE — TRANSITION MAS G3605535 5.5 X 35MM
Type: IMPLANTABLE DEVICE | Site: SPINE CERVICAL | Status: FUNCTIONAL
Brand: INFINITY™ OCCIPITOCERVICAL UPPER THORACIC SYSTEM

## 2022-02-12 DEVICE — IMP SET SCREW MEDTRONIC INFINITY 3600215: Type: IMPLANTABLE DEVICE | Site: SPINE CERVICAL | Status: FUNCTIONAL

## 2022-02-12 DEVICE — GRAFT BONE CRUSH CANC 30ML 400080: Type: IMPLANTABLE DEVICE | Site: SPINE CERVICAL | Status: FUNCTIONAL

## 2022-02-12 RX ORDER — LIDOCAINE HYDROCHLORIDE ANHYDROUS AND DEXTROSE MONOHYDRATE .8; 5 G/100ML; G/100ML
1 INJECTION, SOLUTION INTRAVENOUS CONTINUOUS
Status: DISCONTINUED | OUTPATIENT
Start: 2022-02-12 | End: 2022-02-14

## 2022-02-12 RX ORDER — FENTANYL CITRATE 50 UG/ML
25 INJECTION, SOLUTION INTRAMUSCULAR; INTRAVENOUS
Status: DISCONTINUED | OUTPATIENT
Start: 2022-02-12 | End: 2022-02-12 | Stop reason: HOSPADM

## 2022-02-12 RX ORDER — OXYCODONE HYDROCHLORIDE 5 MG/1
5 TABLET ORAL
Status: DISCONTINUED | OUTPATIENT
Start: 2022-02-12 | End: 2022-02-16 | Stop reason: HOSPADM

## 2022-02-12 RX ORDER — MAGNESIUM SULFATE HEPTAHYDRATE 40 MG/ML
2 INJECTION, SOLUTION INTRAVENOUS ONCE
Status: COMPLETED | OUTPATIENT
Start: 2022-02-12 | End: 2022-02-12

## 2022-02-12 RX ORDER — LIDOCAINE HYDROCHLORIDE ANHYDROUS AND DEXTROSE MONOHYDRATE .8; 5 G/100ML; G/100ML
1 INJECTION, SOLUTION INTRAVENOUS CONTINUOUS
Status: DISCONTINUED | OUTPATIENT
Start: 2022-02-12 | End: 2022-02-12 | Stop reason: HOSPADM

## 2022-02-12 RX ORDER — CALCIUM CHLORIDE 100 MG/ML
INJECTION INTRAVENOUS; INTRAVENTRICULAR PRN
Status: DISCONTINUED | OUTPATIENT
Start: 2022-02-12 | End: 2022-02-12

## 2022-02-12 RX ORDER — SODIUM CHLORIDE, SODIUM LACTATE, POTASSIUM CHLORIDE, CALCIUM CHLORIDE 600; 310; 30; 20 MG/100ML; MG/100ML; MG/100ML; MG/100ML
INJECTION, SOLUTION INTRAVENOUS CONTINUOUS PRN
Status: DISCONTINUED | OUTPATIENT
Start: 2022-02-12 | End: 2022-02-12

## 2022-02-12 RX ORDER — DIAZEPAM 5 MG
5 TABLET ORAL EVERY 6 HOURS PRN
Status: DISCONTINUED | OUTPATIENT
Start: 2022-02-12 | End: 2022-02-16 | Stop reason: HOSPADM

## 2022-02-12 RX ORDER — SODIUM CHLORIDE, SODIUM LACTATE, POTASSIUM CHLORIDE, CALCIUM CHLORIDE 600; 310; 30; 20 MG/100ML; MG/100ML; MG/100ML; MG/100ML
INJECTION, SOLUTION INTRAVENOUS CONTINUOUS
Status: DISCONTINUED | OUTPATIENT
Start: 2022-02-12 | End: 2022-02-12 | Stop reason: HOSPADM

## 2022-02-12 RX ORDER — FENTANYL CITRATE 50 UG/ML
INJECTION, SOLUTION INTRAMUSCULAR; INTRAVENOUS PRN
Status: DISCONTINUED | OUTPATIENT
Start: 2022-02-12 | End: 2022-02-12

## 2022-02-12 RX ORDER — HYDROMORPHONE HYDROCHLORIDE 1 MG/ML
0.3 INJECTION, SOLUTION INTRAMUSCULAR; INTRAVENOUS; SUBCUTANEOUS EVERY 5 MIN PRN
Status: DISCONTINUED | OUTPATIENT
Start: 2022-02-12 | End: 2022-02-12 | Stop reason: HOSPADM

## 2022-02-12 RX ORDER — LORAZEPAM 0.5 MG/1
1 TABLET ORAL EVERY 4 HOURS PRN
Status: DISCONTINUED | OUTPATIENT
Start: 2022-02-12 | End: 2022-02-12

## 2022-02-12 RX ORDER — PROPOFOL 10 MG/ML
INJECTION, EMULSION INTRAVENOUS PRN
Status: DISCONTINUED | OUTPATIENT
Start: 2022-02-12 | End: 2022-02-12

## 2022-02-12 RX ORDER — ALBUMIN, HUMAN INJ 5% 5 %
SOLUTION INTRAVENOUS CONTINUOUS PRN
Status: DISCONTINUED | OUTPATIENT
Start: 2022-02-12 | End: 2022-02-12

## 2022-02-12 RX ORDER — VANCOMYCIN HYDROCHLORIDE 1 G/20ML
INJECTION, POWDER, LYOPHILIZED, FOR SOLUTION INTRAVENOUS PRN
Status: DISCONTINUED | OUTPATIENT
Start: 2022-02-12 | End: 2022-02-12 | Stop reason: HOSPADM

## 2022-02-12 RX ORDER — OXYCODONE HYDROCHLORIDE 5 MG/1
5 TABLET ORAL EVERY 4 HOURS PRN
Status: DISCONTINUED | OUTPATIENT
Start: 2022-02-12 | End: 2022-02-12 | Stop reason: HOSPADM

## 2022-02-12 RX ORDER — LIDOCAINE 40 MG/G
CREAM TOPICAL
Status: DISCONTINUED | OUTPATIENT
Start: 2022-02-12 | End: 2022-02-16 | Stop reason: HOSPADM

## 2022-02-12 RX ORDER — ONDANSETRON 2 MG/ML
4 INJECTION INTRAMUSCULAR; INTRAVENOUS EVERY 30 MIN PRN
Status: DISCONTINUED | OUTPATIENT
Start: 2022-02-12 | End: 2022-02-12 | Stop reason: HOSPADM

## 2022-02-12 RX ORDER — CEFAZOLIN SODIUM/WATER 2 G/20 ML
2 SYRINGE (ML) INTRAVENOUS
Status: COMPLETED | OUTPATIENT
Start: 2022-02-12 | End: 2022-02-12

## 2022-02-12 RX ORDER — ACETAMINOPHEN 325 MG/1
975 TABLET ORAL EVERY 6 HOURS PRN
Status: DISCONTINUED | OUTPATIENT
Start: 2022-02-12 | End: 2022-02-12 | Stop reason: HOSPADM

## 2022-02-12 RX ORDER — LORAZEPAM 0.5 MG/1
1 TABLET ORAL ONCE
Status: COMPLETED | OUTPATIENT
Start: 2022-02-12 | End: 2022-02-12

## 2022-02-12 RX ORDER — ONDANSETRON 4 MG/1
4 TABLET, ORALLY DISINTEGRATING ORAL EVERY 30 MIN PRN
Status: DISCONTINUED | OUTPATIENT
Start: 2022-02-12 | End: 2022-02-12 | Stop reason: HOSPADM

## 2022-02-12 RX ORDER — ONDANSETRON 2 MG/ML
INJECTION INTRAMUSCULAR; INTRAVENOUS PRN
Status: DISCONTINUED | OUTPATIENT
Start: 2022-02-12 | End: 2022-02-12

## 2022-02-12 RX ORDER — ACETAMINOPHEN 325 MG/1
650 TABLET ORAL EVERY 4 HOURS PRN
Status: DISCONTINUED | OUTPATIENT
Start: 2022-02-12 | End: 2022-02-13

## 2022-02-12 RX ORDER — EPHEDRINE SULFATE 50 MG/ML
INJECTION, SOLUTION INTRAMUSCULAR; INTRAVENOUS; SUBCUTANEOUS PRN
Status: DISCONTINUED | OUTPATIENT
Start: 2022-02-12 | End: 2022-02-12

## 2022-02-12 RX ORDER — FENTANYL CITRATE 50 UG/ML
25 INJECTION, SOLUTION INTRAMUSCULAR; INTRAVENOUS EVERY 5 MIN PRN
Status: DISCONTINUED | OUTPATIENT
Start: 2022-02-12 | End: 2022-02-12 | Stop reason: HOSPADM

## 2022-02-12 RX ORDER — SODIUM CHLORIDE, SODIUM GLUCONATE, SODIUM ACETATE, POTASSIUM CHLORIDE AND MAGNESIUM CHLORIDE 526; 502; 368; 37; 30 MG/100ML; MG/100ML; MG/100ML; MG/100ML; MG/100ML
INJECTION, SOLUTION INTRAVENOUS CONTINUOUS PRN
Status: DISCONTINUED | OUTPATIENT
Start: 2022-02-12 | End: 2022-02-12

## 2022-02-12 RX ORDER — OXYCODONE HYDROCHLORIDE 10 MG/1
10 TABLET ORAL
Status: DISCONTINUED | OUTPATIENT
Start: 2022-02-12 | End: 2022-02-16 | Stop reason: HOSPADM

## 2022-02-12 RX ORDER — DEXMEDETOMIDINE HYDROCHLORIDE 4 UG/ML
INJECTION, SOLUTION INTRAVENOUS PRN
Status: DISCONTINUED | OUTPATIENT
Start: 2022-02-12 | End: 2022-02-12

## 2022-02-12 RX ORDER — LIDOCAINE HYDROCHLORIDE 20 MG/ML
INJECTION, SOLUTION INFILTRATION; PERINEURAL PRN
Status: DISCONTINUED | OUTPATIENT
Start: 2022-02-12 | End: 2022-02-12

## 2022-02-12 RX ADMIN — HYDROMORPHONE HYDROCHLORIDE 0.4 MG: 0.2 INJECTION, SOLUTION INTRAMUSCULAR; INTRAVENOUS; SUBCUTANEOUS at 22:04

## 2022-02-12 RX ADMIN — HYDROMORPHONE HYDROCHLORIDE 0.3 MG: 1 INJECTION, SOLUTION INTRAMUSCULAR; INTRAVENOUS; SUBCUTANEOUS at 14:19

## 2022-02-12 RX ADMIN — FENTANYL CITRATE 25 MCG: 50 INJECTION, SOLUTION INTRAMUSCULAR; INTRAVENOUS at 13:06

## 2022-02-12 RX ADMIN — FENTANYL CITRATE 25 MCG: 50 INJECTION, SOLUTION INTRAMUSCULAR; INTRAVENOUS at 15:11

## 2022-02-12 RX ADMIN — PHENYLEPHRINE HYDROCHLORIDE 100 MCG: 10 INJECTION INTRAVENOUS at 11:25

## 2022-02-12 RX ADMIN — PROPOFOL 50 MG: 10 INJECTION, EMULSION INTRAVENOUS at 09:02

## 2022-02-12 RX ADMIN — Medication 5 MG: at 11:25

## 2022-02-12 RX ADMIN — FENTANYL CITRATE 25 MCG: 50 INJECTION, SOLUTION INTRAMUSCULAR; INTRAVENOUS at 14:52

## 2022-02-12 RX ADMIN — FAMOTIDINE 20 MG: 20 TABLET, FILM COATED ORAL at 20:29

## 2022-02-12 RX ADMIN — PHENYLEPHRINE HYDROCHLORIDE 100 MCG: 10 INJECTION INTRAVENOUS at 08:47

## 2022-02-12 RX ADMIN — FENTANYL CITRATE 25 MCG: 50 INJECTION, SOLUTION INTRAMUSCULAR; INTRAVENOUS at 14:47

## 2022-02-12 RX ADMIN — Medication 2 G: at 01:08

## 2022-02-12 RX ADMIN — Medication 5 MG: at 08:56

## 2022-02-12 RX ADMIN — Medication 1 MG/KG/HR: at 08:26

## 2022-02-12 RX ADMIN — MAGNESIUM SULFATE HEPTAHYDRATE 2 G: 40 INJECTION, SOLUTION INTRAVENOUS at 09:28

## 2022-02-12 RX ADMIN — PHENYLEPHRINE HYDROCHLORIDE 100 MCG: 10 INJECTION INTRAVENOUS at 11:12

## 2022-02-12 RX ADMIN — SODIUM CHLORIDE, SODIUM GLUCONATE, SODIUM ACETATE, POTASSIUM CHLORIDE AND MAGNESIUM CHLORIDE: 526; 502; 368; 37; 30 INJECTION, SOLUTION INTRAVENOUS at 09:02

## 2022-02-12 RX ADMIN — KETAMINE HYDROCHLORIDE 10 MG/HR: 100 INJECTION, SOLUTION, CONCENTRATE INTRAMUSCULAR; INTRAVENOUS at 08:30

## 2022-02-12 RX ADMIN — OXYCODONE HYDROCHLORIDE 5 MG: 5 TABLET ORAL at 20:00

## 2022-02-12 RX ADMIN — PHENYLEPHRINE HYDROCHLORIDE 100 MCG: 10 INJECTION INTRAVENOUS at 10:35

## 2022-02-12 RX ADMIN — PHENYLEPHRINE HYDROCHLORIDE 100 MCG: 10 INJECTION INTRAVENOUS at 08:43

## 2022-02-12 RX ADMIN — LIDOCAINE HYDROCHLORIDE 1 MG/KG/HR: 8 INJECTION, SOLUTION INTRAVENOUS at 18:15

## 2022-02-12 RX ADMIN — Medication 5 MG: at 09:51

## 2022-02-12 RX ADMIN — PHENYLEPHRINE HYDROCHLORIDE 0.4 MCG/KG/MIN: 10 INJECTION INTRAVENOUS at 08:55

## 2022-02-12 RX ADMIN — Medication 80 MG: at 08:26

## 2022-02-12 RX ADMIN — LORAZEPAM 1 MG: 0.5 TABLET ORAL at 15:09

## 2022-02-12 RX ADMIN — PROPOFOL 140 MG: 10 INJECTION, EMULSION INTRAVENOUS at 08:26

## 2022-02-12 RX ADMIN — Medication 2 G: at 12:45

## 2022-02-12 RX ADMIN — OXYCODONE HYDROCHLORIDE 10 MG: 10 TABLET ORAL at 05:21

## 2022-02-12 RX ADMIN — HYDROMORPHONE HYDROCHLORIDE 0.3 MG: 1 INJECTION, SOLUTION INTRAMUSCULAR; INTRAVENOUS; SUBCUTANEOUS at 14:25

## 2022-02-12 RX ADMIN — OXYCODONE HYDROCHLORIDE 5 MG: 5 TABLET ORAL at 13:58

## 2022-02-12 RX ADMIN — PROPOFOL 50 MG: 10 INJECTION, EMULSION INTRAVENOUS at 08:38

## 2022-02-12 RX ADMIN — HYDROMORPHONE HYDROCHLORIDE 0.3 MG: 1 INJECTION, SOLUTION INTRAMUSCULAR; INTRAVENOUS; SUBCUTANEOUS at 13:26

## 2022-02-12 RX ADMIN — HYDROMORPHONE HYDROCHLORIDE 0.3 MG: 1 INJECTION, SOLUTION INTRAMUSCULAR; INTRAVENOUS; SUBCUTANEOUS at 15:17

## 2022-02-12 RX ADMIN — ALBUMIN HUMAN: 0.05 INJECTION, SOLUTION INTRAVENOUS at 11:36

## 2022-02-12 RX ADMIN — METHOCARBAMOL 750 MG: 750 TABLET ORAL at 01:08

## 2022-02-12 RX ADMIN — FENTANYL CITRATE 25 MCG: 50 INJECTION, SOLUTION INTRAMUSCULAR; INTRAVENOUS at 14:07

## 2022-02-12 RX ADMIN — CALCIUM CHLORIDE 1 G: 100 INJECTION, SOLUTION INTRAVENOUS at 12:10

## 2022-02-12 RX ADMIN — SUFENTANIL CITRATE 0.2 MCG/KG/HR: 50 INJECTION EPIDURAL; INTRAVENOUS at 08:30

## 2022-02-12 RX ADMIN — HYDROMORPHONE HYDROCHLORIDE 0.2 MG: 1 INJECTION, SOLUTION INTRAMUSCULAR; INTRAVENOUS; SUBCUTANEOUS at 13:35

## 2022-02-12 RX ADMIN — ACETAMINOPHEN 975 MG: 325 TABLET, FILM COATED ORAL at 00:04

## 2022-02-12 RX ADMIN — Medication 10 MCG: at 12:58

## 2022-02-12 RX ADMIN — OXYCODONE HYDROCHLORIDE 10 MG: 10 TABLET ORAL at 00:04

## 2022-02-12 RX ADMIN — PHENYLEPHRINE HYDROCHLORIDE 100 MCG: 10 INJECTION INTRAVENOUS at 08:58

## 2022-02-12 RX ADMIN — PHENYLEPHRINE HYDROCHLORIDE 100 MCG: 10 INJECTION INTRAVENOUS at 08:36

## 2022-02-12 RX ADMIN — FENTANYL CITRATE 75 MCG: 50 INJECTION, SOLUTION INTRAMUSCULAR; INTRAVENOUS at 08:26

## 2022-02-12 RX ADMIN — PHENYLEPHRINE HYDROCHLORIDE 100 MCG: 10 INJECTION INTRAVENOUS at 11:16

## 2022-02-12 RX ADMIN — HYDROMORPHONE HYDROCHLORIDE 0.3 MG: 1 INJECTION, SOLUTION INTRAMUSCULAR; INTRAVENOUS; SUBCUTANEOUS at 15:24

## 2022-02-12 RX ADMIN — PHENYLEPHRINE HYDROCHLORIDE 100 MCG: 10 INJECTION INTRAVENOUS at 11:32

## 2022-02-12 RX ADMIN — PHENYLEPHRINE HYDROCHLORIDE 100 MCG: 10 INJECTION INTRAVENOUS at 10:34

## 2022-02-12 RX ADMIN — HYDROMORPHONE HYDROCHLORIDE 0.4 MG: 0.2 INJECTION, SOLUTION INTRAMUSCULAR; INTRAVENOUS; SUBCUTANEOUS at 18:27

## 2022-02-12 RX ADMIN — ACETAMINOPHEN 975 MG: 325 TABLET, FILM COATED ORAL at 14:00

## 2022-02-12 RX ADMIN — METHOCARBAMOL 750 MG: 750 TABLET ORAL at 19:14

## 2022-02-12 RX ADMIN — OXYCODONE HYDROCHLORIDE 5 MG: 5 TABLET ORAL at 19:23

## 2022-02-12 RX ADMIN — PHENYLEPHRINE HYDROCHLORIDE 100 MCG: 10 INJECTION INTRAVENOUS at 08:40

## 2022-02-12 RX ADMIN — OXYCODONE HYDROCHLORIDE 10 MG: 10 TABLET ORAL at 22:57

## 2022-02-12 RX ADMIN — SUGAMMADEX 150 MG: 100 INJECTION, SOLUTION INTRAVENOUS at 10:06

## 2022-02-12 RX ADMIN — PROPOFOL 125 MCG/KG/MIN: 10 INJECTION, EMULSION INTRAVENOUS at 11:41

## 2022-02-12 RX ADMIN — PHENYLEPHRINE HYDROCHLORIDE 100 MCG: 10 INJECTION INTRAVENOUS at 11:00

## 2022-02-12 RX ADMIN — PHENYLEPHRINE HYDROCHLORIDE 100 MCG: 10 INJECTION INTRAVENOUS at 09:51

## 2022-02-12 RX ADMIN — GABAPENTIN 300 MG: 300 CAPSULE ORAL at 20:29

## 2022-02-12 RX ADMIN — PHENYLEPHRINE HYDROCHLORIDE 100 MCG: 10 INJECTION INTRAVENOUS at 08:56

## 2022-02-12 RX ADMIN — TRANEXAMIC ACID 10 MG/KG/HR: 100 INJECTION INTRAVENOUS at 10:24

## 2022-02-12 RX ADMIN — PHENYLEPHRINE HYDROCHLORIDE 100 MCG: 10 INJECTION INTRAVENOUS at 08:45

## 2022-02-12 RX ADMIN — MIDAZOLAM 2 MG: 1 INJECTION INTRAMUSCULAR; INTRAVENOUS at 08:23

## 2022-02-12 RX ADMIN — LIDOCAINE HYDROCHLORIDE 80 MG: 20 INJECTION, SOLUTION INFILTRATION; PERINEURAL at 08:26

## 2022-02-12 RX ADMIN — VANCOMYCIN HYDROCHLORIDE 1250 MG: 1 INJECTION, POWDER, LYOPHILIZED, FOR SOLUTION INTRAVENOUS at 03:47

## 2022-02-12 RX ADMIN — SODIUM CHLORIDE, POTASSIUM CHLORIDE, SODIUM LACTATE AND CALCIUM CHLORIDE: 600; 310; 30; 20 INJECTION, SOLUTION INTRAVENOUS at 08:19

## 2022-02-12 RX ADMIN — TRANEXAMIC ACID 2367 MG: 100 INJECTION INTRAVENOUS at 09:28

## 2022-02-12 RX ADMIN — HYDROMORPHONE HYDROCHLORIDE 0.3 MG: 1 INJECTION, SOLUTION INTRAMUSCULAR; INTRAVENOUS; SUBCUTANEOUS at 14:40

## 2022-02-12 RX ADMIN — SODIUM CHLORIDE, POTASSIUM CHLORIDE, SODIUM LACTATE AND CALCIUM CHLORIDE: 600; 310; 30; 20 INJECTION, SOLUTION INTRAVENOUS at 11:22

## 2022-02-12 RX ADMIN — HYDROMORPHONE HYDROCHLORIDE 0.3 MG: 1 INJECTION, SOLUTION INTRAMUSCULAR; INTRAVENOUS; SUBCUTANEOUS at 14:13

## 2022-02-12 RX ADMIN — FENTANYL CITRATE 25 MCG: 50 INJECTION, SOLUTION INTRAMUSCULAR; INTRAVENOUS at 14:01

## 2022-02-12 RX ADMIN — FENTANYL CITRATE 25 MCG: 50 INJECTION, SOLUTION INTRAMUSCULAR; INTRAVENOUS at 14:58

## 2022-02-12 RX ADMIN — ACETAMINOPHEN 650 MG: 325 TABLET, FILM COATED ORAL at 20:36

## 2022-02-12 RX ADMIN — ONDANSETRON 4 MG: 2 INJECTION INTRAMUSCULAR; INTRAVENOUS at 12:38

## 2022-02-12 RX ADMIN — PROPOFOL 20 MG: 10 INJECTION, EMULSION INTRAVENOUS at 12:52

## 2022-02-12 RX ADMIN — PHENYLEPHRINE HYDROCHLORIDE 100 MCG: 10 INJECTION INTRAVENOUS at 10:47

## 2022-02-12 RX ADMIN — PROPOFOL 200 MCG/KG/MIN: 10 INJECTION, EMULSION INTRAVENOUS at 08:30

## 2022-02-12 RX ADMIN — ROCURONIUM BROMIDE 30 MG: 50 INJECTION, SOLUTION INTRAVENOUS at 09:32

## 2022-02-12 RX ADMIN — PHENYLEPHRINE HYDROCHLORIDE 100 MCG: 10 INJECTION INTRAVENOUS at 08:38

## 2022-02-12 RX ADMIN — FENTANYL CITRATE 25 MCG: 50 INJECTION, SOLUTION INTRAMUSCULAR; INTRAVENOUS at 13:55

## 2022-02-12 RX ADMIN — SODIUM CHLORIDE, SODIUM LACTATE, POTASSIUM CHLORIDE, CALCIUM CHLORIDE: 600; 310; 30; 20 INJECTION, SOLUTION INTRAVENOUS at 08:30

## 2022-02-12 RX ADMIN — FENTANYL CITRATE 25 MCG: 50 INJECTION, SOLUTION INTRAMUSCULAR; INTRAVENOUS at 13:48

## 2022-02-12 RX ADMIN — SODIUM CHLORIDE, POTASSIUM CHLORIDE, SODIUM LACTATE AND CALCIUM CHLORIDE 1000 ML: 600; 310; 30; 20 INJECTION, SOLUTION INTRAVENOUS at 20:30

## 2022-02-12 RX ADMIN — Medication 10 MG: at 08:40

## 2022-02-12 RX ADMIN — Medication 2 G: at 08:49

## 2022-02-12 RX ADMIN — Medication 10 MCG: at 12:30

## 2022-02-12 RX ADMIN — DIAZEPAM 5 MG: 5 TABLET ORAL at 20:36

## 2022-02-12 RX ADMIN — HYDROXYZINE HYDROCHLORIDE 25 MG: 25 TABLET ORAL at 22:30

## 2022-02-12 RX ADMIN — PHENYLEPHRINE HYDROCHLORIDE 100 MCG: 10 INJECTION INTRAVENOUS at 09:50

## 2022-02-12 RX ADMIN — HYDROMORPHONE HYDROCHLORIDE 0.3 MG: 1 INJECTION, SOLUTION INTRAMUSCULAR; INTRAVENOUS; SUBCUTANEOUS at 14:30

## 2022-02-12 RX ADMIN — METHOCARBAMOL 750 MG: 750 TABLET ORAL at 13:58

## 2022-02-12 RX ADMIN — SENNOSIDES AND DOCUSATE SODIUM 1 TABLET: 50; 8.6 TABLET ORAL at 20:29

## 2022-02-12 ASSESSMENT — ACTIVITIES OF DAILY LIVING (ADL)
ADLS_ACUITY_SCORE: 5

## 2022-02-12 NOTE — PROGRESS NOTES
ID Chart Note  2/12/2022     Pending cultures reviewed. Sensitivities returned for S aureus and Granulicatella adjacens. S aureus is MSSA and sensitive to rifampin. Granulicatella is penicillin and vancomycin susceptible.     For the weekend, patient remains on appropriate therapy for both organisms. Cefazolin is a preferred therapy for MSSA. I suspect it would also provide fairly good coverage for the Granulicatella but it is not considered a preferred therapy. Therefore, we will continue vancomycin through the weekend as well.     When PREM is complete, we can decide upon definitive course of therapy. Vancomycin + rifampin would be simple but perhaps not as ideal as a beta lactam (nafcillin?) + rifampin +/- gentamicin. PREM findings will help guide how aggressive we should be.     Recommendation:   1. Continue vancomycin and cefazolin pending PREM results    Please don't hesitate to call me with any questions that come up over the weekend. Dr. Deion Way will be taking over the [facility] ID service on 2/14/22.     Aster Phipps MD  Infectious Diseases    Pager [phone]

## 2022-02-12 NOTE — OR NURSING
PACU to Inpatient Nursing Handoff    Patient Dave Calero is a 51 year old male who speaks English.   Procedure Procedure(s):  Posterior instrumented spinal fusion cervical 5 to thoracic 2, with laminectomies at Cervical 5, Cervical 6, Smithe Tucker Oseotomy Cervical 5-6, Cervical 6-7    Surgeon(s) Primary: Kulwinder Calixto MD     No Known Allergies    Isolation  [unfilled]     Past Medical History   has no past medical history on file.    Anesthesia Choice   Dermatome Level     Preop Meds Not applicable   Nerve block Not applicable   Intraop Meds dexmedetomidine (Precedex): 20 mcg total  fentanyl (Sublimaze): 100 mcg total  hydromorphone (Dilaudid): 0.5 mg total  ondansetron (Zofran): last given at 1238  Mag Sulfate 2g and Calcium Chloride 1g   Local Meds No   Antibiotics cefazolin (Ancef) - last given at 1245     Pain Patient Currently in Pain: yes   PACU meds  acetaminophen (Tylenol): 975 mg (total dose) last given at 1400   fentanyl (Sublimaze): 200 mcg (total dose) last given at 1511   hydromorphone (Dilaudid): 2.1 mg (total dose) last given at 1524   oxycodone (Roxicodone): 5 mg (total dose) last given at 1400   Methocarbamol @ 1400; Ativan 1mg @ 1509   PCA / epidural No   Capnography Respiratory Monitoring (EtCO2): 43 mmHg   Telemetry ECG Rhythm: Sinus tachycardia   Inpatient Telemetry Monitor Ordered? No        Labs Glucose Lab Results   Component Value Date     02/12/2022     02/12/2022     02/11/2022       Hgb Lab Results   Component Value Date    HGB 8.4 02/12/2022    HGB 9.8 02/12/2022       INR Lab Results   Component Value Date    INR 0.98 02/09/2022      PACU Imaging Not applicable     Wound/Incision Incision/Surgical Site 02/10/22 Posterior Neck (Active)   Incision Assessment UTV 02/12/22 1327   Sharon-Incision Assessment UTV 02/12/22 1327   Closure Sutures;Approximated;Liquid bandage 02/12/22 1205   Incision Drainage Amount None 02/12/22 1327   Drainage  Description UTV 02/12/22 0730   Dressing Intervention Clean, dry, intact 02/12/22 1327   Number of days: 2       Incision/Surgical Site 02/10/22 Right Hip (Active)   Incision Assessment UTV 02/12/22 1327   Sharon-Incision Assessment UTV 02/12/22 0730   Closure WENDY 02/12/22 0730   Incision Drainage Amount UTV 02/12/22 0730   Drainage Description UT 02/12/22 0730   Dressing Intervention Clean, dry, intact 02/12/22 1327   Number of days: 2       Incision/Surgical Site 02/12/22 Bilateral Back (Active)   Number of days: 0      CMS        Equipment ice pack and [place] J collar   Other LDA       IV Access Peripheral IV 02/11/22 Left;Posterior Hand (Active)   Site Assessment [facility] 02/12/22 1327   Line Status Infusing;Checked every 1-2 hour 02/12/22 1327   Dressing Intervention New dressing  02/11/22 0100   Phlebitis Scale 0-->no symptoms 02/12/22 1327   Infiltration Scale 0 02/12/22 1327   Number of days: 1       Peripheral IV 02/12/22 Right Upper forearm (Active)   Site Assessment [facility] 02/12/22 1327   Line Status Infusing 02/12/22 1327   Phlebitis Scale 0-->no symptoms 02/12/22 1327   Infiltration Scale 0 02/12/22 1327   Number of days: 0       Peripheral IV 02/12/22 Left Lower forearm (Active)   Site Assessment [facility] 02/12/22 1327   Line Status Saline locked 02/12/22 1327   Phlebitis Scale 0-->no symptoms 02/12/22 1327   Infiltration Scale 0 02/12/22 1327   Number of days: 0       Arterial Line 02/12/22 (Active)   Site Assessment [facility] 02/12/22 1327   Line Status Pulsatile blood flow 02/12/22 1327   Art Line Waveform Appropriate 02/12/22 1327   Art Line Interventions Leveled;Zeroed and calibrated 02/12/22 1327   Color/Movement/Sensation Capillary refill less than 3 sec 02/12/22 1327   Line Necessity Yes, meets criteria 02/12/22 1327   Dressing Type Transparent 02/12/22 1327   Dressing Status Clean, dry, intact 02/12/22 1327   Number of days: 0      Blood Products Albumin  mL   Intake/Output Date 02/12/22 0700 - 02/13/22  0659   Shift 2740-0474 7421-3680 9851-4172 24 Hour Total   INTAKE   I.V. 2100   2100   Colloid 250   250   Shift Total(mL/kg) 2350(29.78)   2350(29.78)   OUTPUT   Urine 200   200   Blood 125   125   Shift Total(mL/kg) 325(4.12)   325(4.12)   Weight (kg) 78.92 78.92 78.92 78.92      Drains / Jerry Closed/Suction Drain 1 Left Neck Bulb 7 Spanish (Active)   Site Description [facility] 02/12/22 0724   Dressing Status Normal: Clean, Dry & Intact 02/12/22 0724   Drainage Appearance Serosanguenous 02/12/22 0724   Status To bulb suction 02/12/22 0724   Output (ml) 0 ml 02/12/22 0050   Number of days: 2       Closed/Suction Drain 2 Left Neck Bulb 7 Spanish (Active)   Site Description [facility] 02/12/22 0724   Dressing Status Normal: Clean, Dry & Intact 02/12/22 0724   Drainage Appearance Serosanguenous 02/11/22 1726   Status To bulb suction 02/12/22 0724   Output (ml) 0 ml 02/12/22 0050   Number of days: 2       Closed/Suction Drain Posterior Neck Accordion 10 Spanish (Active)   Number of days: 0      Time of void PreOp Void Prior to Procedure: 0640 (02/12/22 0050)    PostOp Voided (mL):  (adequate, goes to bathroom on own.) (02/11/22 0800)  Urine Occurrence: 1 (02/11/22 2000)    Diapered? No   Bladder Scan      mL (02/11/22 0800)  water     Vitals    B/P: 122/82  T: 97.7  F (36.5  C)    Temp src: Oral  P:  Pulse: 112 (02/12/22 1515)          R: 21  O2:  SpO2: 97 %    O2 Device: None (Room air) (02/12/22 0634)    Oxygen Delivery: 1 LPM (02/10/22 0700)         Family/support present Wife Angeline   Patient belongings  In pt room   Patient transported on bed   DC meds/scripts (obs/outpt) Not applicable   Inpatient Pain Meds Released? Yes       Special needs/considerations None   Tasks needing completion None       Taya Phillips RN  ASCOM [id number]

## 2022-02-12 NOTE — ANESTHESIA PROCEDURE NOTES
Airway       Patient location during procedure: OR       Procedure Start/Stop Times: 2/12/2022 8:30 AM and 2/12/2022 8:35 AM  Staff -        Anesthesiologist:  Rubi Garcia MD       Performed By: anesthesiologistIndications and Patient Condition       Indications for airway management: jose ramon-procedural       Induction type:intravenous       Mask difficulty assessment: 0 - not attempted    Final Airway Details       Final airway type: endotracheal airway       Successful airway: ETT - single  Endotracheal Airway Details        ETT size (mm): 7.5       Cuffed: yes       Successful intubation technique: video laryngoscopy       VL Blade Size: Glidescope 4       Grade View of Cords: 3       Adjucts: stylet       Position: Center       Measured from: gums/teeth       Secured at (cm): 24       Bite block used: Soft (left and right)    Post intubation assessment        Number of attempts at approach: 2       Secured with: pink tape, cloth tape and other (comment) (tegaderm)       Ease of procedure: easy       Dentition: Intact and Unchanged    Additional Comments       Patient in hard collar. While patient awake, removed anterior part of collar. Neck position maintained through intubation with manual in line stabilization.  Patient preferred positioning w/o pillow. This had his head lower than sniffing position. Did not want to manipulate neck when he was asleep, so maintained suboptimal position, which likely contributed to Gr3 view.     First attempt CRNA, difficulty inserting glidescope with limited neck extension in stabilized position.

## 2022-02-12 NOTE — PLAN OF CARE
Pt tachycardic in 115-120's, pt denies dizziness, chest pain/ palpitations, SOB. Pt has adequate amounts of urine. Pt reports some double vision that occurs intermittently today but no other symptoms. PERRLA intact. Text page sent to medicine on call Jasmyne Guaman informing of double vision occurrence and high HR.     Pt pain managed today with PRN oxy 10mg and IV diluadid 0.4 given x1. Pt also received robaxin x1 and scheduled gabapentin. Pt scheduled for second surgery 2/13 at 0800. Pt is NPO at midnight. Lungs clear, HR rhythm normal, had small bm today 2/11. Ind in room. Pt able to make needs known, continue POC.

## 2022-02-12 NOTE — OP NOTE
Date of Service:  2/12/2022       Surgeon:  Kulwinder Calixto MD   Assistant:   Ziggy Quintero MD, PGY 1      Preoperative Diagnosis:     1.  Discitis (Staphylococcus aureus and Granulicatella adjacens) C7-T1 and T1-2.  2.  Advanced spondylosis with stenosis C5-6 and C6-7.  3.  Cervical myelopathy.  4.  Bilateral cervical radicular pain C8/T1.  5.  Severe refractory neck and upper thoracic pain.  6.  2 days s/p Stage 1 surgery: ACDF C5-6, C7-T1 and T1-2 (2/10/22).      Postoperative Diagnosis:     Same      Procedures:   1.  Bilateral segmental posterior instrumentation C5-T2, using lateral mass screws at bilateral C5 and C6, and pedicle screws at bilateral C7, T1 and T2.  2.  Posterior spinal fusion C5-T2, using local autograft and allograft.  3.  Complete laminectomies with Armstrong-Nayak osteotomies, for complete spinal cord and nerve root decompression and segmental deformity correction C5-6 and C6-7.  4.  Application of Partida cranial tongs for operative positioning.5.  Computer navigation using O-arm and Stealth.  7.  Intraoperative spinal cord monitoring, using SSEP's, transcranial MEP's, and bilateral upper extremity EMG's (Web and Rank), for both anterior and posterior parts..    Anesthesia:  General endotracheal.   Local anesthetic:   None; patient receiving intraoperative IV lidocaine.  EBL: 100 mL (cellsaver return 0 mL).  Complications:  None apparent.   Implants / Equipment used:   1.  Medtronic Infinity screw system: C5 =3.5 x 14 mm bilateral; C6 =3.5 x 14 mm bilateral; C7 =3.5 x 24 mm bilateral; T1 =5.5 x 30 mm bilateral; T2 =5.5 x 35 mm bilateral.  3.5 x 80 mm titanium rods x2.  3.  TXA high dose 30/10.  4.  Vancomycin powder 1 gm deep.  5.  Crushed cancellous allograft 30 mL.    Indications:  51 year old male with discitis C7-T1, and presented with severe recalcitrant neck pain and bilaterl arm pain.  Imaging revealed discitis, spondylosis severe canal stenosis and spinal cord compression.  We  performed stage 1 surgery (Anterior procedure) 2 days ago.  Neck pain significantly improved.  He is now indicated for posterior instrumentation.  Because of residual canal and foraminal stenosis, I also recommended laminectomy/decompression.  Patient consented after thorough discussion of rationale, risks, benefits and alternatives.      Details:    Properly identified in preop area, site marked, informed consent signed.  Wheeled to OR.  Brief earlier performed.  General anesthesia administered.  Jerry inserted.  Neuro monitoring electrodes placed by [employer] technician.  Antibiotic given: Cefazolin 2 g IV; patient had also been receiving IV vancomycin.  TXA started.  Partida cranial tongs applied.  Patient turned prone on Trios table with combo pads and large hip pads.  Partida tongs attached to Levo head positioner.  Positoned neck in flexion to accommodate current deformity and facilitate exposure.  Arms wrapped in purple foam arm wraps; tucked on the sides on top of large hip pads.  Shoulders taped down.  Lower hairline clipped.  Posterior neck squared off, prepped with chlorhexidine scrub, alcohol and chloraprep and draped in sterile fashion.  Surgical timeout performed.  Baseline spinal cord signals obtained; good signals from all extremities.  Patient given relaxation for the next half hour to facilitate exposure.     Posterior midline incision made over approximate C5-T2.  Bifid spinous processes osteotomized, with tips of spinous processes left attached to paraspinal muscles.  Bilateral superiosteal exposure performed out to lateral edges of lateral masses and trasverse processes.  Used local anatomic landmarks for provisional localization.  Exposed from C5-T2.  Spinous process clamp with regino frame attached to T1.  O-arm brought in.  3D scan obtained for navigation purposes.  Confirmed spinous process clamp position at T1.  We also used presence of previously placed interbody cages at known levels for  localization.  Thus, radiologist verification not warranted.  Confirmatory timeout performed..    Posterior screws placed C5-T2, starting at T2 and proceeding cephalad using navigation.  Pedicle screws placed at T2, T1 and C7.  Starting points identified using nadir probe;  holes created using yessenia.  Track created using nadir drill with 20 mm stop.  Stealth used to select screw length and width.  Undertapped by 1 size using nadir tap, prior to inserting screw using nadir .  At C5 and C6, we elected to use lateral mass trajectories.  Starting point selected using nadir probe, ensuring to align the screw heads with the other screws to facilitate letty seating.   holes created using yessenia; tracks created using nadir drill with 14 mm stop.  Nadir 3.0 mm tap.  We did not yet place our screws, so that the screw heads will not get in the way of joint decortication, decompression and osteotomy.  Spinal cord signals stable.    I decorticated bilateral C5-6 and C6-7 facet joints using yessenia, I used a lamina  between the spinous processes to clearly identify the facet joint.  I decorticated and released the facet joint all the way ventrally.  I then performed bilateral complete laminectomies of C5 and C6.  Bilateral longitudinal gutters created along the junction of the lamina and lateral mass using yessenia.  Once completed, I pried off the laminae en bloc using a Kocher clamp, and releasing underneath using curette.  I used 2 mm Kerrison to release some tissue between C4 and C5.  I was able to completely remove the C5 and C6 lamina, thus decompressing the central canal.  Spinal cord signals stable.    I then performed bilateral Armstrong-Nayak osteotomies or complete facetectomies C5-6 and C6-7.  The main purpose was to completely decompress the exiting nerve roots bilaterally; but also to provide sufficient release for segmental lordosis restoration.  I used 2 mm Kerrison to completely unroofed bilateral foramina at both  levels, with resection of the ascending processes.  Spinal cord signals stable.      We then inserted our bilateral C5 and C6 lateral mass screws into the previously prepared tracts.  We obtained 3D check scan, which showed good placement of all screws.    I grabbed the Levo handlebar and gradually lifted the head up, to restore lordosis.  We measured letty lengths using template.  Appropriate length 3.5 mm titanium rods contoured using [place] es[place].  Rods seated; set plugs placed.  We used coronal and sagittal benders to get the rods appropriately seated.  Final AP and lateral O-arm images showed good alignment and implant placement.  Construct final tightened; no compression performed.    Posterior fusion C5-T2 performed.  I decorticated posterior elements (spinous processes, lamina, facet joints) using yessenia.  Applied bone graft (morselized local autograft and 30 mL crushed cancellous allograft) over the decorticated posterior elements.    Deep Hemovac drain placed.  Vancomycin powder 1 g deep applied.  Closure in layers: Fascia closed with Vicryl No. 1 pop offs; deep subcutaneous tissue with running 0 Vicryl; superficial subcutaneous tissue with interrupted 2-0 Vicryl; and skin with 3-0 Monocryl.  Skin glue and sterile dressings applied.  Patient tolerated procedure well.  Turned back supine.  Partida cranial tongs removed.  Taken off general anesthetic.      Postop:  Admit to surgical floor [facility] bed.  Continue IV Vancomycin and cefazolin per Inf Dse recommendations (Dr. Phipps).  Continue IV lidocaine 1mg/kg/hr until 8am POD#2.  Mechanical DVT prophylaxis.  Hospitalist medical co-management.  Continue Inf Dse co-management.  PT and OT consult.  No lifting more than 10 pounds, avoid excessive bending or twisting.  [place]-J collar to be worn full time x 6-12 weeks, may take off for hygiene, may loosen when eating or when not doing activities.  Cervical ap-lat x-rays prior to discharge.  Anticipate discharge in 3-5  days.  RTC 6 weeks with EOS full-spine AP-lat x-rays.

## 2022-02-12 NOTE — PROGRESS NOTES
"  Orthopaedic Surgery Daily Progress Note     Subjective: No acute events overnight. Spasm pain upper back. Slight diminished sensation lateral right arm, otherwise no numbness or tingling. Ready for OR    Physical Exam   /82 (BP Location: Right arm, Patient Position: Sitting)   Pulse 111   Temp 99  F (37.2  C) (Oral)   Resp 18   Ht 1.753 m (5' 9\")   Wt 78.9 kg (174 lb)   SpO2 96%   BMI 25.70 kg/m      Intake/Output Summary (Last 24 hours) at 2/11/2022 0634  Last data filed at 2/10/2022 2200  Gross per 24 hour   Intake 240 ml   Output 1035 ml   Net -795 ml     General: Alert, well-appearing in no acute distress.  Respiratory: Non-labored breathing.   Cardiovascular: Extremities warm and well perfused  Extremities: moving all four extremities.     Cervical spine:    Appearance -no gross step-offs, kyphosis.    Motor -     C5: Deltoids R 5/5 and L 5/5 strength    C6: Biceps R 5/5 and L 5/5 strength     C7: Triceps R 5/5 and L 5/5 strength     C8:  R 5/5 and L 5/5 strength     T1: Dorsal interossei R 4/5 and L 4/5 strength        Sensation: intact to light touch in C5-T1 - slightly diminished right T1      Labs    Complete Blood Count   Recent Labs   Lab 02/12/22  0540 02/11/22  0540 02/10/22  0544 02/09/22  1438   WBC  --  11.2*  --  13.9*   HGB 9.8* 9.1* 8.3* 12.1*   PLT  --  588*  --  733*     Basic Metabolic Panel  Recent Labs   Lab 02/12/22  0646 02/11/22  0540 02/10/22  0916 02/10/22  0539 02/09/22  2112 02/09/22  1438   NA  --  137  --  136  --  137   POTASSIUM  --  3.8 4.4 5.9*  --  3.4   CHLORIDE  --  104  --  108  --  102   CO2  --  28  --  26  --  30   BUN  --  10  --  9  --  12   CR  --  0.69  --  0.70  --  0.83   * 110*  --  433* 100* 130*     Coagulation Profile  Recent Labs   Lab 02/09/22  1438   INR 0.98     Assessment/Plan:  Assessment: Dave Calero is a 51 year old male s/p ACDF C5-6,C7-T1, T1-T2 on 2/9 with Dr. Calixto for epidural abscess and discitis     Return " to OR this morning with Dr Calixto     Plan:  Orthopedics Primary  Activity: Up with assist until independent. No excessive bending or twisting. No lifting >10 lbs x 6 weeks. No Scar lift for transfers.   Weight bearing status: WBAT.  Pain management: Transition from IV to PO as tolerated.    Antibiotics: ancef and vanco per ID  Diet: NPO  DVT prophylaxis: SCDs only. No chemical DVT ppx needed at discharge.  Labs: Hgb POD#1.  Bracing/Splinting: [place] J -ordered.  Dressings: Keep dressing CDI.  Drains: Document output per shift, will be discontinued at Orthopedic Surgery discretion.  Jerry catheter: None  Physical Therapy/Occupational Therapy: Eval and treat.  Cultures: Pending, follow culture results closely.    Consults: IM,ID, PT, OT, orthotics.  Follow-up: TBD  Disposition: admitted to orthopedics     Sachin Herrera MD  Orthopaedic Surgery, PGY4

## 2022-02-12 NOTE — PROGRESS NOTES
D: pt  Sent to surgery( 07:00))  before this nurse arrived. NO assessment available for this shift.( no meds/assessments/cares given)  A: await POST OP/report - return .

## 2022-02-12 NOTE — PROGRESS NOTES
Ortho Post-op Check    Subjective:  Seen in PACU.  Soreness in neck, but pain is tolerable. Able to fully participate in exam. Denies numbness or tingling in any extremity. No N/V. Doing well.       Objective:   General:  Sleeping but arousable and participates in exam  Respiratory:  NLB on RA  Musculoskeletal:  Spine:  Cervical spine:    Appearance -no gross step-offs, kyphosis.    Motor -     C5: Deltoids R 5/5 and L 5/5 strength    C6: Biceps R 5/5 and L 5/5 strength     C7: Triceps R 5/5 and L 5/5 strength     C8:  R 5/5 and L 5/5 strength     T1: Dorsal interossei R 4/5 and L 4/5 strength        Sensation: intact to light touch in C5-T1       Lumbar Spine:    Appearance - No gross stepoffs or deformities    Motor -     L2-3: Hip flexion 5/5 R and 5/5 L strength          L3/4:  Knee extension R 5/5 and L 5/5 strength         L4/5:  Foot dorsiflexion R 5/5 L 5/5 and       EHL dorsiflexion R 4/5 L 4/5 strength         S1:  Plantarflexion/Peroneal Muscles  R 5/5 and L 5/5 strength    Sensation: intact to light touch L3-S1 distribution BLE    Assessment/Plan:   51 year old male POD 0 s/p C5-T2 PSF.  Doing well.    Orthopedics Primary  Activity: Up with assist until independent. No excessive bending or twisting. No lifting >10 lbs x 6 weeks. No Scar lift for transfers.   Weight bearing status: WBAT.  Pain management: IV Lidocaine until POD2 at 8AMTransition from IV to PO as tolerated.    Antibiotics: ancef and vanco per ID  Diet: start with clears and advance as tolerated  DVT prophylaxis: SCDs only. No chemical DVT ppx needed at discharge.  Labs: Hgb POD#1.  Bracing/Splinting: [place] J in place. Can remove for cares  Dressings: Keep dressing CDI.   Drains: Document output per shift, will be discontinued at Orthopedic Surgery discretion.  Jerry catheter: remove POD#1  Physical Therapy/Occupational Therapy: Eval and treat.  Cultures: Pending, follow culture results closely.    Consults: IM,ID, PT, OT,  orthotics.  Follow-up: TBD  Disposition: Pending progress with therapies, pain control on orals, and medical stability, anticipate discharge to home on 3-5    --  Ziggy Quintero MD  Orthopedic Surgery PGY-1

## 2022-02-12 NOTE — PLAN OF CARE
"/70 (BP Location: Right arm)   Pulse 106   Temp (!) 96.5  F (35.8  C) (Oral)   Resp 18   Ht 1.753 m (5' 9\")   Wt 78.9 kg (174 lb)   SpO2 95%   BMI 25.70 kg/m      Pt is set to have surgery at 8am today 2/12. Pt has been NPO since midnight and received a CHG bath around 6 am. Pt has a rt hip graft and anterior cervical incision - CDI. Pt has 2 DIANELYS drains - minimal output. Pt has a [place] J collar - worn at all times. Pt reports N/T in rt arm - improving. Pt is IND in the room and calls appropriately for needs. LBM was 2/11. Pt has been tachy and reported double vision intermittently - evening nurse paged provider about each. Pt reported pain in back and neck 6-9/10 and received tylenol, oxycodone, and robaxin to manage pain.   "

## 2022-02-12 NOTE — BRIEF OP NOTE
Brief Operative Note    Preop Dx:   Epidural abscess [G06.2]  Discitis of cervical region [M46.42]  Cervical spondylosis with myelopathy [M47.12]  Post op Dx:   Same  Procedure:    Procedure(s):  Posterior instrumented spinal fusion cervical 5 to thoracic 2, with laminectomies at Cervical 5, Cervical 6, Smithe Tucker Oseotomy Cervical 5-6, Cervical 6-7   Surgeon:     Kulwinder Calixto MD  Assistants:    Ziggy Quintero MD   Anesthesia:   Choice  EBL:    100mL   Total IV Fluids:  See Anesthesia Record  Specimens:   None  Findings:   See Operative Dictation      Assessment and Plan: Dave Calero is a 51 year old male now s/p above procedure on 2/12/2022 with Dr. Calixto.     Orthopedics Primary  Activity: Up with assist until independent. No excessive bending or twisting. No lifting >10 lbs x 6 weeks. No Scar lift for transfers.   Weight bearing status: WBAT.  Pain management: IV Lidocaine until POD2 at 8AM. Transition from IV to PO as tolerated.    Antibiotics: ancef and vanco per ID  Diet: start with clears and advance as tolerated  DVT prophylaxis: SCDs only. No chemical DVT ppx needed at discharge.  Labs: Hgb POD#1.  Bracing/Splinting: [place] J in place. Can remove for cares  Dressings: Keep dressing CDI.   Drains: Document output per shift, will be discontinued at Orthopedic Surgery discretion.  Jerry catheter: remove POD#1  Physical Therapy/Occupational Therapy: Eval and treat.  Cultures: Pending, follow culture results closely.    Consults: IM,ID, PT, OT, orthotics.  Follow-up: TBD  Disposition: Pending progress with therapies, pain control on orals, and medical stability, anticipate discharge to home on 3-5    Ziggy Quintero MD  Orthopaedic Surgery Resident  P: [phone]    Please page me  with any questions/concerns during regular weekday hours before 4pm. If there is no response, if it is a weekend, or if it is during evening hours then please page the orthopaedic surgery resident  on call.  ID Type Source Tests Collected by Time Destination   A :  Blood, arterial Artery, Radial, Right LACTIC ACID WHOLE BLOOD, ARTERIAL PANEL Randi Jon APRN CRNA 2/12/2022  9:56 AM    B :  Blood, arterial Artery, Radial, Right LACTIC ACID WHOLE BLOOD, ARTERIAL PANEL Randi Jon APRN CRNA 2/12/2022 11:53 AM      Implants:   Implant Name Type Inv. Item Serial No.  Lot No. LRB No. Used Action   SCREW BONE INFINITY 30MM 5.5MM MA TRNS STRL  [id number] - [id number] Metallic Hardware/[place] SCREW BONE INFINITY 30MM 5.5MM MA TRNS STRL  [id number]  [employer] [id number] N/A 1 Implanted   SCREW BONE INFINITY 35MM 5.5MM MA TRNS STRL  [id number] - [id number] Metallic Hardware/[place] SCREW BONE INFINITY 35MM 5.5MM MA TRNS STRL  [id number]  [employer] [id number] N/A 1 Implanted   SCREW BONE INFINITY 35MM 5.5MM MA TRNS STRL  [id number] - [id number] Metallic Hardware/[place] SCREW BONE INFINITY 35MM 5.5MM MA TRNS STRL  [id number]  [employer] [id number] N/A 1 Implanted   SCREW BONE INFINITY 30MM 5.5MM MA TRNS STRL  [id number] - [id number] Metallic Hardware/[place] SCREW BONE INFINITY 30MM 5.5MM MA TRNS STRL  [id number]  [employer] [id number] N/A 1 Implanted   IMP SET SCREW MEDTRONIC INFINITY [id number] - [id number] Metallic Hardware/[place] IMP SET SCREW MEDTRONIC INFINITY [id number]  [employer]-DANEK  N/A 10 Implanted   Medtronic 3.5 x 24 scew    MEDTRONIC  N/A 2 Implanted   Medtronic 3.5 x 14 scew     MEDTRONIC  N/A 4 Implanted   Medtronic 80mm [place]    MEDTRONIC  N/A 2 Implanted   GRAFT BONE CRUSH CANC 30ML [id number] - [id number] Bone/Tissue/Biologic GRAFT BONE CRUSH CANC 30ML [id number] MUSCULOSKELETAL JAQUEZ  N/A 1 Implanted

## 2022-02-12 NOTE — ANESTHESIA PROCEDURE NOTES
Arterial Line Procedure Note    Pre-Procedure   Staff -        Anesthesiologist:  Rubi Garcia MD       Performed By: anesthesiologist       Location: OR       Pre-Anesthestic Checklist: patient identified, IV checked, risks and benefits discussed, informed consent, monitors and equipment checked, pre-op evaluation and at physician/surgeon's request  Timeout:       Correct Patient: Yes        Correct Procedure: Yes        Correct Site: Yes        Correct Position: Yes   Procedure   Procedure: arterial line       Diagnosis: epidural abscess       Laterality: right       Insertion Site: radial.  Sterile Prep        Standard elements of sterile barrier followed       Skin prep: Chloraprep  Insertion/Injection        Technique: Seldinger Technique and ultrasound guided        1. Ultrasound was used to evaluate the access site.       2. Artery evaluated via ultrasound for patency/adequacy.       3. Using real-time ultrasound the needle/catheter was observed entering the artery/vein.       Catheter Type/Size: 20 G, 1.75 in/4.5 cm quick cath (integral wire)  Narrative         Secured by: suture       Tegaderm dressing used.       Complications: None apparent,        Arterial waveform: Yes        IBP within 10% of NIBP: Yes

## 2022-02-12 NOTE — OR NURSING
Pt arrived to PACU rating pain 10/10 despite 100mcg fentanyl and 1.5mg dilaudid. Also gave Robaxin (on hold from floor and ok per MDA), tylenol, and 5mg oxycodone. Notified Dr. Garcia of meds given at 1445. Dr. Garcia approved going back to fentanyl and giving up to 200 mcg total. She also approved giving up to 2.1 mg dilaudid and at 1505 gave verbal order for 1mg of oral ativan. Ativan seemed to help the most as his pain decreased from a 10 to a 4 about 30 min after the ativan was given.

## 2022-02-13 ENCOUNTER — APPOINTMENT (OUTPATIENT)
Dept: PHYSICAL THERAPY | Facility: CLINIC | Age: 52
DRG: 453 | End: 2022-02-13
Payer: COMMERCIAL

## 2022-02-13 LAB
ANION GAP SERPL CALCULATED.3IONS-SCNC: 4 MMOL/L (ref 3–14)
BUN SERPL-MCNC: 7 MG/DL (ref 7–30)
CALCIUM SERPL-MCNC: 8.8 MG/DL (ref 8.5–10.1)
CHLORIDE BLD-SCNC: 104 MMOL/L (ref 94–109)
CO2 SERPL-SCNC: 28 MMOL/L (ref 20–32)
CREAT SERPL-MCNC: 0.66 MG/DL (ref 0.66–1.25)
ERYTHROCYTE [DISTWIDTH] IN BLOOD BY AUTOMATED COUNT: 12.9 % (ref 10–15)
GFR SERPL CREATININE-BSD FRML MDRD: >90 ML/MIN/1.73M2
GLUCOSE BLD-MCNC: 130 MG/DL (ref 70–99)
GLUCOSE BLD-MCNC: 130 MG/DL (ref 70–99)
HCT VFR BLD AUTO: 26.5 % (ref 40–53)
HGB BLD-MCNC: 8.2 G/DL (ref 13.3–17.7)
MCH RBC QN AUTO: 26.7 PG (ref 26.5–33)
MCHC RBC AUTO-ENTMCNC: 30.9 G/DL (ref 31.5–36.5)
MCV RBC AUTO: 86 FL (ref 78–100)
PLATELET # BLD AUTO: 527 10E3/UL (ref 150–450)
POTASSIUM BLD-SCNC: 3.9 MMOL/L (ref 3.4–5.3)
RBC # BLD AUTO: 3.07 10E6/UL (ref 4.4–5.9)
SODIUM SERPL-SCNC: 136 MMOL/L (ref 133–144)
VANCOMYCIN SERPL-MCNC: 17.3 MG/L
WBC # BLD AUTO: 11.9 10E3/UL (ref 4–11)

## 2022-02-13 PROCEDURE — 258N000003 HC RX IP 258 OP 636: Performed by: INTERNAL MEDICINE

## 2022-02-13 PROCEDURE — 82947 ASSAY GLUCOSE BLOOD QUANT: CPT | Performed by: ORTHOPAEDIC SURGERY

## 2022-02-13 PROCEDURE — 250N000011 HC RX IP 250 OP 636: Performed by: INTERNAL MEDICINE

## 2022-02-13 PROCEDURE — 85027 COMPLETE CBC AUTOMATED: CPT | Performed by: INTERNAL MEDICINE

## 2022-02-13 PROCEDURE — 250N000011 HC RX IP 250 OP 636: Performed by: STUDENT IN AN ORGANIZED HEALTH CARE EDUCATION/TRAINING PROGRAM

## 2022-02-13 PROCEDURE — 250N000013 HC RX MED GY IP 250 OP 250 PS 637: Performed by: STUDENT IN AN ORGANIZED HEALTH CARE EDUCATION/TRAINING PROGRAM

## 2022-02-13 PROCEDURE — 97530 THERAPEUTIC ACTIVITIES: CPT | Mod: GP

## 2022-02-13 PROCEDURE — 99231 SBSQ HOSP IP/OBS SF/LOW 25: CPT | Performed by: INTERNAL MEDICINE

## 2022-02-13 PROCEDURE — 82310 ASSAY OF CALCIUM: CPT | Performed by: INTERNAL MEDICINE

## 2022-02-13 PROCEDURE — 258N000003 HC RX IP 258 OP 636: Performed by: STUDENT IN AN ORGANIZED HEALTH CARE EDUCATION/TRAINING PROGRAM

## 2022-02-13 PROCEDURE — 80202 ASSAY OF VANCOMYCIN: CPT | Performed by: ORTHOPAEDIC SURGERY

## 2022-02-13 PROCEDURE — 36415 COLL VENOUS BLD VENIPUNCTURE: CPT | Performed by: ORTHOPAEDIC SURGERY

## 2022-02-13 PROCEDURE — 250N000013 HC RX MED GY IP 250 OP 250 PS 637: Performed by: PHYSICIAN ASSISTANT

## 2022-02-13 PROCEDURE — 97161 PT EVAL LOW COMPLEX 20 MIN: CPT | Mod: GP

## 2022-02-13 PROCEDURE — 120N000002 HC R&B MED SURG/OB UMMC

## 2022-02-13 PROCEDURE — 36415 COLL VENOUS BLD VENIPUNCTURE: CPT | Performed by: INTERNAL MEDICINE

## 2022-02-13 RX ORDER — SODIUM CHLORIDE 9 MG/ML
INJECTION, SOLUTION INTRAVENOUS CONTINUOUS
Status: DISPENSED | OUTPATIENT
Start: 2022-02-13 | End: 2022-02-13

## 2022-02-13 RX ORDER — GABAPENTIN 300 MG/1
300 CAPSULE ORAL 2 TIMES DAILY
Status: COMPLETED | OUTPATIENT
Start: 2022-02-13 | End: 2022-02-15

## 2022-02-13 RX ORDER — ACETAMINOPHEN 325 MG/1
975 TABLET ORAL EVERY 8 HOURS PRN
Status: DISCONTINUED | OUTPATIENT
Start: 2022-02-13 | End: 2022-02-16 | Stop reason: HOSPADM

## 2022-02-13 RX ORDER — HYDROXYZINE HYDROCHLORIDE 50 MG/1
50 TABLET, FILM COATED ORAL EVERY 6 HOURS PRN
Status: DISCONTINUED | OUTPATIENT
Start: 2022-02-13 | End: 2022-02-16 | Stop reason: HOSPADM

## 2022-02-13 RX ADMIN — OXYCODONE HYDROCHLORIDE 10 MG: 10 TABLET ORAL at 18:25

## 2022-02-13 RX ADMIN — HYDROXYZINE HYDROCHLORIDE 50 MG: 50 TABLET, FILM COATED ORAL at 12:39

## 2022-02-13 RX ADMIN — HYDROMORPHONE HYDROCHLORIDE 0.4 MG: 0.2 INJECTION, SOLUTION INTRAMUSCULAR; INTRAVENOUS; SUBCUTANEOUS at 05:25

## 2022-02-13 RX ADMIN — VANCOMYCIN HYDROCHLORIDE 1250 MG: 1 INJECTION, POWDER, LYOPHILIZED, FOR SOLUTION INTRAVENOUS at 03:46

## 2022-02-13 RX ADMIN — Medication 2 G: at 15:41

## 2022-02-13 RX ADMIN — Medication 2 G: at 00:16

## 2022-02-13 RX ADMIN — METHOCARBAMOL 750 MG: 750 TABLET ORAL at 18:25

## 2022-02-13 RX ADMIN — VANCOMYCIN HYDROCHLORIDE 1250 MG: 1 INJECTION, POWDER, LYOPHILIZED, FOR SOLUTION INTRAVENOUS at 16:45

## 2022-02-13 RX ADMIN — POLYETHYLENE GLYCOL 3350 17 G: 17 POWDER, FOR SOLUTION ORAL at 08:14

## 2022-02-13 RX ADMIN — OXYCODONE HYDROCHLORIDE 10 MG: 10 TABLET ORAL at 11:14

## 2022-02-13 RX ADMIN — Medication 2 G: at 08:14

## 2022-02-13 RX ADMIN — METHOCARBAMOL 750 MG: 750 TABLET ORAL at 12:39

## 2022-02-13 RX ADMIN — OXYCODONE HYDROCHLORIDE 10 MG: 10 TABLET ORAL at 08:14

## 2022-02-13 RX ADMIN — SENNOSIDES AND DOCUSATE SODIUM 1 TABLET: 50; 8.6 TABLET ORAL at 20:51

## 2022-02-13 RX ADMIN — OXYCODONE HYDROCHLORIDE 10 MG: 10 TABLET ORAL at 14:06

## 2022-02-13 RX ADMIN — ACETAMINOPHEN 975 MG: 325 TABLET, FILM COATED ORAL at 15:41

## 2022-02-13 RX ADMIN — ACETAMINOPHEN 650 MG: 325 TABLET, FILM COATED ORAL at 00:08

## 2022-02-13 RX ADMIN — GABAPENTIN 300 MG: 300 CAPSULE ORAL at 20:51

## 2022-02-13 RX ADMIN — HYDROMORPHONE HYDROCHLORIDE 0.4 MG: 0.2 INJECTION, SOLUTION INTRAMUSCULAR; INTRAVENOUS; SUBCUTANEOUS at 00:10

## 2022-02-13 RX ADMIN — SODIUM CHLORIDE: 9 INJECTION, SOLUTION INTRAVENOUS at 03:55

## 2022-02-13 RX ADMIN — METHOCARBAMOL 750 MG: 750 TABLET ORAL at 04:06

## 2022-02-13 RX ADMIN — FAMOTIDINE 20 MG: 20 TABLET, FILM COATED ORAL at 08:13

## 2022-02-13 RX ADMIN — SENNOSIDES AND DOCUSATE SODIUM 1 TABLET: 50; 8.6 TABLET ORAL at 08:13

## 2022-02-13 RX ADMIN — OXYCODONE HYDROCHLORIDE 10 MG: 10 TABLET ORAL at 02:44

## 2022-02-13 RX ADMIN — SODIUM CHLORIDE: 9 INJECTION, SOLUTION INTRAVENOUS at 02:43

## 2022-02-13 RX ADMIN — FAMOTIDINE 20 MG: 20 TABLET, FILM COATED ORAL at 20:51

## 2022-02-13 RX ADMIN — GABAPENTIN 300 MG: 300 CAPSULE ORAL at 11:14

## 2022-02-13 RX ADMIN — ACETAMINOPHEN 650 MG: 325 TABLET, FILM COATED ORAL at 05:31

## 2022-02-13 RX ADMIN — Medication 50 MCG: at 08:13

## 2022-02-13 RX ADMIN — HYDROXYZINE HYDROCHLORIDE 50 MG: 50 TABLET, FILM COATED ORAL at 18:25

## 2022-02-13 ASSESSMENT — ACTIVITIES OF DAILY LIVING (ADL)
ADLS_ACUITY_SCORE: 5

## 2022-02-13 NOTE — ANESTHESIA POSTPROCEDURE EVALUATION
Patient: Dave Calero    Procedure: Procedure(s):  Posterior instrumented spinal fusion cervical 5 to thoracic 2, with laminectomies at Cervical 5, Cervical 6, Smithe Tucker Oseotomy Cervical 5-6, Cervical 6-7        Diagnosis:Epidural abscess [G06.2]  Discitis of cervical region [M46.42]  Cervical spondylosis with myelopathy [M47.12]  Diagnosis Additional Information: No value filed.    Anesthesia Type:  General    Note:  Disposition: Inpatient   Postop Pain Control: Challenging            Challenges/Interventions: Exacerbation of chronic pain; Acute Pain; Multimodal therapy (muscle spasms)            Sign Out: Well controlled pain   PONV: No   Neuro/Psych: Uneventful            Sign Out: Acceptable/Baseline neuro status   Airway/Respiratory: Uneventful            Sign Out: Acceptable/Baseline resp. status   CV/Hemodynamics: Uneventful            Sign Out: Acceptable CV status; No obvious hypovolemia; No obvious fluid overload   Other NRE: NONE   DID A NON-ROUTINE EVENT OCCUR? No    Event details/Postop Comments:  No emergence delerium with allred removed pre emergence.            Last vitals:  Vitals Value Taken Time   /99 02/12/22 1800   Temp 36.6  C (97.9  F) 02/12/22 1700   Pulse 110 02/12/22 1800   Resp 18 02/12/22 1730   SpO2 92 % 02/12/22 1804   Vitals shown include unvalidated device data.    Electronically Signed By: Rubi Garcia MD  February 12, 2022  6:49 PM

## 2022-02-13 NOTE — PROGRESS NOTES
Pt was seen, events of second surgery reviewed    Pt c/o neck pain  No difficulty swallowing  No chest pain or SOB    Afebrile  BP 120s-130s/  HR 110s  02 sats upper 90s 2 L    Alert, fully oriented  In NAD  RR 12, unlabored  No stridor or wheezes  CV rrr  Abd soft, non-disteneded  No LE edema    Results for SONYA CALERO (MRN [id number]) as of 2/13/2022 10:16   Ref. Range 2/13/2022 06:34   Sodium Latest Ref Range: 133 - 144 mmol/L 136   Potassium Latest Ref Range: 3.4 - 5.3 mmol/L 3.9   Chloride Latest Ref Range: 94 - 109 mmol/L 104   Carbon Dioxide Latest Ref Range: 20 - 32 mmol/L 28   Urea Nitrogen Latest Ref Range: 7 - 30 mg/dL 7   Creatinine Latest Ref Range: 0.66 - 1.25 mg/dL 0.66   GFR Estimate Latest Ref Range: >60 mL/min/1.73m2 >90   Calcium Latest Ref Range: 8.5 - 10.1 mg/dL 8.8   Anion Gap Latest Ref Range: 3 - 14 mmol/L 4   Glucose Latest Ref Range: 70 - 99 mg/dL 130 (H)   WBC Latest Ref Range: 4.0 - 11.0 10e3/uL 11.9 (H)   Hemoglobin Latest Ref Range: 13.3 - 17.7 g/dL 8.2 (L)   Hematocrit Latest Ref Range: 40.0 - 53.0 % 26.5 (L)   Platelet Count Latest Ref Range: 150 - 450 10e3/uL 527 (H)   RBC Count Latest Ref Range: 4.40 - 5.90 10e6/uL 3.07 (L)   MCV Latest Ref Range: 78 - 100 fL 86   MCH Latest Ref Range: 26.5 - 33.0 pg 26.7   MCHC Latest Ref Range: 31.5 - 36.5 g/dL 30.9 (L)   RDW Latest Ref Range: 10.0 - 15.0 % 12.9           Assessment: Sonya Calero is a 51 year old male with epidural abscess and discitis s/p the following procedures w/ Dr. Calixto:     1. ACDF C5-6,C7-T1, T1-T2 on 2/9  2. PISF C5-T2 w/ laminectomies at C5-6, SPO C5-6. C6-7 on 2/12     Cultures with MSSA and Granulicatella adjacens. ID currently recommending on IV ancef and vancomycin  Plan PREM     Sinus tachycardia, likely physiologic  Stable, no significant change with IV fluid bolus last pm  Plan monitor    Anemia  Secondary to surgeries, infection  Plan monitor    DVT proph mechanical

## 2022-02-13 NOTE — ANESTHESIA CARE TRANSFER NOTE
Patient: Dave Calero    Procedure: Procedure(s):  Posterior instrumented spinal fusion cervical 5 to thoracic 2, with laminectomies at Cervical 5, Cervical 6, Smithe Tucker Oseotomy Cervical 5-6, Cervical 6-7        Diagnosis: Epidural abscess [G06.2]  Discitis of cervical region [M46.42]  Cervical spondylosis with myelopathy [M47.12]  Diagnosis Additional Information: No value filed.    Anesthesia Type:   General     Note:    Oropharynx: oropharynx clear of all foreign objects  Level of Consciousness: drowsy  Oxygen Supplementation: face mask  Level of Supplemental Oxygen (L/min / FiO2): 4  Independent Airway: airway patency satisfactory and stable  Dentition: dentition unchanged  Vital Signs Stable: post-procedure vital signs reviewed and stable  Report to RN Given: handoff report given  Patient transferred to: PACU    Handoff Report: Identifed the Patient, Identified the Reponsible Provider, Reviewed the pertinent medical history, Discussed the surgical course, Reviewed Intra-OP anesthesia mangement and issues during anesthesia, Set expectations for post-procedure period and Allowed opportunity for questions and acknowledgement of understanding      Vitals:  Vitals Value Taken Time   /99 02/12/22 1800   Temp 36.6  C (97.9  F) 02/12/22 1700   Pulse 110 02/12/22 1800   Resp 18 02/12/22 1730   SpO2 92 % 02/12/22 1804   Vitals shown include unvalidated device data.    Electronically Signed By: Rubi Garcia MD  February 12, 2022  6:50 PM

## 2022-02-13 NOTE — PROGRESS NOTES
"  Orthopaedic Surgery Daily Progress Note     Subjective: NAEON. Pain somewhat controlled on current pain regimen, using IV and oral pain medications. Had tachycardia to 220s for 15-30 sec yesterday evening, EKG ordered. Lidocaine gtt discontinued. Placed on tele. Right forearm numbness unchanged. Voiding spontaneously. Passing flatus.    Physical Exam   /73 (BP Location: Right arm)   Pulse 110   Temp 97.6  F (36.4  C) (Oral)   Resp 16   Ht 1.753 m (5' 9\")   Wt 78.9 kg (174 lb)   SpO2 97%   BMI 25.70 kg/m      Intake/Output Summary (Last 24 hours) at 2/11/2022 0634  Last data filed at 2/10/2022 2200  Gross per 24 hour   Intake 240 ml   Output 1035 ml   Net -795 ml     General: Alert, well-appearing in no acute distress.  Respiratory: Non-labored breathing.   Cardiovascular: Extremities warm and well perfused  Extremities: moving all four extremities.   MSK:    -[place] J collar in place  -Anterior and posterior spine dressings c/d/i  -Anterior DIANELYS drains x 2 and posterior neck drain x1 in place with serosanguinous drainage    Cervical spine:    Motor -     C5: Deltoids R 5/5 and L 5/5 strength    C6: Biceps R 5/5 and L 5/5 strength     C7: Triceps R 5/5 and L 5/5 strength     C8:  R 5/5 and L 5/5 strength     T1: Dorsal interossei R 4/5 and L 4/5 strength        Sensation: intact to light touch in C5-T1 - slightly diminished right T1    Drain 1: 0/0cc  Drain 2: 5/0cc  Posterior neck drain: 60/30cc    Labs    Complete Blood Count   Recent Labs   Lab 02/12/22  1153 02/12/22  0956 02/12/22  0540 02/11/22  0540 02/10/22  0544 02/09/22  1438   WBC  --   --   --  11.2*  --  13.9*   HGB 8.4* 9.3* 9.8* 9.1*   < > 12.1*   PLT  --   --   --  588*  --  733*    < > = values in this interval not displayed.     Basic Metabolic Panel  Recent Labs   Lab 02/12/22  1153 02/12/22  0956 02/12/22  0646 02/11/22  0540 02/10/22  0916 02/10/22  0539 02/09/22 2112 02/09/22  1438    138 138  --  137  --  136  --  137 "   POTASSIUM 3.8 3.8  --  3.8 4.4 5.9*  --  3.4   CHLORIDE  --   --   --  104  --  108  --  102   CO2  --   --   --  28  --  26  --  30   BUN  --   --   --  10  --  9  --  12   CR  --   --   --  0.69  --  0.70  --  0.83   * 127* 105* 110*  --  433*   < > 130*    < > = values in this interval not displayed.     Coagulation Profile  Recent Labs   Lab 02/09/22  1438   INR 0.98     Assessment/Plan:  Assessment: Dave Calero is a 51 year old male with epidural abscess and discitis s/p the following procedures w/ Dr. Calixto:    1. ACDF C5-6,C7-T1, T1-T2 on 2/9  2. PISF C5-T2 w/ laminectomies at C5-6, SPO C5-6. C6-7 on 2/12     Cultures with Staph aureus and Granulicatella adjacens. ID currently recommending on IV ancef and vancomycin, pending PREM results.    Plan:  Orthopedics Primary  Activity: Up with assist until independent. No excessive bending or twisting. No lifting >10 lbs x 6 weeks. No Scar lift for transfers.   Weight bearing status: WBAT.  Pain management: Transition from IV to PO as tolerated.    Antibiotics: ancef and vanco per ID  Diet: regular diet  DVT prophylaxis: SCDs only. No chemical DVT ppx needed at discharge.  Labs: Hgb POD#1.  Bracing/Splinting: [place] J in place. Can remove for cares  Dressings: Keep dressing CDI.   Drains: Document output per shift, will be discontinued at Orthopedic Surgery discretion.  Jerry catheter: remove POD#1  Physical Therapy/Occupational Therapy: Eval and treat.  Cultures: Pending, follow culture results closely.    Consults: IM,ID, PT, OT, orthotics.  Follow-up: TBD  Disposition: Pending progress with therapies, pain control on orals, and medical stability, anticipate discharge to home on POD3-5     Olga Lidia Taveras MD  PGY-4  Orthopaedic Surgery

## 2022-02-13 NOTE — PROGRESS NOTES
02/13/22 0845   Quick Adds   Type of Visit Initial PT Evaluation       Present no   Language English   Living Environment   People in home spouse   Current Living Arrangements house   Home Accessibility stairs to enter home;stairs within home   Number of Stairs, Main Entrance 2   Stair Railings, Main Entrance none   Number of Stairs, Within Home, Primary other (see comments)  (flight-does not need to complete)   Stair Railings, Within Home, Primary railings safe and in good condition   Transportation Anticipated family or friend will provide   Living Environment Comments Has 2 small stairs to enter with no rail but can grab door frame. Will have assist upon return home   Self-Care   Usual Activity Tolerance good   Current Activity Tolerance moderate   Regular Exercise No   Equipment Currently Used at Home none   Disability/Function   Hearing Difficulty or Deaf no   Wear Glasses or Blind no   Concentrating, Remembering or Making Decisions Difficulty no   Difficulty Communicating no   Difficulty Eating/Swallowing no   Walking or Climbing Stairs Difficulty no   Dressing/Bathing Difficulty no   Toileting issues no   Doing Errands Independently Difficulty (such as shopping) no   Fall history within last six months no   Change in Functional Status Since Onset of Current Illness/Injury no   General Information   Onset of Illness/Injury or Date of Surgery 02/12/22   Referring Physician Lam Galaviz MD   Patient/Family Therapy Goals Statement (PT) Did not endorse   Pertinent History of Current Problem (include personal factors and/or comorbidities that impact the POC) Posterior instrumented spinal fusion cervical 5 to thoracic 2, with laminectomies at Cervical 5, Cervical 6, Smithe Tucker Oseotomy Cervical 5-6, Cervical 6-7    Existing Precautions/Restrictions fall;spinal  ([place] J at all times)   Weight-Bearing Status - LUE weight-bearing as tolerated   Weight-Bearing Status - RUE  weight-bearing as tolerated   Weight-Bearing Status - LLE full weight-bearing   Weight-Bearing Status - RLE weight-bearing as tolerated   General Observations Supine in bed upon arrival, agreeable to PT   Cognition   Orientation Status (Cognition) oriented x 3   Affect/Mental Status (Cognition) WNL   Follows Commands (Cognition) WNL   Pain Assessment   Patient Currently in Pain Yes, see Vital Sign flowsheet   Integumentary/Edema   Integumentary/Edema no deficits were identifed   Posture    Posture Protracted shoulders;Forward head position;Kyphosis   Posture Comments Mild sitting EOB and standing   Range of Motion (ROM)   ROM Comment Did not formally assess, demonstrates functional ROM with mobility   Strength   Strength Comments Did not formally assess, demonstrates functional strength with mobility   Bed Mobility   Comment (Bed Mobility) Completes supine<>sit transfer with supervision to near independent with HOB elevated   Transfers   Transfer Safety Comments Completes sit<>stand transfer with supervision no AD but braces on IV pole or wall depending   Gait/Stairs (Locomotion)   Comment (Gait/Stairs) Ambulates around room with supervision with and without IV pole, some instability noted but does a good job compensating   Balance   Balance Comments Independent sitting balance, supervision standing dynamic balance without UE support   Sensory Examination   Sensory Perception WNL   Clinical Impression   Criteria for Skilled Therapeutic Intervention yes, treatment indicated   PT Diagnosis (PT) impaired functional mobility   Influenced by the following impairments increased post-op pain, precautions, decreased UE strength   Functional limitations due to impairments impaired bed mobility, transfers and ambulation   Clinical Presentation Stable/Uncomplicated   Clinical Presentation Rationale Posterior instrumented spinal fusion cervical 5 to thoracic 2, with laminectomies at Cervical 5, Cervical 6, Erica Tuckre  Oseotomy Cervical 5-6, Cervical 6-7. Mobilizing very well overall   Clinical Decision Making (Complexity) low complexity   Therapy Frequency (PT) Daily   Predicted Duration of Therapy Intervention (days/wks) 4 days   Planned Therapy Interventions (PT) balance training;bed mobility training;gait training;home exercise program;stair training;strengthening;progressive activity/exercise;risk factor education;home program guidelines;transfer training   Risk & Benefits of therapy have been explained evaluation/treatment results reviewed;care plan/treatment goals reviewed;risks/benefits reviewed;current/potential barriers reviewed   PT Discharge Planning    PT Discharge Recommendation (DC Rec) home with assist;home with outpatient physical therapy   PT Rationale for DC Rec PT: Would benefit from eventual OP PT for return to PLOF and previous level of activity   PT Brief overview of current status  PT: Supervision with bed mobility, transfers and short distance ambulation   Total Evaluation Time   Total Evaluation Time (Minutes) 8

## 2022-02-13 NOTE — PROGRESS NOTES
D: Pt  A/O x4. VSS ( pt runs tachy EKG-done DR lombardo.) . Lungs- CL, no c/o chest pain/ SOB. sats= 96 % RA.  . Bowel+, passing gas BM 2/11. PP- +. No edema. CMS- intact rt arm numbness resolving.. Pt taking PO food/ fluids well. Voiding Good amounts via urinal . Pain/CAPA - pt taking PO pain meds w/ IV break through. Appears to be managing it.. NECK  A/P  Dressing -C/D/I.     . HV- out put min. JPx 2 - zero output this shift.IVF'd- SL. Pt up walked with P.T. today ( see there notes). Francesca RÍOS  Collar -on. NO SKIN ISSUES.  A: con't to monitor. Call light in reach. Pt able to make needs known.

## 2022-02-13 NOTE — PLAN OF CARE
"/73 (BP Location: Right arm)   Pulse 110   Temp 97.6  F (36.4  C) (Oral)   Resp 16   Ht 1.753 m (5' 9\")   Wt 78.9 kg (174 lb)   SpO2 97%   BMI 25.70 kg/m      Telemetry monitoring in place. [place], NC-2L. Pt HR was consistently between 117-120, MD crockerd. HR later came down to 110-112. Pt denies chest pain, SOB, drowsiness, or palpitation. Swallows meds without difficulty. Encouraged to use incentive spirometry. Pt reports pain with deep breathing. Pt rates 6 when still and 10 with movement. Pt received prn Tylenol 650 mg @0008 & 0531, Dilaudid 0.4 mg @0010 & 0525, Oxycodone 10 mg @0244, and Robaxin @0406 for muscle spasm. Pt has not been OOB this shift d/t pain. Alert and oriented x 4, numbness to medial Right forearm - baseline and has been getting better after surgery per pt, Lypoma L forearm. Posterior and anterior cervical and R hip incisions CDI. [place] j collar in place. Pt voids adequately without difficulty, frequency. Urinal at bedside. No BM this shift, passing flatus. Monitor HR, manage pain, assess for S/S of infection. Call light within reach. Will continue monitor.       Hemovac =30 ml  JP1 & JP2 =0         "

## 2022-02-13 NOTE — PLAN OF CARE
Pt A&O x4. On 2L NC and CAPNO 97% SpO2. Pt in significant amount of pain 10/10 or 9/10 throughout shift. Pt HR increased to 220 for about 15-30 seconds, then returned to 110-115 bpm range. Pt did not experience any chest pain, palpitations, dizziness, SOB, during this time or at any other time throughout shift. Spoke with Lamar Tsang MD medicine, EKG ordered and result was sinus tachycardia. Tele tachycardia 115-120 throughout shift. 1000ml bolus given. Lidocaine dtt discontinued per Will Sharon ortho resident and valium q6 Po ordered. Would consider ketamine or PCA pump if need be. Pt voided x3 on shift, PVR 158ml. Pt denies nausea, oral intake adequate. Did not eat any food but ordered snacks to have at bedside.     Hemovac 60ml on shift  Jp1 and Jay 2 0-5ml each  Neck incision posterior and anterior CDI, Rt hip incision CDI no other skin issues    Pain medicine given:   IV diluadid 0.4mg q2 x2 at 1830 and 2204  PO Valium q6  x1 at 2036  PO Robaxin q6 x1 at 1914  PO Oxycodone x2 10mg at 2000 and 2300  PO Atarax 25mg 2229  PO tylenol given x1 2036

## 2022-02-13 NOTE — PROVIDER NOTIFICATION
Critical lab result of blood cultures 2/11/22 gram positive cocci in clusters. Hospitalist Martha Rivas MD notified and Ortho Olga Lidia Taveras.

## 2022-02-13 NOTE — PHARMACY-VANCOMYCIN DOSING SERVICE
Pharmacy Vancomycin Note  Date of Service 2022  Patient's  1970   51 year old, male    Indication: Bone and Joint Infection  Day of Therapy: 4  Current vancomycin regimen: 1250 mg IV q12h  Current vancomycin monitoring method: AUC  Current vancomycin therapeutic monitoring goal: 400-600 mg*h/L    Insight[employer] Prediction of Current Vancomycin Regimen  Regimen: 1250 mg IV every 12 hours.  Start time: 03:04 on 2022  Exposure target: AUC24 (range)400-600 mg/L.hr   AUC24,ss: 467 mg/L.hr  Probability of AUC24 > 400: 80 %  Ctrough,ss: 13.4 mg/L  Probability of Ctrough,ss > 20: 8 %  Probability of nephrotoxicity (Lodise VU ): 9 %    Current estimated CrCl = Estimated Creatinine Clearance: 149.4 mL/min (based on SCr of 0.66 mg/dL).    Creatinine for last 3 days  2022:  5:40 AM Creatinine 0.69 mg/dL  2022:  6:34 AM Creatinine 0.66 mg/dL    Recent Vancomycin Levels (past 3 days)  2022:  8:02 AM Vancomycin 17.3 mg/L    Vancomycin IV Administrations (past 72 hours)                   vancomycin (VANCOCIN) 1,250 mg in sodium chloride 0.9 % 250 mL intermittent infusion (mg) 1,250 mg New Bag 22 0346     1,250 mg New Bag 22 0347     1,250 mg New Bag 22 1719     1,250 mg New Bag  0314     1,250 mg New Bag 02/10/22 1517                Nephrotoxins and other renal medications (From now, onward)    Start     Dose/Rate Route Frequency Ordered Stop    02/10/22 1230  vancomycin (VANCOCIN) 1,250 mg in sodium chloride 0.9 % 250 mL intermittent infusion         1,250 mg  over 90 Minutes Intravenous EVERY 12 HOURS 02/10/22 1206               Contrast Orders - past 72 hours (72h ago, onward)            Start     Dose/Rate Route Frequency Ordered Stop    22 1500  perflutren diluted 1mL to 2mL with saline (OPTISON) diluted injection 5 mL         5 mL Intravenous ONCE 22 1444 22 1444    02/10/22 1500  gadobutrol (GADAVIST) injection 7.5 mL         7.5 mL Intravenous  ONCE 02/10/22 1446 02/10/22 1447           Interpretation of levels and current regimen:  Vancomycin level is reflective of -600    Has serum creatinine changed greater than 50% in last 72 hours: No    Urine output:  unable to determine    Renal Function: Stable    Insight[employer] Prediction of Planned New Vancomycin Regimen  Loading dose: N/A  Regimen: 1250 mg IV every 12 hours.  Start time: 03:04 on 02/14/2022  Exposure target: AUC24 (range)400-600 mg/L.hr   AUC24,ss: 467 mg/L.hr  Probability of AUC24 > 400: 80 %  Ctrough,ss: 13.4 mg/L  Probability of Ctrough,ss > 20: 8 %  Probability of nephrotoxicity (Lodise VU 2009): 9 %    Plan:  1. Continue Current Dose since patient missed PM dose on 2/12 due to RTOR for spinal fusion.  2. Vancomycin monitoring method: AUC  3. Vancomycin therapeutic monitoring goal: 400-600 mg*h/L  4. Pharmacy will check vancomycin levels as appropriate in 1-3 Days.  5. Serum creatinine levels will be ordered daily for the first week of therapy and at least twice weekly for subsequent weeks.    Tobias Long, [facility]

## 2022-02-14 ENCOUNTER — APPOINTMENT (OUTPATIENT)
Dept: GENERAL RADIOLOGY | Facility: CLINIC | Age: 52
DRG: 453 | End: 2022-02-14
Payer: COMMERCIAL

## 2022-02-14 ENCOUNTER — APPOINTMENT (OUTPATIENT)
Dept: PHYSICAL THERAPY | Facility: CLINIC | Age: 52
DRG: 453 | End: 2022-02-14
Payer: COMMERCIAL

## 2022-02-14 LAB
ANION GAP SERPL CALCULATED.3IONS-SCNC: 4 MMOL/L (ref 3–14)
BACTERIA BLD CULT: NO GROWTH
BACTERIA TISS BX CULT: ABNORMAL
BACTERIA TISS BX CULT: ABNORMAL
BUN SERPL-MCNC: 7 MG/DL (ref 7–30)
CALCIUM SERPL-MCNC: 9.3 MG/DL (ref 8.5–10.1)
CHLORIDE BLD-SCNC: 104 MMOL/L (ref 94–109)
CO2 SERPL-SCNC: 27 MMOL/L (ref 20–32)
CREAT SERPL-MCNC: 0.69 MG/DL (ref 0.66–1.25)
ERYTHROCYTE [DISTWIDTH] IN BLOOD BY AUTOMATED COUNT: 12.8 % (ref 10–15)
GFR SERPL CREATININE-BSD FRML MDRD: >90 ML/MIN/1.73M2
GLUCOSE BLD-MCNC: 128 MG/DL (ref 70–99)
GLUCOSE BLD-MCNC: 129 MG/DL (ref 70–99)
HCT VFR BLD AUTO: 29.6 % (ref 40–53)
HGB BLD-MCNC: 9.1 G/DL (ref 13.3–17.7)
LACTATE SERPL-SCNC: 1.3 MMOL/L (ref 0.7–2)
MCH RBC QN AUTO: 27 PG (ref 26.5–33)
MCHC RBC AUTO-ENTMCNC: 30.7 G/DL (ref 31.5–36.5)
MCV RBC AUTO: 88 FL (ref 78–100)
PLATELET # BLD AUTO: 581 10E3/UL (ref 150–450)
POTASSIUM BLD-SCNC: 4.4 MMOL/L (ref 3.4–5.3)
RBC # BLD AUTO: 3.37 10E6/UL (ref 4.4–5.9)
SODIUM SERPL-SCNC: 135 MMOL/L (ref 133–144)
VANCOMYCIN SERPL-MCNC: 11.2 MG/L
WBC # BLD AUTO: 12.5 10E3/UL (ref 4–11)

## 2022-02-14 PROCEDURE — 250N000013 HC RX MED GY IP 250 OP 250 PS 637: Performed by: STUDENT IN AN ORGANIZED HEALTH CARE EDUCATION/TRAINING PROGRAM

## 2022-02-14 PROCEDURE — 82310 ASSAY OF CALCIUM: CPT | Performed by: INTERNAL MEDICINE

## 2022-02-14 PROCEDURE — 120N000002 HC R&B MED SURG/OB UMMC

## 2022-02-14 PROCEDURE — 250N000011 HC RX IP 250 OP 636: Performed by: ORTHOPAEDIC SURGERY

## 2022-02-14 PROCEDURE — 36415 COLL VENOUS BLD VENIPUNCTURE: CPT | Performed by: ORTHOPAEDIC SURGERY

## 2022-02-14 PROCEDURE — 250N000013 HC RX MED GY IP 250 OP 250 PS 637: Performed by: CLINICAL NURSE SPECIALIST

## 2022-02-14 PROCEDURE — 72040 X-RAY EXAM NECK SPINE 2-3 VW: CPT | Mod: 26 | Performed by: RADIOLOGY

## 2022-02-14 PROCEDURE — 250N000013 HC RX MED GY IP 250 OP 250 PS 637: Performed by: PHYSICIAN ASSISTANT

## 2022-02-14 PROCEDURE — 36415 COLL VENOUS BLD VENIPUNCTURE: CPT | Performed by: INTERNAL MEDICINE

## 2022-02-14 PROCEDURE — 83605 ASSAY OF LACTIC ACID: CPT | Performed by: INTERNAL MEDICINE

## 2022-02-14 PROCEDURE — 99233 SBSQ HOSP IP/OBS HIGH 50: CPT | Performed by: STUDENT IN AN ORGANIZED HEALTH CARE EDUCATION/TRAINING PROGRAM

## 2022-02-14 PROCEDURE — 97116 GAIT TRAINING THERAPY: CPT | Mod: GP

## 2022-02-14 PROCEDURE — 72040 X-RAY EXAM NECK SPINE 2-3 VW: CPT

## 2022-02-14 PROCEDURE — 97530 THERAPEUTIC ACTIVITIES: CPT | Mod: GP

## 2022-02-14 PROCEDURE — 250N000011 HC RX IP 250 OP 636: Performed by: INTERNAL MEDICINE

## 2022-02-14 PROCEDURE — 82947 ASSAY GLUCOSE BLOOD QUANT: CPT | Performed by: INTERNAL MEDICINE

## 2022-02-14 PROCEDURE — 85027 COMPLETE CBC AUTOMATED: CPT | Performed by: INTERNAL MEDICINE

## 2022-02-14 PROCEDURE — 258N000003 HC RX IP 258 OP 636: Performed by: ORTHOPAEDIC SURGERY

## 2022-02-14 PROCEDURE — 258N000003 HC RX IP 258 OP 636: Performed by: INTERNAL MEDICINE

## 2022-02-14 PROCEDURE — 80202 ASSAY OF VANCOMYCIN: CPT | Performed by: ORTHOPAEDIC SURGERY

## 2022-02-14 PROCEDURE — 99231 SBSQ HOSP IP/OBS SF/LOW 25: CPT | Performed by: INTERNAL MEDICINE

## 2022-02-14 RX ORDER — LIDOCAINE 4 G/G
1 PATCH TOPICAL
Status: DISCONTINUED | OUTPATIENT
Start: 2022-02-14 | End: 2022-02-16 | Stop reason: HOSPADM

## 2022-02-14 RX ADMIN — METHOCARBAMOL 750 MG: 750 TABLET ORAL at 21:56

## 2022-02-14 RX ADMIN — SENNOSIDES AND DOCUSATE SODIUM 1 TABLET: 50; 8.6 TABLET ORAL at 19:13

## 2022-02-14 RX ADMIN — FLUOXETINE 20 MG: 20 CAPSULE ORAL at 19:13

## 2022-02-14 RX ADMIN — OXYCODONE HYDROCHLORIDE 10 MG: 10 TABLET ORAL at 16:02

## 2022-02-14 RX ADMIN — FAMOTIDINE 20 MG: 20 TABLET, FILM COATED ORAL at 08:12

## 2022-02-14 RX ADMIN — Medication 2 G: at 16:40

## 2022-02-14 RX ADMIN — OXYCODONE HYDROCHLORIDE 5 MG: 5 TABLET ORAL at 00:35

## 2022-02-14 RX ADMIN — VANCOMYCIN HYDROCHLORIDE 1500 MG: 1 INJECTION, POWDER, LYOPHILIZED, FOR SOLUTION INTRAVENOUS at 17:53

## 2022-02-14 RX ADMIN — OXYCODONE HYDROCHLORIDE 10 MG: 10 TABLET ORAL at 21:59

## 2022-02-14 RX ADMIN — POLYETHYLENE GLYCOL 3350 17 G: 17 POWDER, FOR SOLUTION ORAL at 08:13

## 2022-02-14 RX ADMIN — LIDOCAINE PATCH 4% 1 PATCH: 40 PATCH TOPICAL at 09:21

## 2022-02-14 RX ADMIN — HYDROXYZINE HYDROCHLORIDE 50 MG: 50 TABLET, FILM COATED ORAL at 02:13

## 2022-02-14 RX ADMIN — FAMOTIDINE 20 MG: 20 TABLET, FILM COATED ORAL at 19:13

## 2022-02-14 RX ADMIN — HYDROXYZINE HYDROCHLORIDE 50 MG: 50 TABLET, FILM COATED ORAL at 21:55

## 2022-02-14 RX ADMIN — VANCOMYCIN HYDROCHLORIDE 1250 MG: 1 INJECTION, POWDER, LYOPHILIZED, FOR SOLUTION INTRAVENOUS at 04:47

## 2022-02-14 RX ADMIN — Medication 2 G: at 08:39

## 2022-02-14 RX ADMIN — OXYCODONE HYDROCHLORIDE 10 MG: 10 TABLET ORAL at 04:43

## 2022-02-14 RX ADMIN — METHOCARBAMOL 750 MG: 750 TABLET ORAL at 00:35

## 2022-02-14 RX ADMIN — SENNOSIDES AND DOCUSATE SODIUM 1 TABLET: 50; 8.6 TABLET ORAL at 08:13

## 2022-02-14 RX ADMIN — METHOCARBAMOL 750 MG: 750 TABLET ORAL at 09:20

## 2022-02-14 RX ADMIN — GABAPENTIN 300 MG: 300 CAPSULE ORAL at 19:13

## 2022-02-14 RX ADMIN — ACETAMINOPHEN 975 MG: 325 TABLET, FILM COATED ORAL at 08:12

## 2022-02-14 RX ADMIN — Medication 2 G: at 00:26

## 2022-02-14 RX ADMIN — OXYCODONE HYDROCHLORIDE 10 MG: 10 TABLET ORAL at 12:27

## 2022-02-14 RX ADMIN — DIAZEPAM 5 MG: 5 TABLET ORAL at 16:40

## 2022-02-14 RX ADMIN — METHOCARBAMOL 750 MG: 750 TABLET ORAL at 16:02

## 2022-02-14 RX ADMIN — OXYCODONE HYDROCHLORIDE 10 MG: 10 TABLET ORAL at 19:13

## 2022-02-14 RX ADMIN — Medication 50 MCG: at 08:13

## 2022-02-14 RX ADMIN — OXYCODONE HYDROCHLORIDE 10 MG: 10 TABLET ORAL at 08:12

## 2022-02-14 RX ADMIN — ACETAMINOPHEN 975 MG: 325 TABLET, FILM COATED ORAL at 21:56

## 2022-02-14 RX ADMIN — GABAPENTIN 300 MG: 300 CAPSULE ORAL at 08:13

## 2022-02-14 ASSESSMENT — ACTIVITIES OF DAILY LIVING (ADL)
ADLS_ACUITY_SCORE: 5

## 2022-02-14 NOTE — PLAN OF CARE
Dave Calero is a 51 year old male who is here for ACDF along with PCF d/t abscess.    NURSING ASSESSMENT:  Pt is A/Ox4. He has numbness to R forearm that began post surgery; ortho is aware; pt reports no changes in this. Pt continues to have positive blood cultures, increased WBC, and tachycardia; therefore, he will have a PREM done on EB in the OR tomorrow at 12:05. Charge RN, Alma, will set up transport for pt to arrive by 11am to unit 3C. Pt wears [place] J collar at all times and is up with SBA due to slight impulsivity/unsteadiness. Dressings are CDI. HV was removed and Xrays were completed.     PAIN MANAGEMENT:   Pain is somewhat controlled with current analgesics. Pt would like pain to be at 4/10 but has been rating pain at 6/10.  Medication(s) being used: acetaminophen, robaxin, oxycodone, lidocaine patch, and icy hot patches. Pt has valium and atarax available; when offered, pt declined.    Pt encouraged to try nonpharmacologic interventions of ice/cold. Pt did try these. Interventions were somewhat successful.    NURSING PLAN:  Pt NPO at midnight for PREM tomorrow. Continue pain management. Encourage mobility as tolerated. Follow POC.    DISCHARGE DISPOSITION:  TBD    Jaz Faustin RN

## 2022-02-14 NOTE — PLAN OF CARE
Pt A&O x4. Pain better managed today 6/10 with prn tylenol, oxycodone, robaxin, and atarax all given x1. Pt has numbness right arm baseline, CMS intact, no new weakness. Denies SOB, chest pain, palpitations, dizziness,vision changes. Pt voiding adequate amounts per pt report. Pt did not order dinner after encouragement, denies nausea. Pt passing gas. Both anterior DIANELYS's removed per orders and new dressingplaced. Hemovac in place output 15ml. [place] J collar on at all times. Pt on RA now at end of shift, but was on 2L NC due to desat. Pt had some sweatiness but resoled.     Continue to monitor HR, now in 120 range but previously was 130's at beginning of shift. Blood cultures +gram cocci in clusters and critical results reported to hospitalist MD. No changes necessary. Continue POC

## 2022-02-14 NOTE — PROGRESS NOTES
General ID Service: Follow Up Note      Patient:  Dave Caelro, Date of birth 1970, Medical record number [id number]  Date of Visit:  February 14, 2022         Assessment and Recommendations:   ID Problem List:  1. Cervical discitis with epidural abscess  -s/p ACDF (2/9) and posterior instrmentation (2/10)  -Intraop cultures with MSSA, Cutibacterium  2. Granulicatella and MSSA bacteremia    Recommendations:  1. Continue vancomycin and cefazolin  2. Will await PREM and follow-up results  3. Anticipate at least 8 weeks of antibiotics likely followed by indefinite chronic suppressive therapy    Discussion:  50yo M anitted with cervical discitis and epidural abscess, now s/p ACDF (2/9) and posterior instrumentation (2/10) with intraoperative cultures growing MSSA and Cutibacterium. Also with bacteremia Granulicatella and MSSA). PREM is ordered and pending. Currently on vancomycin and cefazolin for broad coverage of isolated pathogens thus far. Will await PREM results to determine aggressiveness of antibiotic regimen, anticipate at least 8 weeks of therapy followed by chronic suppressive regimen.    Deion Way MD  Division of Infectious Diseases and International Medicine  P: [phone]        Interval History:     Seen and examined. Says he is able to move a bit more each day, still with significant pain including occasional back spasms. Denies fevers, chills. Discussed plan to base abx regimen on PREM.        HPI:     Mr. Dave Calero is a 51 year old male with PMHx of HLD, ADHD, depression who was admitted on 2/9/22 with cervical discitis. Patient developed pain in the base of his neck and between his shoulder blades beginning on 1/3/22.  CTs of his thoracic and lumbar spine without concern at that time. However,  The pain persistent and he developed  sensation of a pill stuck in his throat and reports altered sensation to the ulnar aspects of his forearms bilaterally. Patient was  subsequently seen at [facility] on 2/8/22.  MRI was obtained and results returned on 2/9/22 concerning for cervical discitis/epidural abscess (MRI not available in the system). Due to this he was referred to the [facility] emergency department on 2/9/22 for further evaluation and treatment. Patient had anterior cervical diskectomy and fusion from C5-T1 as well as right anterior iliac crest autograft harvest (Dr. Calixto) on 2/9. Returned to the OR 2/12  for posterior instrumented fusion. Intraoperative cultures from 2/9 positive for Staphylococcus aureus (MSSA) and Cutibacterium acnes. Intraoperative cultures from 2/10 also positive for Staphylococcus aureus (MSSA) and Cutibacterium. In addition, blood culture from 2/9/22 is positive for Granulicatella and Staphylococcus aureus (MSSA). Blood culture from 2/11 (1 of 2 bottles) also positive for Micrococcus. He is on IV vancomycin and cefazolin.     Patient denies any recent infection. He denies trauma or skin abnormality on his neck. He denies IV drug use. He denies any prior histories of spine infection or surgery. No immunosuppressive condition. No prosthetic material in his heart valves or cardiovascular devices. Patient denies any dental infection or dental issue and denies recent dental procedure. He denies previous urine infection or urine symptom. He denies diarrhea or GI symptom. He reported that he had his colonoscopy which was reportedly normal.         Review of Systems:     Full 9 pt ROS obtained and negative unless noted above in assessment and interval history.         Current Antimicrobials     Vancomycin (2/10 - )  Cefazolin (2/9 - )         Physical Exam:   Ranges for vital signs:  Temp:  [98.1  F (36.7  C)-99.9  F (37.7  C)] 98.1  F (36.7  C)  Pulse:  [110-125] 120  Resp:  [16] 16  BP: (125-137)/(69-75) 137/75  SpO2:  [92 %-97 %] 93 %    Intake/Output Summary (Last 24 hours) at 2/14/2022 6994  Last data filed at 2/14/2022  0900  Gross per 24 hour   Intake 800 ml   Output 495 ml   Net 305 ml     Exam:   GENERAL:  well-developed, well-nourished, sitting in bed in no acute distress.   ENT:  Head is normocephalic, atraumatic. Oropharynx is moist without exudates or ulcers. Neck in [place] collar.  EYES:  Eyes have anicteric sclerae.    NECK:  Supple.  LUNGS:  Clear to auscultation.  CARDIOVASCULAR:  Regular rate and rhythm with no murmurs, gallops or rubs.  ABDOMEN:  Normal bowel sounds, soft, nontender.  EXT: Extremities warm and without edema.  SKIN:  No acute rashes.  Line is in place without any surrounding erythema.  NEUROLOGIC:  Grossly nonfocal.         Laboratory Data:   Reviewed.  Pertinent for:    Microbiology:  Culture   Date Value Ref Range Status   02/11/2022 No growth after 3 days  Preliminary   02/11/2022 Positive on the 3rd day of incubation (A)  Preliminary   02/11/2022 Micrococcus species (AA)  Preliminary     Comment:     1 of 2 bottles   02/10/2022 No Growth  Final   02/10/2022 No growth after 3 days  Preliminary   02/10/2022 No growth after 3 days  Preliminary   02/10/2022 1+ Cutibacterium (Propionibacterium) acnes (A)  Preliminary     Comment:     Susceptibilities done on previous cultures   02/10/2022 1+ Staphylococcus aureus (A)  Final     Comment:     Susceptibilities done on previous cultures   02/09/2022 2+ Cutibacterium (Propionibacterium) acnes (A)  Preliminary     Comment:     Susceptibility testing in progress   02/09/2022 1+ Staphylococcus aureus (A)  Final     Comment:     Susceptibilities done on previous cultures   02/09/2022 1+ Cutibacterium (Propionibacterium) acnes (A)  Preliminary     Comment:     Susceptibilities done on previous cultures   02/09/2022 1+ Staphylococcus aureus (A)  Final   02/09/2022 1+ Staphylococcus aureus (A)  Final   02/09/2022 3+ Cutibacterium (Propionibacterium) acnes (A)  Preliminary     Comment:     Susceptibilities done on previous cultures   02/09/2022 No Growth  Final    02/09/2022 Positive on the 1st day of incubation (A)  Final   02/09/2022 Granulicatella adiacens (AA)  Final     Comment:     1 of 2 bottles   02/09/2022 Staphylococcus aureus (AA)  Final     Comment:     1 of 2 bottles   02/09/2022 No growth after 4 days  Preliminary     Inflammatory Markers    Recent Labs   Lab Test 02/09/22  1438   CRP 74.0*     Metabolic Studies       Recent Labs   Lab Test 02/14/22  0642 02/13/22  0634 02/12/22  1153 02/12/22  0956 02/12/22  0646 02/11/22  0540 02/10/22  0916 02/10/22  0539 02/09/22  1608 02/09/22  1438    136 138 138  --  137  --  136  --  137   POTASSIUM 4.4 3.9 3.8 3.8  --  3.8 4.4 5.9*  --  3.4   CHLORIDE 104 104  --   --   --  104  --  108  --  102   CO2 27 28  --   --   --  28  --  26  --  30   ANIONGAP 4 4  --   --   --  5  --  2*  --  5   BUN 7 7  --   --   --  10  --  9  --  12   CR 0.69 0.66  --   --   --  0.69  --  0.70  --  0.83   GFRESTIMATED >90 >90  --   --   --  >90  --  >90  --  >90   *  128* 130*  130* 117* 127* 105* 110*  --  433*   < > 130*   VIANEY 9.3 8.8  --   --   --  8.8  --  8.9  --  9.8   LACT  --   --  0.8 1.0  --   --   --   --    < >  --     < > = values in this interval not displayed.     Hepatic Studies    Recent Labs   Lab Test 02/09/22  1438   BILITOTAL 0.3   ALKPHOS 133   ALBUMIN 2.8*   AST 12   ALT 44     Hematology Studies      Recent Labs   Lab Test 02/14/22  0642 02/13/22  0634 02/12/22  1153 02/12/22  0956 02/12/22  0540 02/11/22  0540 02/10/22  0544 02/09/22  1438   WBC 12.5* 11.9*  --   --   --  11.2*  --  13.9*   HGB 9.1* 8.2* 8.4* 9.3* 9.8* 9.1*   < > 12.1*   HCT 29.6* 26.5*  --   --   --  28.8*  --  38.5*   * 527*  --   --   --  588*  --  733*    < > = values in this interval not displayed.     Arterial Blood Gas Testing    Recent Labs   Lab Test 02/12/22  1153 02/12/22  0956   PH 7.48* 7.51*   PCO2 40 38   PO2 125* 171*   HCO3 30* 30*   O2PER 45 50.0      Urine Studies     Recent Labs   Lab Test 02/10/22  1918    URINEPH 5.5   NITRITE Negative   LEUKEST Negative   WBCU 1     Vancomycin Levels     Recent Labs   Lab Test 02/14/22  1202 02/13/22  0802   VANCOMYCIN 11.2 17.3     Transplant Immunosuppression Labs Latest Ref Rng & Units 2/14/2022 2/13/2022 2/11/2022 2/10/2022 2/9/2022   Creat 0.66 - 1.25 mg/dL 0.69 0.66 0.69 0.70 0.83   BUN 7 - 30 mg/dL 7 7 10 9 12   WBC 4.0 - 11.0 10e3/uL 12.5(H) 11.9(H) 11.2(H) - 13.9(H)   Neutrophil % - - 58 - 63          Imaging:   Echo Complete  Result Date: 2/11/2022  Interpretation Summary Global and regional left ventricular function is normal with an EF of 55-60%. The right ventricle is normal in function and size. No significant valvular abnormality. No pericardial effusion is present. IVC diameter <2.1 cm collapsing >50% with sniff suggests a normal RA pressure of 3 mmHg. There is no prior study for direct comparison.     MR Cervical Spine w/o & w Contrast  Result Date: 2/10/2022  Impression: 1. Early postsurgical changes of instrumented posterior spinal fusion with interbody devices at C5-C6, C7-T1 and T1-T2. 2. Bone marrow edema and associated enhancement involving the C6-T2 vertebrae, consistent with osteomyelitis. Epidural  phlegmon extending from phlegmon C5-6 down to T2-3. There are several questionable tiny fluid pockets pockets, for example in the anterior epidural space at C6 level. However it is mostly phlegmon. Edema and enhancement extends into bilateral neural foramina from C6 to T2. Associated severe spinal canal stenosis at C6-7 at C7-T1 and moderate spinal canal stenosis C5-6. 3. Extensive retropharyngeal/prevertebral soft tissue edema and enhancement extending from C5 down to T3 level. This is combination of postsurgical changes and inflammatory/infectious findings. I have personally reviewed the examination and initial interpretation and I agree with the findings. ANDRE ESTRADA MD   SYSTEM ID:  [id number]    CT Cervical Spine w/o Contrast  Result Date:  2/11/2022  Impression: 1. Postsurgical changes of anterior cervical discectomy and fusion C5-6 and C7-T2 with associated postoperative prevertebral soft tissue edema and emphysema. Consider MRI for evaluation of soft tissue abscess and osteomyelitis. 2. Multilevel cervical spondylosis as described above. I have personally reviewed the examination and initial interpretation and I agree with the findings. HOME ROSE MD   SYSTEM ID:  [id number]

## 2022-02-14 NOTE — PLAN OF CARE
"Dave Calero is a 51 yr old male admitted on 2/9/22 for the following surgeries -    1. ACDF C5-6,C7-T1, T1-T2 on 2/9  2. PISF C5-T2 w/ laminectomies at C5-6, SPO C5-6. C6-7 on 2/12    Pt Hx ADHD, depression, hyperlipidemia.    /74 (BP Location: Left arm)   Pulse 118   Temp 98.9  F (37.2  C) (Oral)   Resp 16   Ht 1.753 m (5' 9\")   Wt 78.9 kg (174 lb)   SpO2 93%   BMI 25.70 kg/m  .     Pt is alert and oriented x 4. Calm, cooperative w/ periods of anxiety related to pain/muscle spasms. Numbness reported in RUE - ortho team aware. No edema. LS clear. Denies SOB. Denies chest pain. Currently on RA. Pt placed on 2L O2 via NC for overnight - maintaining 98%. Last BM on 2/14/22. Voids without difficulty. SBA. Skin intact ex anterior and posterior spinal incision. L PIV running fluids TKO and antibiotics. Regular diet, tolerating well.     Pain rated 5-10/10. Pain managed with PRN tylenol, oxycodone, robaxin, and atarax. PRN Valium available for muscle spasms and/or anxiety. Pt requested valium - given at 1640.     Pts wife mentioned pt was taking fluoxetine 30mg daily for depression prior to admission. Provider paged. Pt restarted on fluoxetine 20mg daily - per provider the medication can be increased in a few days since pt was off medication for 5 days.    Pt called c/o feeling hot/\"burning up\" @ 2145. Pt temp increased to 99.6F. Pt had been refusing tylenol for majority of the shift. Ice packs applied to neck/forehead. PRN tylenol administered. Provider updated - continue to monitor.     Continue POC, discharge TBD.  "

## 2022-02-14 NOTE — PLAN OF CARE
OT: Orders received. Chart reviewed and discussed with care team.  OT not indicated due to per PT, patient does not have OT needs.  Defer discharge recommendations to PT.  Will complete orders.

## 2022-02-14 NOTE — PHARMACY-VANCOMYCIN DOSING SERVICE
Pharmacy Vancomycin Note  Date of Service 2022  Patient's  1970   51 year old, male    Indication: epidural abscess and discitis   Day of Therapy: started on 22  Current vancomycin regimen:  1250 mg IV q12h  Current vancomycin monitoring method: AUC  Current vancomycin therapeutic monitoring goal: 400-600 mg*h/L    InsightRX Prediction of Current Vancomycin Regimen  Loading dose: N/A  Regimen: 1250 mg IV every 12 hours.  Start time: 16:47 on 2022  Exposure target: AUC24 (range)400-600 mg/L.hr   AUC24,ss: 422 mg/L.hr  Probability of AUC24 > 400: 65 %  Ctrough,ss: 10.8 mg/L  Probability of Ctrough,ss > 20: 1 %  Probability of nephrotoxicity (Lodise VU ): 6 %    Current estimated CrCl = Estimated Creatinine Clearance: 142.9 mL/min (based on SCr of 0.69 mg/dL).    Creatinine for last 3 days  2022:  6:34 AM Creatinine 0.66 mg/dL  2022:  6:42 AM Creatinine 0.69 mg/dL    Recent Vancomycin Levels (past 3 days)  2022:  8:02 AM Vancomycin 17.3 mg/L  2022: 12:02 PM Vancomycin 11.2 mg/L    Vancomycin IV Administrations (past 72 hours)                   vancomycin (VANCOCIN) 1,250 mg in sodium chloride 0.9 % 250 mL intermittent infusion (mg) 1,250 mg New Bag 22 0447     1,250 mg New Bag 22 1645     1,250 mg New Bag  0346     1,250 mg New Bag 22 0347     1,250 mg New Bag 22 1719                Nephrotoxins and other renal medications (From now, onward)    Start     Dose/Rate Route Frequency Ordered Stop    22 1700  vancomycin (VANCOCIN) 1,500 mg in sodium chloride 0.9 % 250 mL intermittent infusion         1,500 mg  over 90 Minutes Intravenous EVERY 12 HOURS 22 1349               Contrast Orders - past 72 hours (72h ago, onward)            Start     Dose/Rate Route Frequency Ordered Stop    22 1500  perflutren diluted 1mL to 2mL with saline (OPTISON) diluted injection 5 mL         5 mL Intravenous ONCE 22 1444 22 1444           Interpretation of levels and current regimen:  Vancomycin level is reflective of -600    Has serum creatinine changed greater than 50% in last 72 hours: No    Urine output:  unable to determine    Renal Function: Stable    InsightRX Prediction of Planned New Vancomycin Regimen  Loading dose: N/A  Regimen: 1500 mg IV every 12 hours.  Start time: 16:47 on 02/14/2022  Exposure target: AUC24 (range)400-600 mg/L.hr   AUC24,ss: 506 mg/L.hr  Probability of AUC24 > 400: 95 %  Ctrough,ss: 13.1 mg/L  Probability of Ctrough,ss > 20: 4 %  Probability of nephrotoxicity (Lodise VU 2009): 8 %      Plan:  1. Increase Dose from 1250 mg to 1500 mg IV q12h since Probability of AUC24 > 400 is 65 % for dose of 1250 mg IV q12h  2. Vancomycin monitoring method: AUC  3. Vancomycin therapeutic monitoring goal: 400-600 mg*h/L  4. Pharmacy will check vancomycin levels as appropriate in 1-3 Days.  5. Serum creatinine levels will be ordered a minimum of twice weekly.    Amberly Vang [facility]

## 2022-02-14 NOTE — PROGRESS NOTES
Pt was seen, course reviewed with team    Pt notes fair pain control  Denies cough, chest pain, SOB  Denies significant difficulty swallowing  Last BM 2/11      Intermittent low grade T  BP 130s/  HR 120s  02 sats low to mid 90s RA    Sleepy, easily alerts, appears mod uncomfortable  RR 12  In NAD  Lungs clear  No stridor or wheezes  CV rrr  Abd soft, non-disteneded  No LE edema    Results for SONYA CALERO (MRN [id number]) as of 2/14/2022 11:22   Ref. Range 2/14/2022 06:42   Sodium Latest Ref Range: 133 - 144 mmol/L 135   Potassium Latest Ref Range: 3.4 - 5.3 mmol/L 4.4   Chloride Latest Ref Range: 94 - 109 mmol/L 104   Carbon Dioxide Latest Ref Range: 20 - 32 mmol/L 27   Urea Nitrogen Latest Ref Range: 7 - 30 mg/dL 7   Creatinine Latest Ref Range: 0.66 - 1.25 mg/dL 0.69   GFR Estimate Latest Ref Range: >60 mL/min/1.73m2 >90   Calcium Latest Ref Range: 8.5 - 10.1 mg/dL 9.3   Anion Gap Latest Ref Range: 3 - 14 mmol/L 4   Glucose Latest Ref Range: 70 - 99 mg/dL 129 (H)   WBC Latest Ref Range: 4.0 - 11.0 10e3/uL 12.5 (H)   Hemoglobin Latest Ref Range: 13.3 - 17.7 g/dL 9.1 (L)   Hematocrit Latest Ref Range: 40.0 - 53.0 % 29.6 (L)   Platelet Count Latest Ref Range: 150 - 450 10e3/uL 581 (H)   RBC Count Latest Ref Range: 4.40 - 5.90 10e6/uL 3.37 (L)   MCV Latest Ref Range: 78 - 100 fL 88   MCH Latest Ref Range: 26.5 - 33.0 pg 27.0   MCHC Latest Ref Range: 31.5 - 36.5 g/dL 30.7 (L)   RDW Latest Ref Range: 10.0 - 15.0 % 12.8     BC 2/11 + micrococcus (1/2)  BC 2/10  + proprionibcaterium  BC 2/9    + proprionibacterium    Tissue cultures + for Staph aureus 2/9          Assessment: Sonya Calero is a 51 year old male with epidural abscess and discitis s/p the following procedures w/ Dr. Calixto:     1. ACDF C5-6,C7-T1, T1-T2 on 2/9  2. PISF C5-T2 w/ laminectomies at C5-6, SPO C5-6. C6-7 on 2/12     Cultures as above  Primary source unclear  Pt remains on Ancef and Vancomycin. Per ID  Plan PREM  ordered  PICC line when ok with ID    Sinus tachycardia, likely physiologic, ie pain, infection and anemia  Plan monitor vitals, pain control    Anemia  Secondary to surgeries, infection, stable  Plan monitor    DVT proph mechanical

## 2022-02-14 NOTE — PLAN OF CARE
"/69 (BP Location: Left arm, Patient Position: Supine)   Pulse (!) 125   Temp 99.9  F (37.7  C)   Resp 16   Ht 1.753 m (5' 9\")   Wt 78.9 kg (174 lb)   SpO2 92%   BMI 25.70 kg/m     HR - 125 - provider aware and temperature 99.9.     Pt A&Ox4. Pt has anterior cervical and posterior incisions - CDI. Pt has a [place] J on - worn at all times. Pt has a hemovac - CDI. Pt rated pain 8/10 in neck and back - received oxycodone, robaxin, atorax, and ice packs for pain. Pt was IND in the room but was unsteady and educated to call before getting up. Pt is on pulse ox. And placed on 2L of oxygen via NC due to SpO2 dropping. SpO2 in mid to high 90s on 2L. Both PIVs in left arm removed. Pt has a rt PIV.     "

## 2022-02-15 ENCOUNTER — ANESTHESIA EVENT (OUTPATIENT)
Dept: SURGERY | Facility: CLINIC | Age: 52
DRG: 453 | End: 2022-02-15
Payer: COMMERCIAL

## 2022-02-15 ENCOUNTER — APPOINTMENT (OUTPATIENT)
Dept: CARDIOLOGY | Facility: CLINIC | Age: 52
DRG: 453 | End: 2022-02-15
Attending: INTERNAL MEDICINE
Payer: COMMERCIAL

## 2022-02-15 ENCOUNTER — APPOINTMENT (OUTPATIENT)
Dept: PHYSICAL THERAPY | Facility: CLINIC | Age: 52
DRG: 453 | End: 2022-02-15
Payer: COMMERCIAL

## 2022-02-15 ENCOUNTER — ANESTHESIA (OUTPATIENT)
Dept: SURGERY | Facility: CLINIC | Age: 52
DRG: 453 | End: 2022-02-15
Payer: COMMERCIAL

## 2022-02-15 LAB
ATRIAL RATE - MUSE: 114 BPM
BACTERIA BLD CULT: NO GROWTH
BACTERIA BLD CULT: NO GROWTH
DIASTOLIC BLOOD PRESSURE - MUSE: NORMAL MMHG
GLUCOSE BLDC GLUCOMTR-MCNC: 90 MG/DL (ref 70–99)
HOLD SPECIMEN: NORMAL
HOLD SPECIMEN: NORMAL
INTERPRETATION ECG - MUSE: NORMAL
LVEF ECHO: NORMAL
P AXIS - MUSE: 56 DEGREES
PR INTERVAL - MUSE: 182 MS
QRS DURATION - MUSE: 84 MS
QT - MUSE: 302 MS
QTC - MUSE: 416 MS
R AXIS - MUSE: 7 DEGREES
SYSTOLIC BLOOD PRESSURE - MUSE: NORMAL MMHG
T AXIS - MUSE: 56 DEGREES
VENTRICULAR RATE- MUSE: 114 BPM

## 2022-02-15 PROCEDURE — 76376 3D RENDER W/INTRP POSTPROCES: CPT | Mod: 26 | Performed by: INTERNAL MEDICINE

## 2022-02-15 PROCEDURE — 250N000013 HC RX MED GY IP 250 OP 250 PS 637: Performed by: STUDENT IN AN ORGANIZED HEALTH CARE EDUCATION/TRAINING PROGRAM

## 2022-02-15 PROCEDURE — 258N000003 HC RX IP 258 OP 636: Performed by: STUDENT IN AN ORGANIZED HEALTH CARE EDUCATION/TRAINING PROGRAM

## 2022-02-15 PROCEDURE — 250N000013 HC RX MED GY IP 250 OP 250 PS 637: Performed by: PHYSICIAN ASSISTANT

## 2022-02-15 PROCEDURE — 97530 THERAPEUTIC ACTIVITIES: CPT | Mod: GP | Performed by: PHYSICAL THERAPIST

## 2022-02-15 PROCEDURE — 250N000025 HC SEVOFLURANE, PER MIN

## 2022-02-15 PROCEDURE — 250N000011 HC RX IP 250 OP 636: Performed by: INTERNAL MEDICINE

## 2022-02-15 PROCEDURE — 370N000017 HC ANESTHESIA TECHNICAL FEE, PER MIN

## 2022-02-15 PROCEDURE — 710N000010 HC RECOVERY PHASE 1, LEVEL 2, PER MIN

## 2022-02-15 PROCEDURE — 258N000003 HC RX IP 258 OP 636: Performed by: ORTHOPAEDIC SURGERY

## 2022-02-15 PROCEDURE — B24BZZ4 ULTRASONOGRAPHY OF HEART WITH AORTA, TRANSESOPHAGEAL: ICD-10-PCS | Performed by: INTERNAL MEDICINE

## 2022-02-15 PROCEDURE — 97116 GAIT TRAINING THERAPY: CPT | Mod: GP | Performed by: PHYSICAL THERAPIST

## 2022-02-15 PROCEDURE — 258N000003 HC RX IP 258 OP 636: Performed by: ANESTHESIOLOGY

## 2022-02-15 PROCEDURE — 250N000011 HC RX IP 250 OP 636: Performed by: STUDENT IN AN ORGANIZED HEALTH CARE EDUCATION/TRAINING PROGRAM

## 2022-02-15 PROCEDURE — 250N000013 HC RX MED GY IP 250 OP 250 PS 637: Performed by: CLINICAL NURSE SPECIALIST

## 2022-02-15 PROCEDURE — 99207 PR NO CHARGE LOS: CPT | Performed by: STUDENT IN AN ORGANIZED HEALTH CARE EDUCATION/TRAINING PROGRAM

## 2022-02-15 PROCEDURE — 250N000011 HC RX IP 250 OP 636: Performed by: ANESTHESIOLOGY

## 2022-02-15 PROCEDURE — 999N000141 HC STATISTIC PRE-PROCEDURE NURSING ASSESSMENT

## 2022-02-15 PROCEDURE — 93312 ECHO TRANSESOPHAGEAL: CPT | Mod: 26 | Performed by: INTERNAL MEDICINE

## 2022-02-15 PROCEDURE — 250N000009 HC RX 250: Performed by: STUDENT IN AN ORGANIZED HEALTH CARE EDUCATION/TRAINING PROGRAM

## 2022-02-15 PROCEDURE — 250N000011 HC RX IP 250 OP 636: Performed by: ORTHOPAEDIC SURGERY

## 2022-02-15 PROCEDURE — 120N000002 HC R&B MED SURG/OB UMMC

## 2022-02-15 PROCEDURE — 93325 DOPPLER ECHO COLOR FLOW MAPG: CPT

## 2022-02-15 PROCEDURE — 99233 SBSQ HOSP IP/OBS HIGH 50: CPT | Performed by: INTERNAL MEDICINE

## 2022-02-15 PROCEDURE — 93325 DOPPLER ECHO COLOR FLOW MAPG: CPT | Mod: 26 | Performed by: INTERNAL MEDICINE

## 2022-02-15 PROCEDURE — 93320 DOPPLER ECHO COMPLETE: CPT | Mod: 26 | Performed by: INTERNAL MEDICINE

## 2022-02-15 RX ORDER — SODIUM CHLORIDE, SODIUM LACTATE, POTASSIUM CHLORIDE, CALCIUM CHLORIDE 600; 310; 30; 20 MG/100ML; MG/100ML; MG/100ML; MG/100ML
INJECTION, SOLUTION INTRAVENOUS CONTINUOUS PRN
Status: DISCONTINUED | OUTPATIENT
Start: 2022-02-15 | End: 2022-02-15

## 2022-02-15 RX ORDER — PROPOFOL 10 MG/ML
INJECTION, EMULSION INTRAVENOUS PRN
Status: DISCONTINUED | OUTPATIENT
Start: 2022-02-15 | End: 2022-02-15

## 2022-02-15 RX ORDER — LIDOCAINE HYDROCHLORIDE 20 MG/ML
INJECTION, SOLUTION INFILTRATION; PERINEURAL PRN
Status: DISCONTINUED | OUTPATIENT
Start: 2022-02-15 | End: 2022-02-15

## 2022-02-15 RX ORDER — HYDROMORPHONE HYDROCHLORIDE 1 MG/ML
.2-.4 INJECTION, SOLUTION INTRAMUSCULAR; INTRAVENOUS; SUBCUTANEOUS EVERY 5 MIN PRN
Status: DISCONTINUED | OUTPATIENT
Start: 2022-02-15 | End: 2022-02-15 | Stop reason: HOSPADM

## 2022-02-15 RX ORDER — DEXAMETHASONE SODIUM PHOSPHATE 4 MG/ML
INJECTION, SOLUTION INTRA-ARTICULAR; INTRALESIONAL; INTRAMUSCULAR; INTRAVENOUS; SOFT TISSUE PRN
Status: DISCONTINUED | OUTPATIENT
Start: 2022-02-15 | End: 2022-02-15

## 2022-02-15 RX ORDER — ONDANSETRON 2 MG/ML
INJECTION INTRAMUSCULAR; INTRAVENOUS PRN
Status: DISCONTINUED | OUTPATIENT
Start: 2022-02-15 | End: 2022-02-15

## 2022-02-15 RX ORDER — LIDOCAINE 40 MG/G
CREAM TOPICAL
Status: DISCONTINUED | OUTPATIENT
Start: 2022-02-15 | End: 2022-02-16 | Stop reason: HOSPADM

## 2022-02-15 RX ADMIN — PROPOFOL 40 MG: 10 INJECTION, EMULSION INTRAVENOUS at 13:57

## 2022-02-15 RX ADMIN — HYDROMORPHONE HYDROCHLORIDE 0.2 MG: 1 INJECTION, SOLUTION INTRAMUSCULAR; INTRAVENOUS; SUBCUTANEOUS at 13:12

## 2022-02-15 RX ADMIN — SENNOSIDES AND DOCUSATE SODIUM 1 TABLET: 50; 8.6 TABLET ORAL at 19:51

## 2022-02-15 RX ADMIN — OXYCODONE HYDROCHLORIDE 10 MG: 10 TABLET ORAL at 19:52

## 2022-02-15 RX ADMIN — LIDOCAINE HYDROCHLORIDE 100 MG: 20 INJECTION, SOLUTION INFILTRATION; PERINEURAL at 13:40

## 2022-02-15 RX ADMIN — PHENYLEPHRINE HYDROCHLORIDE 100 MCG: 10 INJECTION INTRAVENOUS at 13:56

## 2022-02-15 RX ADMIN — GABAPENTIN 300 MG: 300 CAPSULE ORAL at 08:58

## 2022-02-15 RX ADMIN — SODIUM CHLORIDE, POTASSIUM CHLORIDE, SODIUM LACTATE AND CALCIUM CHLORIDE: 600; 310; 30; 20 INJECTION, SOLUTION INTRAVENOUS at 13:33

## 2022-02-15 RX ADMIN — Medication 50 MCG: at 08:59

## 2022-02-15 RX ADMIN — OXYCODONE HYDROCHLORIDE 10 MG: 10 TABLET ORAL at 01:27

## 2022-02-15 RX ADMIN — OXYCODONE HYDROCHLORIDE 10 MG: 10 TABLET ORAL at 14:56

## 2022-02-15 RX ADMIN — FAMOTIDINE 20 MG: 20 TABLET, FILM COATED ORAL at 08:59

## 2022-02-15 RX ADMIN — SENNOSIDES AND DOCUSATE SODIUM 1 TABLET: 50; 8.6 TABLET ORAL at 08:58

## 2022-02-15 RX ADMIN — MIDAZOLAM 2 MG: 1 INJECTION INTRAMUSCULAR; INTRAVENOUS at 13:28

## 2022-02-15 RX ADMIN — OXYCODONE HYDROCHLORIDE 10 MG: 10 TABLET ORAL at 05:10

## 2022-02-15 RX ADMIN — HYDROXYZINE HYDROCHLORIDE 50 MG: 50 TABLET, FILM COATED ORAL at 19:52

## 2022-02-15 RX ADMIN — Medication 2 G: at 18:36

## 2022-02-15 RX ADMIN — ONDANSETRON 4 MG: 2 INJECTION INTRAMUSCULAR; INTRAVENOUS at 13:48

## 2022-02-15 RX ADMIN — PHENYLEPHRINE HYDROCHLORIDE 100 MCG: 10 INJECTION INTRAVENOUS at 13:50

## 2022-02-15 RX ADMIN — Medication 2 G: at 01:39

## 2022-02-15 RX ADMIN — POLYETHYLENE GLYCOL 3350 17 G: 17 POWDER, FOR SOLUTION ORAL at 08:58

## 2022-02-15 RX ADMIN — METHOCARBAMOL 750 MG: 750 TABLET ORAL at 03:16

## 2022-02-15 RX ADMIN — ACETAMINOPHEN 975 MG: 325 TABLET, FILM COATED ORAL at 14:57

## 2022-02-15 RX ADMIN — HYDROXYZINE HYDROCHLORIDE 50 MG: 50 TABLET, FILM COATED ORAL at 05:10

## 2022-02-15 RX ADMIN — OXYCODONE HYDROCHLORIDE 10 MG: 10 TABLET ORAL at 08:57

## 2022-02-15 RX ADMIN — PROPOFOL 150 MG: 10 INJECTION, EMULSION INTRAVENOUS at 13:40

## 2022-02-15 RX ADMIN — VANCOMYCIN HYDROCHLORIDE 1500 MG: 1 INJECTION, POWDER, LYOPHILIZED, FOR SOLUTION INTRAVENOUS at 19:49

## 2022-02-15 RX ADMIN — VANCOMYCIN HYDROCHLORIDE 1500 MG: 1 INJECTION, POWDER, LYOPHILIZED, FOR SOLUTION INTRAVENOUS at 05:15

## 2022-02-15 RX ADMIN — LIDOCAINE PATCH 4% 1 PATCH: 40 PATCH TOPICAL at 08:57

## 2022-02-15 RX ADMIN — Medication 2 G: at 08:57

## 2022-02-15 RX ADMIN — METHOCARBAMOL 750 MG: 750 TABLET ORAL at 08:57

## 2022-02-15 RX ADMIN — FLUOXETINE 20 MG: 20 CAPSULE ORAL at 08:59

## 2022-02-15 RX ADMIN — FAMOTIDINE 20 MG: 20 TABLET, FILM COATED ORAL at 19:51

## 2022-02-15 RX ADMIN — ACETAMINOPHEN 975 MG: 325 TABLET, FILM COATED ORAL at 05:10

## 2022-02-15 RX ADMIN — Medication 60 MG: at 13:40

## 2022-02-15 RX ADMIN — GABAPENTIN 300 MG: 300 CAPSULE ORAL at 19:51

## 2022-02-15 RX ADMIN — DEXAMETHASONE SODIUM PHOSPHATE 8 MG: 4 INJECTION, SOLUTION INTRA-ARTICULAR; INTRALESIONAL; INTRAMUSCULAR; INTRAVENOUS; SOFT TISSUE at 13:48

## 2022-02-15 ASSESSMENT — ACTIVITIES OF DAILY LIVING (ADL)
ADLS_ACUITY_SCORE: 5
ADLS_ACUITY_SCORE: 3
ADLS_ACUITY_SCORE: 5

## 2022-02-15 NOTE — PROGRESS NOTES
"  Orthopaedic Surgery Daily Progress Note     Subjective: NAEON. Pain controlled on current pain regimen, using oral pain medications. Right forearm numbness unchanged. Voiding spontaneously. Passing flatus and having BM. All questions answered this am.    Physical Exam   /80 (BP Location: Left arm, Patient Position: Supine)   Pulse 97   Temp 98.2  F (36.8  C) (Oral)   Resp 18   Ht 1.753 m (5' 9\")   Wt 78.9 kg (174 lb)   SpO2 98%   BMI 25.70 kg/m      Intake/Output Summary (Last 24 hours) at 2/11/2022 0634  Last data filed at 2/10/2022 2200  Gross per 24 hour   Intake 240 ml   Output 1035 ml   Net -795 ml     General: Alert, well-appearing in no acute distress.  Respiratory: Non-labored breathing.   Cardiovascular: Extremities warm and well perfused  Extremities: moving all four extremities.   MSK:    -[place] J collar in place  -Anterior and posterior spine dressings c/d/i    Cervical spine:    Motor -     C5: Deltoids R 5/5 and L 5/5 strength    C6: Biceps R 5/5 and L 5/5 strength     C7: Triceps R 5/5 and L 5/5 strength     C8:  R 5/5 and L 5/5 strength     T1: Dorsal interossei R 4/5 and L 4/5 strength        Sensation: intact to light touch in C5-T1 - slightly diminished right T1    Anterior drains x2 removed 2/13.  Posterior neck drain: removed 2/14    Labs    Complete Blood Count   Recent Labs   Lab 02/14/22  0642 02/13/22  0634 02/12/22  1153 02/12/22  0956 02/12/22  0540 02/11/22  0540 02/10/22  0544 02/09/22  1438   WBC 12.5* 11.9*  --   --   --  11.2*  --  13.9*   HGB 9.1* 8.2* 8.4* 9.3*   < > 9.1*   < > 12.1*   * 527*  --   --   --  588*  --  733*    < > = values in this interval not displayed.     Basic Metabolic Panel  Recent Labs   Lab 02/14/22  0642 02/13/22  0634 02/12/22  1153 02/12/22  0956 02/12/22  0646 02/11/22  0540 02/10/22  0916 02/10/22  0539    136 138 138  --  137  --  136   POTASSIUM 4.4 3.9 3.8 3.8  --  3.8   < > 5.9*   CHLORIDE 104 104  --   --   --  104  -- "  108   CO2 27 28  --   --   --  28  --  26   BUN 7 7  --   --   --  10  --  9   CR 0.69 0.66  --   --   --  0.69  --  0.70   *  128* 130*  130* 117* 127*   < > 110*  --  433*    < > = values in this interval not displayed.     Coagulation Profile  Recent Labs   Lab 02/09/22  1438   INR 0.98     Assessment/Plan:  Assessment: Dave Calero is a 51 year old male with epidural abscess and discitis s/p the following procedures w/ Dr. Calixto:    1. ACDF C5-6,C7-T1, T1-T2 on 2/9  2. PISF C5-T2 w/ laminectomies at C5-6, SPO C5-6. C6-7 on 2/12     Cultures with MSSA and Granulicatella adjacens. ID currently recommending on IV ancef and vancomycin, pending PREM results.    Plan for today:  - PREM this morning  - Will talk to ID regarding discharge planning with PICC line and antibiotics for 8 weeks.  - Anticipate discharge today or tomorrow    Plan:  Orthopedics Primary  Activity: Up with assist until independent. No excessive bending or twisting. No lifting >10 lbs x 6 weeks. No Scar lift for transfers.   Weight bearing status: WBAT.  Pain management: Transition from IV to PO as tolerated.    Antibiotics: ancef and vanco per ID  Diet: regular diet  DVT prophylaxis: SCDs only. No chemical DVT ppx needed at discharge.  Labs: Hgb POD#1.  Bracing/Splinting: [place] J in place. Can remove for cares  Dressings: Keep dressing CDI.   Imaging: Upright Cervical Xrays - completed.  Drains: Removed.  Physical Therapy/Occupational Therapy: Eval and treat.  Cultures: Pending, follow culture results closely.    Consults: IM,ID, PT, OT, orthotics.  Follow-up: In clinic with Dr. Calixto  Disposition: Pending progress with therapies, pain control on orals, and medical stability, anticipate discharge to home on POD3-5    Patient discussed with Dr. Calixto.     Kai Butterfield MD  PGY-1  Orthopaedic Surgery

## 2022-02-15 NOTE — PROGRESS NOTES
RN RECOVERY NOTE   Assigned to take over pt care while pt in PACU post procedure   Report @ pt bedside in PACU from SHAHANA Sanz RN @ 1600  Informed pt on hold. Transport to arrive @ 1630.  Informed report has already been provided to [facility] RN.   Pt lying on cart. C-Collar in place. Pt states it is for comfort only. Pt is alert and oriented, following commands, answering questions appropriately and able to move all extremities.  Appears comfortable no reports of pain verbalized at this time.   PIV saline locked.   Ximena with EMT provided report @ pt bedside @ 1630. EMS sheet and face sheet provided to EMT. Questions encouraged and answered.   Pt out of phase 1 -PACU back to inpatient unit on [facility] transported via EMT

## 2022-02-15 NOTE — PROGRESS NOTES
[facility]    Medicine Progress Note - Hospitalist Service, GOLD TEAM 17    Date of Admission:  2/9/2022    Assessment & Plan      Dave Calero is a healthy 51-year-old male. He developed neck pain and pain between his shoulder blades around 1/3/22.  Had CT  Without major concerns, then developed sensation of pill getting stuck  In throat and had decreased sensation in arms,  Was seen by orthopedic surgery  2/8  And MRI on 2/9 showed cervical discitis/ epidural abscess  Thus was referred to  [facility]  ER for further evaluation.   Patient  Was admitted by Dr Calixto  And underwent surgery x 2 . Antibiotics as outlined bellow  by ID.         Cervical discitis, epidural  abscess   -Status post 1. ACDF C5-6,C7-T1, T1-T2 on 2/9  2. PISF C5-T2 w/ laminectomies at C5-6, SPO C5-6. C6-7 on 2/12  -Seen by ID continue IV vancomycin  And cefazolin  , adjust depending on PREM results   PREM done  2/15 awaiting results  ( check with orthopedic surgery  In AM ands was ok to proceed with PREM after cervical surgery )   - intraop culture grew MSSA and Cutibacterium acnes ( propionibacterium)   - MSSA bacteremia  And also grew Granulicatella form blood culture 2/9    - 1/2 blood culture form 2/11 micrococcus, likely contaminant   -OK for PICC per ID   - will need indefinite chronic suppressive antibiotics therapy   - ? Source of infection , no history IV drug use, issues with tehth   - CRP 74 2/9   - CMP, CBC with diff CRP monitoring per ID     addendum  PREM 2/15 did not show vegetations   Per ID's rec continue vancomycin  + rifampin 600 mg po every day , both for 6 weeks  Followed by chronic suppressive therapy , currently still on vancomycin  And ancef     Sinus tachycardia  - likely physiologic, ie pain, infection and anemia  Plan monitor vitals, pain control     Anemia  -Secondary to surgeries, infection, stable  Plan monitor    Depression,  Anxiety ADHD   -back on fluoxetine  but at 20 mg, increase back to 30 mg if doing well on it            Diet: Snacks/Supplements Adult: Other; PRN supplements; Between Meals  NPO per Anesthesia Guidelines for Procedure/Surgery Except for: Meds    DVT Prophylaxis: per orthopedic surgery    Jerry Catheter: Not present  Central Lines: None  Cardiac Monitoring: None  Code Status: Full Code      Disposition Plan   Expected Discharge: several  Days, will need PICC line  and decision on IV antibiotics     The patient's care was discussed with the care team, orthopedic surgery  .    Kadie Garner MD  Hospitalist Service, GOLD TEAM [address] [place] [address]  Securely message with the Vocera Web Console (learn more here)  Text page via [employer] Paging/Directory   Please see signed in provider for up to date coverage information      Clinically Significant Risk Factors Present on Admission                    ______________________________________________________________________    Interval History      Was having lots of pain in AM in neck when I saw him, no chest pain  No shortness of breath  No nausea vomiting       Data reviewed today: I reviewed all medications, new labs and imaging results over the last 24 hours    Physical Exam   Vital Signs: Temp: 99.1  F (37.3  C) Temp src: Axillary BP: 131/76 Pulse: 113   Resp: 16 SpO2: 96 % O2 Device: Nasal cannula Oxygen Delivery: 2 LPM  Weight: 174 lbs 0 oz     General appearence: awake alert    NECK : with [place] collar on   RESPIRATORY: lungs clear to auscultation bilateral,  CARDIOVASCULAR:S1 S2 regular rate and rhythm, no rubs gallops or murmurs appreciated  GASTROINTESTINAL:soft, non-distended , non-tender , + bowel sounds, no masses felt   SKIN: warm and dry, no mottling noted   NEUROLOGIC; awake alert and oriented, no focal deficits found  EXTREMITIES: no clubbing, cyanosis or edema , moves all extremity, good pedal pulses   MUSCULOSKELETAL: without deformity   Data   Recent  Labs   Lab 02/15/22  1211 22  0642 22  0634 22  1153 22  0540 22  0540 22  2112 22  1438   WBC  --  12.5* 11.9*  --   --  11.2*  --  13.9*   HGB  --  9.1* 8.2* 8.4*   < > 9.1*   < > 12.1*   MCV  --  88 86  --   --  86  --  88   PLT  --  581* 527*  --   --  588*  --  733*   INR  --   --   --   --   --   --   --  0.98   NA  --  135 136 138   < > 137   < > 137   POTASSIUM  --  4.4 3.9 3.8   < > 3.8   < > 3.4   CHLORIDE  --  104 104  --   --  104   < > 102   CO2  --  27 28  --   --  28   < > 30   BUN  --  7 7  --   --  10   < > 12   CR  --  0.69 0.66  --   --  0.69   < > 0.83   ANIONGAP  --  4 4  --   --  5   < > 5   VIANEY  --  9.3 8.8  --   --  8.8   < > 9.8   GLC 90 129*  128* 130*  130* 117*   < > 110*   < > 130*   ALBUMIN  --   --   --   --   --   --   --  2.8*   PROTTOTAL  --   --   --   --   --   --   --  8.5   BILITOTAL  --   --   --   --   --   --   --  0.3   ALKPHOS  --   --   --   --   --   --   --  133   ALT  --   --   --   --   --   --   --  44   AST  --   --   --   --   --   --   --  12    < > = values in this interval not displayed.     Recent Results (from the past 24 hour(s))   Transesophageal Echocardiogram   Result Value    LVEF  60-65%    [facility]    [id number]  [id number]  [id number]  [id number]^[place]^CARLO^[facility]  Echocardiography Laboratory  [address] [place] [id number]     Name: SONYA ARAUJO  MRN: [id number]  : 1970  Study Date: 02/15/2022 01:42 PM  Age: 51 yrs  Gender: Male  Patient Location: [facility]  Reason For Study: 2nd degree AV Block, Endocarditis  Ordering Physician: CARLO GOFF  Performed By: Son Vale     Attestation  I was present during PREM probe placement by the fellow, Son Vale. I  personally viewed the imaging and agree with the interpretation and report as  documented by the  fellow.  ______________________________________________________________________________  Interpretation Summary  Normal biventricular function.  No valvular abnormalities or vegetations noted.  No pericardial effusion present.  ______________________________________________________________________________  Procedure  Intraoperative Transesophageal Echocardiogram with color and spectral Doppler  performed. 3D image acquisition, reconstruction, and real-time interpretation  was performed. Procedure location Operating Room. Informed consent for  Transesophegeal echo obtained. PREM Probe #63 was used during the procedure.  Sedation, endotracheal intubation, and mechanical ventilation were initiated  prior to the PREM and were monitored by anesthesia. The Transducer was inserted  without difficulty . Complications None. The patient tolerated the procedure  well. The patient's rhythm is sinus tachycardia. Adequate quality two-  dimensional was performed and interpreted. Adequate quality color and spectral  Doppler were performed and interpreted.     Left Ventricle  Left ventricular size is normal. Global and regional left ventricular function  is normal with an EF of 60-65%.     Right Ventricle  The right ventricle is normal size. Global right ventricular function is  normal.     Atria  Both atria appear normal. The left atrial appendage is normal. It is free of  spontaneous echo contrast and thrombus. No left atrial mass or thrombus  visualized. The atrial septum is intact as assessed by color Doppler .     Mitral Valve  The mitral valve is normal. Trace mitral insufficiency is present.     Aortic Valve  Aortic valve is normal in structure and function. The aortic valve is  tricuspid. No aortic regurgitation is present.     Tricuspid Valve  The tricuspid valve is normal. Trace tricuspid insufficiency is present.     Pulmonic Valve  The pulmonic valve is normal.     Vessels  The aorta root is normal.     Pericardium  No  pericardial effusion is present.     Miscellaneous  Transgastric imaging was not performed.     ______________________________________________________________________________  Report approved by: Catherine Meraz 02/15/2022 03:20 PM     ______________________________________________________________________________

## 2022-02-15 NOTE — PLAN OF CARE
Pt alert and Ox 4, VSS except for tachycardia, on RA.   NPO for the PREM today at 1015.   Taking AM medicines with water.   L PIV, SL.   [place] J collar on all time, Ok to remove for the PREM.  Anterior cervical dressing mild dried drainage. Skin under collar intact. Lidocaine patch on neck. C/o neck incisional pain, managed with PRN oxy 10 mg and Robaxin 750 mg.     Pt was picked up for PREM around 0900. Report given to transfer staff and the nurse on PREM.    Not come back yet when this writer's shift is done.

## 2022-02-15 NOTE — OR NURSING
Patient arrived from [place]. EMS dropped patient off without report. No nurse to nurse report prior to writer meeting patient.     Call made to UR unit 8A for nurse report. Unit number is [phone] to call back for report after procedure/anesthesia.    [facility] called and no ride has been set up for return yet. Scheduled picked up for 4:30pm (earliest available opening). Call [phone] for changes in return ride.

## 2022-02-15 NOTE — PLAN OF CARE
Physical Therapy Discharge Summary    Reason for therapy discharge:    All goals and outcomes met, no further needs identified.    Progress towards therapy goal(s). See goals on Care Plan in [facility] electronic health record for goal details.  Goals met    Therapy recommendation(s):    No further therapy is recommended.

## 2022-02-15 NOTE — ANESTHESIA POSTPROCEDURE EVALUATION
Patient: Dave Calero    Procedure: Procedure(s):  ECHOCARDIOGRAM, TRANSESOPHAGEAL (PREM), INTRAOPERATIVE       Diagnosis:Endocarditis [I38]  Diagnosis Additional Information: No value filed.    Anesthesia Type:  No value filed.    Note:  Disposition: Inpatient   Postop Pain Control: Uneventful            Sign Out: Well controlled pain   PONV: No   Neuro/Psych: Uneventful            Sign Out: Acceptable/Baseline neuro status   Airway/Respiratory: Uneventful            Sign Out: Acceptable/Baseline resp. status   CV/Hemodynamics: Uneventful            Sign Out: Acceptable CV status; No obvious hypovolemia; No obvious fluid overload   Other NRE: NONE   DID A NON-ROUTINE EVENT OCCUR? No    Event details/Postop Comments:  Hemodynamically stable, denies N/V, well controlled pain.             Last vitals:  Vitals Value Taken Time   /75 02/15/22 1450   Temp     Pulse 115 02/15/22 1452   Resp 16 02/15/22 1430   SpO2 93 % 02/15/22 1452   Vitals shown include unvalidated device data.    Electronically Signed By: Cristian Bennett MD  February 15, 2022  2:53 PM

## 2022-02-15 NOTE — PROGRESS NOTES
Cross Cover Note    Cross cover provider contacted by nursing as patient and patient's wife report he is on Fluoxetine 30mg daily which has not been restarted while here. Per chart review, no contraindication to resumption. He has been without it since 2/9. Will order at 20mg daily for now, and if tolerating, can increase to 30mg daily in the next few days.     Please contact Medicine provider on call overnight with any additional questions or concerns.     Pia Hernandez PA-C  Hospitalist Service  Pager [phone]

## 2022-02-15 NOTE — PRE-PROCEDURE
GENERAL PRE-PROCEDURE:   Procedure:  PREM  Date/Time:  2/15/2022 12:57 PM    Verbal consent obtained?: Yes    Written consent obtained?: Yes    Risks and benefits: Risks, benefits and alternatives were discussed    Consent given by:  Patient  Patient states understanding of procedure being performed: Yes    Patient's understanding of procedure matches consent: Yes    Procedure consent matches procedure scheduled: Yes    Appropriately NPO:  Yes  ASA Class:  3  : mi[place] j in place. difficult to assess.  Lungs:  Lungs clear with good breath sounds bilaterally  Heart:  Normal heart sounds and rate  History & Physical reviewed:  History and physical reviewed and no updates needed  Statement of review:  I have reviewed the lab findings, diagnostic data, medications, and the plan for sedation

## 2022-02-15 NOTE — PLAN OF CARE
"/80 (BP Location: Left arm, Patient Position: Supine)   Pulse 97   Temp 98.2  F (36.8  C) (Oral)   Resp 18   Ht 1.753 m (5' 9\")   Wt 78.9 kg (174 lb)   SpO2 98%   BMI 25.70 kg/m      Pt A&Ox4. Pt has anterior cervical and posterior incisions. Posterior incision has small amount of drainage present. Pt has a rt hip dressing - CDI. Pt has a [place] J on - to be worn at all times. Pt reported that PT informed him he can take the [place] J off if he is in bed and laying still. Informed oncoming nurse and told to verify with PT. Pt rated pain 5-10/10 in neck and back - received oxycodone, robaxin, atorax, tylenol, and ice packs for pain. Pt is IND in the room and calls appropriately for needs. Pt reported pain around IV site. IV removed and hot pack given. New PIV placed in L arm. Pt is NPO and is having an echo PREM done today. Transport is set up for 10:15.   "

## 2022-02-15 NOTE — PROGRESS NOTES
"Infectious Diseases Brief Note:    Chart reviewed, patient not seen due to being transferred this AM to [place] for PREM and being gone most of the day.    Recommendations:  1. Continue vancomycin and cefazolin  2. Possible outpatient antibiotic regimens:  -If PREM positive for endocarditis:  -Vancomycin (pharmacy to dose) + Rifampin 600mg qday PO + Ceftriaxone 2g q24h, all for 6 weeks, followed by chronic suppressive therapy  -If PREM negative:  -Vancomycin + Rifampin 600mg qDay PO, both for 6 weeks, followed by chronic suppressive therapy  -Rationale: If there are signs of endocarditis on PREM, one of the pathogens of concern would be Granulicatella, known to cause endocarditis. We would need to treat this aggressively, with ceftriaxone being a first line agent for this bacterium, hence the addition of this to the \"PREM positive\" regimen. If, however, the PREM is negative, we would be able to cover the MSSA, Cutibacterium, and the Granulicatella (though with a 2nd line agent) with Vancomycin alone. Rifampin is added to both regimens to help break-up biofilm on hardware.  2. Will await PREM and follow-up results  3. Anticipate at least 6 weeks of antibiotics likely followed by indefinite chronic suppressive therapy  4. OK for PICC, Micrococcus on 2/11 very likely just contaminant.    Will see patient in the morning, always available for questions/concerns prior to that if needed!    Deion Way MD  Division of Infectious Diseases and International Medicine  P: [phone]    "

## 2022-02-15 NOTE — ANESTHESIA CARE TRANSFER NOTE
Patient: Dave Calero    Procedure: Procedure(s):  ECHOCARDIOGRAM, TRANSESOPHAGEAL (PREM), INTRAOPERATIVE       Diagnosis: Endocarditis [I38]  Diagnosis Additional Information: No value filed.    Anesthesia Type:   No value filed.     Note:    Oropharynx: oropharynx clear of all foreign objects  Level of Consciousness: awake  Oxygen Supplementation: face mask    Independent Airway: airway patency satisfactory and stable  Dentition: dentition unchanged  Vital Signs Stable: post-procedure vital signs reviewed and stable  Report to RN Given: handoff report given  Patient transferred to: PACU    Handoff Report: Identifed the Patient, Identified the Reponsible Provider, Reviewed the pertinent medical history, Discussed the surgical course, Reviewed Intra-OP anesthesia mangement and issues during anesthesia, Set expectations for post-procedure period and Allowed opportunity for questions and acknowledgement of understanding      Vitals:  Vitals Value Taken Time   /87 02/15/22 1415   Temp     Pulse 120 02/15/22 1417   Resp     SpO2 92 % 02/15/22 1417   Vitals shown include unvalidated device data.    Electronically Signed By: KYLAH Bran CRNA  February 15, 2022  2:18 PM

## 2022-02-16 ENCOUNTER — HOME INFUSION (PRE-WILLOW HOME INFUSION) (OUTPATIENT)
Dept: PHARMACY | Facility: CLINIC | Age: 52
End: 2022-02-16

## 2022-02-16 VITALS
DIASTOLIC BLOOD PRESSURE: 68 MMHG | HEIGHT: 69 IN | OXYGEN SATURATION: 97 % | HEART RATE: 109 BPM | TEMPERATURE: 98 F | BODY MASS INDEX: 25.77 KG/M2 | WEIGHT: 174 LBS | RESPIRATION RATE: 16 BRPM | SYSTOLIC BLOOD PRESSURE: 117 MMHG

## 2022-02-16 LAB — BACTERIA BLD CULT: NO GROWTH

## 2022-02-16 PROCEDURE — 250N000013 HC RX MED GY IP 250 OP 250 PS 637: Performed by: CLINICAL NURSE SPECIALIST

## 2022-02-16 PROCEDURE — C1751 CATH, INF, PER/CENT/MIDLINE: HCPCS

## 2022-02-16 PROCEDURE — 250N000011 HC RX IP 250 OP 636: Performed by: INTERNAL MEDICINE

## 2022-02-16 PROCEDURE — 272N000020 HC KIT OPEN ENDED SINGLE LUMEN

## 2022-02-16 PROCEDURE — 250N000013 HC RX MED GY IP 250 OP 250 PS 637: Performed by: PHYSICIAN ASSISTANT

## 2022-02-16 PROCEDURE — 36569 INSJ PICC 5 YR+ W/O IMAGING: CPT

## 2022-02-16 PROCEDURE — 99207 PR CDG-MDM COMPONENT: MEETS LOW - DOWN CODED: CPT | Performed by: INTERNAL MEDICINE

## 2022-02-16 PROCEDURE — 258N000003 HC RX IP 258 OP 636: Performed by: ORTHOPAEDIC SURGERY

## 2022-02-16 PROCEDURE — 250N000013 HC RX MED GY IP 250 OP 250 PS 637: Performed by: STUDENT IN AN ORGANIZED HEALTH CARE EDUCATION/TRAINING PROGRAM

## 2022-02-16 PROCEDURE — 99207 PR NO CHARGE LOS: CPT | Performed by: STUDENT IN AN ORGANIZED HEALTH CARE EDUCATION/TRAINING PROGRAM

## 2022-02-16 PROCEDURE — 250N000011 HC RX IP 250 OP 636: Performed by: ORTHOPAEDIC SURGERY

## 2022-02-16 PROCEDURE — 99232 SBSQ HOSP IP/OBS MODERATE 35: CPT | Performed by: INTERNAL MEDICINE

## 2022-02-16 PROCEDURE — 272N000201 ZZ HC ADHESIVE SKIN CLOSURE, DERMABOND

## 2022-02-16 PROCEDURE — 999N000040 HC STATISTIC CONSULT NO CHARGE VASC ACCESS

## 2022-02-16 RX ORDER — POLYETHYLENE GLYCOL 3350 17 G/17G
17 POWDER, FOR SOLUTION ORAL DAILY
Qty: 7 PACKET | Refills: 0 | Status: SHIPPED | OUTPATIENT
Start: 2022-02-17 | End: 2022-10-11

## 2022-02-16 RX ORDER — AMOXICILLIN 250 MG
1 CAPSULE ORAL 2 TIMES DAILY
Qty: 30 TABLET | Refills: 0 | Status: SHIPPED | OUTPATIENT
Start: 2022-02-16 | End: 2022-10-11

## 2022-02-16 RX ORDER — DIAZEPAM 5 MG
5 TABLET ORAL EVERY 6 HOURS PRN
Qty: 30 TABLET | Refills: 0 | Status: SHIPPED | OUTPATIENT
Start: 2022-02-16 | End: 2022-10-11

## 2022-02-16 RX ORDER — HYDROXYZINE HYDROCHLORIDE 50 MG/1
50 TABLET, FILM COATED ORAL EVERY 6 HOURS PRN
Qty: 40 TABLET | Refills: 0 | Status: SHIPPED | OUTPATIENT
Start: 2022-02-16 | End: 2022-10-11

## 2022-02-16 RX ORDER — RIFAMPIN 300 MG/1
600 CAPSULE ORAL DAILY
Qty: 84 CAPSULE | Refills: 0 | Status: SHIPPED | OUTPATIENT
Start: 2022-02-16 | End: 2022-03-10

## 2022-02-16 RX ORDER — RIFAMPIN 300 MG/1
600 CAPSULE ORAL DAILY
Status: DISCONTINUED | OUTPATIENT
Start: 2022-02-16 | End: 2022-02-16 | Stop reason: HOSPADM

## 2022-02-16 RX ORDER — ACETAMINOPHEN 500 MG
500-1000 TABLET ORAL EVERY 6 HOURS PRN
Qty: 40 TABLET | Refills: 0 | Status: SHIPPED | OUTPATIENT
Start: 2022-02-16

## 2022-02-16 RX ORDER — OXYCODONE HYDROCHLORIDE 5 MG/1
5 TABLET ORAL EVERY 4 HOURS
Qty: 50 TABLET | Refills: 0 | Status: SHIPPED | OUTPATIENT
Start: 2022-02-16 | End: 2022-10-11

## 2022-02-16 RX ORDER — METHOCARBAMOL 750 MG/1
750 TABLET, FILM COATED ORAL 4 TIMES DAILY PRN
Qty: 50 TABLET | Refills: 0 | Status: SHIPPED | OUTPATIENT
Start: 2022-02-16 | End: 2022-10-11

## 2022-02-16 RX ADMIN — ACETAMINOPHEN 975 MG: 325 TABLET, FILM COATED ORAL at 07:53

## 2022-02-16 RX ADMIN — OXYCODONE HYDROCHLORIDE 10 MG: 10 TABLET ORAL at 08:43

## 2022-02-16 RX ADMIN — RIFAMPIN 600 MG: 300 CAPSULE ORAL at 10:11

## 2022-02-16 RX ADMIN — DIAZEPAM 5 MG: 5 TABLET ORAL at 13:52

## 2022-02-16 RX ADMIN — POLYETHYLENE GLYCOL 3350 17 G: 17 POWDER, FOR SOLUTION ORAL at 07:48

## 2022-02-16 RX ADMIN — METHOCARBAMOL 750 MG: 750 TABLET ORAL at 07:53

## 2022-02-16 RX ADMIN — HYDROXYZINE HYDROCHLORIDE 50 MG: 50 TABLET, FILM COATED ORAL at 08:43

## 2022-02-16 RX ADMIN — MENTHOL 1 PATCH: 205.5 PATCH TOPICAL at 01:52

## 2022-02-16 RX ADMIN — Medication 2 G: at 05:19

## 2022-02-16 RX ADMIN — VANCOMYCIN HYDROCHLORIDE 1500 MG: 1 INJECTION, POWDER, LYOPHILIZED, FOR SOLUTION INTRAVENOUS at 07:53

## 2022-02-16 RX ADMIN — Medication 50 MCG: at 07:48

## 2022-02-16 RX ADMIN — FLUOXETINE 20 MG: 20 CAPSULE ORAL at 07:48

## 2022-02-16 RX ADMIN — Medication 2 G: at 12:01

## 2022-02-16 RX ADMIN — SENNOSIDES AND DOCUSATE SODIUM 1 TABLET: 50; 8.6 TABLET ORAL at 07:48

## 2022-02-16 RX ADMIN — FAMOTIDINE 20 MG: 20 TABLET, FILM COATED ORAL at 07:48

## 2022-02-16 RX ADMIN — OXYCODONE HYDROCHLORIDE 10 MG: 10 TABLET ORAL at 12:29

## 2022-02-16 ASSESSMENT — ACTIVITIES OF DAILY LIVING (ADL)
ADLS_ACUITY_SCORE: 3

## 2022-02-16 NOTE — PLAN OF CARE
Pt alert and OX4, VSS, tachy, on RA during daytime.  IND. [place] J collar on all times. Skin under collar Intact.   LS clear. RUE numbness.   New single lumen PICC place at E this shift, patent.   Wife is here and hot the PICC infusing education at bedside.   Discharge medication reviewed with patient and his wife.   Discharged this PM around 1400.

## 2022-02-16 NOTE — PROGRESS NOTES
[facility], [facility]  Parenteral ANtibiotic Review at Departure from Acute Care [place]     Antimicrobial Stewardship Program - A joint venture between [facility] and  Physicians to optimize antibiotic management.  NOT a formal consult - Restricted Antimicrobial Review     Patient: Dave Calero  MRN: [id number]  Allergies: Patient has no known allergies.    Brief Summary: Dave Calero is a 51-year-old male with history of HLD, ADHD, and depression. He was admitted on 2/9/2022 for management of cervical discitis and epidural abscess seen on OSH MRI. Patient was initiated on empiric cefazolin and vancomycin and underwent I&D with anterior cervical diskectomy and fusion on 2/9 and posterior instrumented fusion on 2/12. Blood cultures from admission grew MSSA and Granulicatella adiacens with negative repeat blood cultures. Micrococcus species in 1/2 set of blood culture that grew on the 3rd day of incubation deemed to be a contaminant. Intraoperative cultures grew MSSA and Cutibacterium (Propionibacterium) acnes. PREM on 2/15 negative for valvular abnormalities or vegetations. Cefazolin was discontinued with a plan to complete at least 6 weeks of antibiotics likely followed by indefinite chronic suppressive therapy to be determined at ID follow up appointment.     Antimicrobial Dose/Route/Frequency Duration/Indication Start Date End Date   Vancomycin IV 1500 mg/IV /every 12 hours 6 weeks/bacteremia (discitis and epidural abscess) 2/11/2022 3/25/2022   Rifampin 600 mg/PO/every 24 hours 6 weeks/bacteremia (discitis and epidural abscess) 2/16/2022 3/25/2022     Laboratory Tests: CBC with Diff, BMP   Lab Monitoring Frequency: 1x week   Therapeutic Drug Monitoring: Vancomycin Trough (serum)   Therapeutic Drug Monitoring Frequency: 1x week   Miscellaneous Monitoring:  None      Line Type: PICC (Single Lumen placed on  2/16/22)    Reassess Line/Pull Line Date: 3/25/2022    First Dose Received in Controlled Setting: Yes    Designated Provider: Nazario Tapia MD     Follow-up: Patient does  have follow-up. Appointment date/time: 3/10/2022 @ 2:30 PM.    Recommendations/Additional Information:   Current regimen of vancomycin 1500 mg IV every 12 hours is expected to achieve AUC ~500 mg/L.hr and trough of 13-14 mg/L at steady state  Monitor vancomycin level more closely with changes in renal function or clinical status and initiation of nephrotoxic  agent     Kaci Tinoco, PharmD, BCIDP  Pager: [phone]

## 2022-02-16 NOTE — CONSULTS
LUE PICC placed. Tip confirmation in the cavoatrial junction per 3CG technology. PICC okay to use.

## 2022-02-16 NOTE — PROGRESS NOTES
"  Orthopaedic Surgery Daily Progress Note     Subjective: NAEON. Pain controlled on current pain regimen, using oral pain medications. Right forearm numbness improving. Voiding spontaneously. Passing flatus and having BM. All questions answered this am.    Physical Exam   /78 (BP Location: Left arm, Patient Position: Semi-Salas's)   Pulse 110   Temp 97.4  F (36.3  C) (Oral)   Resp 16   Ht 1.753 m (5' 9\")   Wt 78.9 kg (174 lb)   SpO2 96%   BMI 25.70 kg/m      Intake/Output Summary (Last 24 hours) at 2/11/2022 0634  Last data filed at 2/10/2022 2200  Gross per 24 hour   Intake 240 ml   Output 1035 ml   Net -795 ml     General: Alert, well-appearing in no acute distress.  Respiratory: Non-labored breathing.   Cardiovascular: Extremities warm and well perfused  Extremities: moving all four extremities.   MSK:    -[place] J collar in place  -Anterior and posterior spine dressings c/d/i    Cervical spine:    Motor -     C5: Deltoids R 5/5 and L 5/5 strength    C6: Biceps R 5/5 and L 5/5 strength     C7: Triceps R 5/5 and L 5/5 strength     C8:  R 5/5 and L 5/5 strength     T1: Dorsal interossei R 4/5 and L 4/5 strength        Sensation: intact to light touch in C5-T1 - slightly diminished right T1    Anterior drains x2 removed 2/13.  Posterior neck drain: removed 2/14    Labs    Complete Blood Count   Recent Labs   Lab 02/14/22  0642 02/13/22  0634 02/12/22  1153 02/12/22  0956 02/12/22  0540 02/11/22  0540 02/10/22  0544 02/09/22  1438   WBC 12.5* 11.9*  --   --   --  11.2*  --  13.9*   HGB 9.1* 8.2* 8.4* 9.3*   < > 9.1*   < > 12.1*   * 527*  --   --   --  588*  --  733*    < > = values in this interval not displayed.     Basic Metabolic Panel  Recent Labs   Lab 02/15/22  1211 02/14/22  0642 02/13/22  0634 02/12/22  1153 02/12/22  0956 02/12/22  0646 02/11/22  0540 02/10/22  0916 02/10/22  0539   NA  --  135 136 138 138  --  137  --  136   POTASSIUM  --  4.4 3.9 3.8 3.8  --  3.8   < > 5.9* "   CHLORIDE  --  104 104  --   --   --  104  --  108   CO2  --  27 28  --   --   --  28  --  26   BUN  --  7 7  --   --   --  10  --  9   CR  --  0.69 0.66  --   --   --  0.69  --  0.70   GLC 90 129*  128* 130*  130* 117* 127*   < > 110*  --  433*    < > = values in this interval not displayed.     Coagulation Profile  Recent Labs   Lab 02/09/22  1438   INR 0.98     Assessment/Plan:  Assessment: Dave Calero is a 51 year old male with epidural abscess and discitis s/p the following procedures w/ Dr. Calixto:    1. ACDF C5-6,C7-T1, T1-T2 on 2/9  2. PISF C5-T2 w/ laminectomies at C5-6, SPO C5-6. C6-7 on 2/12     Cultures growing MSSA, Cutibacterium, and the Granulicatella. PREM negative for vegetations.    ID recommendations of Vancomycin + Rifampin 600mg qDay PO, both for 6 weeks, followed by chronic suppressive therapy.     Plan for today:  - PICC line  - Anticipate discharge today    Plan:  Orthopedics Primary  Activity: Up with assist until independent. No excessive bending or twisting. No lifting >10 lbs x 6 weeks. No Scar lift for transfers.   Weight bearing status: WBAT.  Pain management: Transition from IV to PO as tolerated.    Antibiotics: ancef and vanco per ID. Will adjust today for discharge antibiotics.  Diet: regular diet  DVT prophylaxis: SCDs only. No chemical DVT ppx needed at discharge.  Labs: Hgb POD#1.  Bracing/Splinting: [place] J in place. Can remove for cares  Dressings: Keep dressing CDI.   Imaging: Upright Cervical Xrays - completed.  Drains: Removed.  Physical Therapy/Occupational Therapy: Eval and treat.   Consults: IM, ID, PT, OT, orthotics.  Follow-up: In clinic with Dr. Calixto  Disposition: Pending progress with therapies, pain control on orals, and medical stability, anticipate discharge to home on POD3-5    Patient discussed with Dr. Calixto.     Kai Butterfield MD  PGY-1  Orthopaedic Surgery

## 2022-02-16 NOTE — PROGRESS NOTES
General ID Service: Follow Up Note      Patient:  Dave Calero, Date of birth 1970, Medical record number [id number]  Date of Visit:  February 14, 2022         Assessment and Recommendations:   ID Problem List:  1. Cervical discitis with epidural abscess  -s/p ACDF (2/9) and posterior instrmentation (2/10)  -Intraop cultures with MSSA, Cutibacterium  2. Granulicatella and MSSA bacteremia    Recommendations:  1. Continue vancomycin (dosed per pharmacy) and rifampin 600mg qDay  -PREM negative for vegetations, so vancomycin should be enough to cover MSSA, Cutibacterium, and Granulicatella. Rifampin will help break up any biofilm that may have formedon hardware.  2. Anticipate at least 6 weeks of antibiotics likely followed by indefinite chronic suppressive therapy  3. I have messaged our clinic coordinators to arrange ID clinic follow-up before 6 week martin.  4. OPAT details at the bottom of this note.    Discussion:  50yo M anitted with cervical discitis and epidural abscess, now s/p ACDF (2/9) and posterior instrumentation (2/10) with intraoperative cultures growing MSSA and Cutibacterium. Also with bacteremia Granulicatella and MSSA).  With negative PREM, I feel we can adequately treat for possible hardware infection and bacteremia with Vancomycin + Rifampin to cover MSSA, Granulicatella, and Cutibacterium. Anticipate at least 6 weeks of therapy followed by chronic suppressive regimen.    ID will sign off at this time, but please feel free to call with any questions!    Deion Way MD  Division of Infectious Diseases and International Medicine  P: [phone]        Interval History:     Patient discharged, not seen this morning.        HPI:     Mr. Dave Calero is a 51 year old male with PMHx of HLD, ADHD, depression who was admitted on 2/9/22 with cervical discitis. Patient developed pain in the base of his neck and between his shoulder blades beginning on 1/3/22.  CTs of his  thoracic and lumbar spine without concern at that time. However,  The pain persistent and he developed  sensation of a pill stuck in his throat and reports altered sensation to the ulnar aspects of his forearms bilaterally. Patient was subsequently seen at [facility] on 2/8/22.  MRI was obtained and results returned on 2/9/22 concerning for cervical discitis/epidural abscess (MRI not available in the system). Due to this he was referred to the [facility] emergency department on 2/9/22 for further evaluation and treatment. Patient had anterior cervical diskectomy and fusion from C5-T1 as well as right anterior iliac crest autograft harvest (Dr. Calixto) on 2/9. Returned to the OR 2/12  for posterior instrumented fusion. Intraoperative cultures from 2/9 positive for Staphylococcus aureus (MSSA) and Cutibacterium acnes. Intraoperative cultures from 2/10 also positive for Staphylococcus aureus (MSSA) and Cutibacterium. In addition, blood culture from 2/9/22 is positive for Granulicatella and Staphylococcus aureus (MSSA). Blood culture from 2/11 (1 of 2 bottles) also positive for Micrococcus. He is on IV vancomycin and cefazolin.     Patient denies any recent infection. He denies trauma or skin abnormality on his neck. He denies IV drug use. He denies any prior histories of spine infection or surgery. No immunosuppressive condition. No prosthetic material in his heart valves or cardiovascular devices. Patient denies any dental infection or dental issue and denies recent dental procedure. He denies previous urine infection or urine symptom. He denies diarrhea or GI symptom. He reported that he had his colonoscopy which was reportedly normal.         Review of Systems:     Full 9 pt ROS obtained and negative unless noted above in assessment and interval history.         Current Antimicrobials     Vancomycin (2/10 - )  Cefazolin (2/9 - )         Physical Exam:   Ranges for vital signs:  Temp:  [97.4  F  (36.3  C)-99.1  F (37.3  C)] 98  F (36.7  C)  Pulse:  [109-122] 109  Resp:  [13-16] 16  BP: (117-141)/(68-87) 117/68  SpO2:  [88 %-97 %] 97 %    Intake/Output Summary (Last 24 hours) at 2/14/2022 1536  Last data filed at 2/14/2022 0900  Gross per 24 hour   Intake 800 ml   Output 495 ml   Net 305 ml     Exam:   -Patient discharged, not seen this morning-         Laboratory Data:   Reviewed.  Pertinent for:    Microbiology:  Culture   Date Value Ref Range Status   02/11/2022 No growth after 4 days  Preliminary   02/11/2022 Positive on the 3rd day of incubation (A)  Preliminary   02/11/2022 Micrococcus species (AA)  Preliminary     Comment:     1 of 2 bottles   02/10/2022 No Growth  Final   02/10/2022 No Growth  Final   02/10/2022 No Growth  Final   02/10/2022 1+ Cutibacterium (Propionibacterium) acnes (A)  Preliminary     Comment:     Susceptibilities done on previous cultures   02/10/2022 1+ Staphylococcus aureus (A)  Final     Comment:     Susceptibilities done on previous cultures   02/09/2022 2+ Cutibacterium (Propionibacterium) acnes (A)  Preliminary   02/09/2022 1+ Staphylococcus aureus (A)  Final     Comment:     Susceptibilities done on previous cultures   02/09/2022 1+ Cutibacterium (Propionibacterium) acnes (A)  Preliminary     Comment:     Susceptibilities done on previous cultures   02/09/2022 1+ Staphylococcus aureus (A)  Final   02/09/2022 1+ Staphylococcus aureus (A)  Final   02/09/2022 3+ Cutibacterium (Propionibacterium) acnes (A)  Preliminary     Comment:     Susceptibilities done on previous cultures   02/09/2022 No Growth  Final   02/09/2022 Positive on the 1st day of incubation (A)  Final   02/09/2022 Granulicatella adiacens (AA)  Final     Comment:     1 of 2 bottles   02/09/2022 Staphylococcus aureus (AA)  Final     Comment:     1 of 2 bottles   02/09/2022 No Growth  Final     Inflammatory Markers    Recent Labs   Lab Test 02/09/22  1438   CRP 74.0*     Metabolic Studies       Recent Labs   Lab  Test 02/15/22  1211 02/14/22  1803 02/14/22  0642 02/13/22  0634 02/12/22  1153 02/12/22  0956 02/12/22  0646 02/11/22  0540 02/10/22  0916 02/10/22  0539 02/09/22  1608 02/09/22  1438   NA  --   --  135 136 138 138  --  137  --  136  --  137   POTASSIUM  --   --  4.4 3.9 3.8 3.8  --  3.8 4.4 5.9*  --  3.4   CHLORIDE  --   --  104 104  --   --   --  104  --  108  --  102   CO2  --   --  27 28  --   --   --  28  --  26  --  30   ANIONGAP  --   --  4 4  --   --   --  5  --  2*  --  5   BUN  --   --  7 7  --   --   --  10  --  9  --  12   CR  --   --  0.69 0.66  --   --   --  0.69  --  0.70  --  0.83   GFRESTIMATED  --   --  >90 >90  --   --   --  >90  --  >90  --  >90   GLC 90  --  129*  128* 130*  130* 117* 127*   < > 110*  --  433*   < > 130*   VIANEY  --   --  9.3 8.8  --   --   --  8.8  --  8.9  --  9.8   LACT  --  1.3  --   --  0.8 1.0  --   --   --   --    < >  --     < > = values in this interval not displayed.     Hepatic Studies    Recent Labs   Lab Test 02/09/22  1438   BILITOTAL 0.3   ALKPHOS 133   ALBUMIN 2.8*   AST 12   ALT 44     Hematology Studies      Recent Labs   Lab Test 02/14/22  0642 02/13/22  0634 02/12/22  1153 02/12/22  0956 02/12/22  0540 02/11/22  0540 02/10/22  0544 02/09/22  1438   WBC 12.5* 11.9*  --   --   --  11.2*  --  13.9*   HGB 9.1* 8.2* 8.4* 9.3* 9.8* 9.1*   < > 12.1*   HCT 29.6* 26.5*  --   --   --  28.8*  --  38.5*   * 527*  --   --   --  588*  --  733*    < > = values in this interval not displayed.     Arterial Blood Gas Testing    Recent Labs   Lab Test 02/12/22  1153 02/12/22  0956   PH 7.48* 7.51*   PCO2 40 38   PO2 125* 171*   HCO3 30* 30*   O2PER 45 50.0      Urine Studies     Recent Labs   Lab Test 02/10/22  1918   URINEPH 5.5   NITRITE Negative   LEUKEST Negative   WBCU 1     Vancomycin Levels     Recent Labs   Lab Test 02/14/22  1202 02/13/22  0802   VANCOMYCIN 11.2 17.3     Transplant Immunosuppression Labs Latest Ref Rng & Units 2/14/2022 2/13/2022 2/11/2022  2/10/2022 2/9/2022   Creat 0.66 - 1.25 mg/dL 0.69 0.66 0.69 0.70 0.83   BUN 7 - 30 mg/dL 7 7 10 9 12   WBC 4.0 - 11.0 10e3/uL 12.5(H) 11.9(H) 11.2(H) - 13.9(H)   Neutrophil % - - 58 - 63          Imaging:   PREM 2/15/22  Interpretation Summary  Normal biventricular function.  No valvular abnormalities or vegetations noted.  No pericardial effusion present.    Echo Complete  Result Date: 2/11/2022  Interpretation Summary Global and regional left ventricular function is normal with an EF of 55-60%. The right ventricle is normal in function and size. No significant valvular abnormality. No pericardial effusion is present. IVC diameter <2.1 cm collapsing >50% with sniff suggests a normal RA pressure of 3 mmHg. There is no prior study for direct comparison.     MR Cervical Spine w/o & w Contrast  Result Date: 2/10/2022  Impression: 1. Early postsurgical changes of instrumented posterior spinal fusion with interbody devices at C5-C6, C7-T1 and T1-T2. 2. Bone marrow edema and associated enhancement involving the C6-T2 vertebrae, consistent with osteomyelitis. Epidural  phlegmon extending from phlegmon C5-6 down to T2-3. There are several questionable tiny fluid pockets pockets, for example in the anterior epidural space at C6 level. However it is mostly phlegmon. Edema and enhancement extends into bilateral neural foramina from C6 to T2. Associated severe spinal canal stenosis at C6-7 at C7-T1 and moderate spinal canal stenosis C5-6. 3. Extensive retropharyngeal/prevertebral soft tissue edema and enhancement extending from C5 down to T3 level. This is combination of postsurgical changes and inflammatory/infectious findings. I have personally reviewed the examination and initial interpretation and I agree with the findings. ANDRE ESTRADA MD   SYSTEM ID:  [id number]    CT Cervical Spine w/o Contrast  Result Date: 2/11/2022  Impression: 1. Postsurgical changes of anterior cervical discectomy and fusion C5-6 and C7-T2 with  associated postoperative prevertebral soft tissue edema and emphysema. Consider MRI for evaluation of soft tissue abscess and osteomyelitis. 2. Multilevel cervical spondylosis as described above. I have personally reviewed the examination and initial interpretation and I agree with the findings. HOME ROSE MD   SYSTEM ID:  [id number]   ------------------------------    Prolonged Parenteral/Oral Antibiotic Recommendations and ID Follow up  This template provides final ID recommendations as of this date. If there are clinical changes or questions please call the ID team.     Infectious Diseases Diagnosis/es: Bacteremia (MSSA, Granulicatella), Possible hardware infection (MSSA, Cutibacterium)    IV antibiotics: Yes    Antibiotic Information  Name of Antibiotic Dose of Antibiotic1 Pharmacy to assist with dosing Y/N Anticipated duration Effective start date2 End date   Vancomycin Per pharmacy Yes 6 weeks 2/11/22 3/25/22                   1.Dose of antibiotic will need to be renally adjusted if creatinine clearance changes  2.Effective start date is the date of therapy with appropriate spectrum    Method of antibiotic delivery:PICC line. At the end of therapy should the line be removed? Yes.     Tentative plans for disposition: Home    Weekly labs required: CBC with diff and BMP. Dr. Way will follow labs at discharge until ID follow up. Please have labs faxed to ID clinic fax at [phone].    Appointment to be scheduled: Within 6 weeks of discharge. Type of ID Clinic Appointment Virtual Video visit. Appointment will be scheduled with: Next Available General ID Provider. Infectious diseases schedulers at [facility] will assist in making the appointment. If the patient remains in the hospital at this date please request that the ID team see the patient.     Imaging for ID follow up: ID Imaging: Follow up imaging for ID purposes not recommended at the time of this documentation.     ID provider to route this note to the  appropriate Epic pools: [facility] INFECTIOUS DISE[facility] ADULT [facility], CLINIC COORDINATORS-MED-SPEC-[facility], PANDA, [facility]    [facility] [address]/ID Clinic Information:  [facility], Clinic [address] [place] [address]  [id number]  Phone: [phone]  Fax: [phone]

## 2022-02-16 NOTE — PROGRESS NOTES
[facility]  NURSE LIAISON NOTE  Met with patient and his wife at bedside. Patient is expected to DC today. Educated on I role in Pt DC to home.  Patient and wife state they are comfortable doing home infusion and are able to perform infusion independently.    HP  Mock Teach, syringe-air removal by pt's wife.  She went through all steps of HP ABX administration,  Pulse flushing and proper sequence of ABX step for administration. She has good technique and understanding, and agrees to contact [facility] for any questions, clarification or further education needs.    Educated to keep dressing CDI, and to cover dressing during bathing. Encouraged to call [facility] for any questions, clarifications or problems.  Educated on Deliveries, importance of refrigerating medications.  Both patient and his wife were engaged during this RN Visit in hospital room.  They understand to expect possible phone calls from [facility].  Education provided on RN/RPH on call 24/7, phone number provided.  Educated to review [facility] folder and contents, including Service Agreement and Consents, and educational Materials.  Both verbalized understanding of above.  DC: today  DC Therapies-DRUG: IV Vanco every 12 hrs.  Delivery/Supplies: To patient's home by 6pm.  LINES/TUBES: PICC SLV  AGENCY: [facility]  SNV: not needed for teaching.  NOTE: dosing at 8am and 8pm    Lexi Tsang RN / Nurse Liaison  [facility]  [email or web address]  Cell [phone] M-F/ [facility] office [phone] *24/7

## 2022-02-16 NOTE — PROCEDURES
[facility]    Single Lumen PICC Placement    Date/Time: 2/16/2022 10:09 AM  Performed by: Deanne Alcantar RN  Authorized by: Kulwinder Calixto MD   Indications: vascular access      UNIVERSAL PROTOCOL   Site Marked: Yes  Prior Images Obtained and Reviewed:  Yes  Required items: Required blood products, implants, devices and special equipment available    Patient identity confirmed:  Verbally with patient, arm band and hospital-assigned identification number  NA - No sedation, light sedation, or local anesthesia  Confirmation Checklist:  Patient's identity using two indicators, relevant allergies, procedure was appropriate and matched the consent or emergent situation and correct equipment/implants were available  Time out: Immediately prior to the procedure a time out was called    Universal Protocol: the Joint Commission Universal Protocol was followed    Preparation: Patient was prepped and draped in usual sterile fashion    ESBL (mL):  5     ANESTHESIA    Anesthesia: Local infiltration  Local Anesthetic:  Lidocaine 1% without epinephrine  Anesthetic Total (mL):  2      SEDATION    Patient Sedated: No        Preparation: skin prepped with ChloraPrep  Skin prep agent: skin prep agent completely dried prior to procedure  Sterile barriers: maximum sterile barriers were used: cap, mask, sterile gown, sterile gloves, and large sterile sheet  Hand hygiene: hand hygiene performed prior to central venous catheter insertion  Type of line used: PICC  Catheter type: single lumen  Lumen type: valved  Catheter size: 4 Fr  Brand: Bard  Lot number: [id number]  Placement method: venipuncture, MST, ultrasound and tip confirmation system (3CG Technology)  Number of attempts: 1  Successful placement: yes  Orientation: left  Location: basilic vein  Site rationale: left basilic vein more easily accessible  Arm circumference: adults 10 cm  Extremity circumference: 31  Visible  catheter length: 2  Total catheter length: 47  Dressing and securement: alcohol impregnated caps, blood cleaned with CHG, chlorhexidine disc applied, dressing applied, glue, occlusive dressing applied, statlock and sterile dressing applied  Post procedure assessment: blood return through all ports and free fluid flow (3CG technology verification)  PROCEDURE   Patient Tolerance:  Patient tolerated the procedure well with no immediate complicationsDescribe Procedure: LUE PICC placed. Tip verification using 3CG technology. Okay to use.

## 2022-02-16 NOTE — PROGRESS NOTES
[facility]    Medicine Progress Note - Hospitalist Service, GOLD TEAM 17    Date of Admission:  2/9/2022    Assessment & Plan      Dave Calero is a healthy 51-year-old male. He developed neck pain and pain between his shoulder blades around 1/3/22.  Had CT  Without major concerns, then developed sensation of pill getting stuck  In throat and had decreased sensation in arms,  Was seen by orthopedic surgery  2/8  And MRI on 2/9 showed cervical discitis/ epidural abscess  Thus was referred to  [facility]  ER for further evaluation.   Patient  Was admitted by Dr Calixto  And underwent surgery x 2 . Antibiotics as outlined bellow  by ID.         Cervical discitis, epidural  abscess   -Status post 1. ACDF C5-6,C7-T1, T1-T2 on 2/9  2. PISF C5-T2 w/ laminectomies at C5-6, SPO C5-6. C6-7 on 2/12  -Seen by ID continue IV vancomycin  And cefazolin  , adjust depending on PREM results   PREM done  2/15 awaiting results  ( check with orthopedic surgery  In AM ands was ok to proceed with PREM after cervical surgery )   - intraop culture grew MSSA and Cutibacterium acnes ( propionibacterium)   - MSSA bacteremia  And also grew Granulicatella form blood culture 2/9    - 1/2 blood culture form 2/11 micrococcus, likely contaminant   -OK for PICC per ID and was placed 2/26   - will need indefinite chronic suppressive antibiotics therapy   - ? Source of infection , no history IV drug use, issues with tehth   - CRP 74 2/9   - CMP, CBC with diff CRP monitoring per ID   -PREM 2/15 did not show vegetations   Per ID's rec continue vancomycin  + rifampin 600 mg po every day , both for 6 weeks  Followed by chronic suppressive therapy   - follow up  With ID clinic in about 6 weeks prior to completion of antibiotics      Sinus tachycardia  - likely physiologic, ie pain, infection and anemia, stable        Anemia  -Secondary to surgeries, infection, stable      Depression,  Anxiety ADHD   -back  on fluoxetine but at 20 mg, increase back to 30 mg if doing well on it            Diet: Snacks/Supplements Adult: Other; PRN supplements; Between Meals  Regular Diet Adult  Diet    DVT Prophylaxis: per orthopedic surgery    Jerry Catheter: Not present  Central Lines: PRESENT  PICC Single Lumen 02/16/22 Left Basilic-Site Assessment: WDL  Cardiac Monitoring: None  Code Status: Full Code      Disposition Plan   Expected Discharge: 2/16    .    Kadie Garner MD  Hospitalist Service, GOLD TEAM 17  [facility]  Securely message with the Vocera Web Console (learn more here)  Text page via [facility] Paging/Directory   Please see signed in provider for up to date coverage information      Clinically Significant Risk Factors Present on Admission                    ______________________________________________________________________    Interval History    Doing better today , pain control better, denies chest pain  No shortness of breath  No nausea vomiting or diarrhea     Data reviewed today: I reviewed all medications, new labs and imaging results over the last 24 hours    Physical Exam   Vital Signs: Temp: 98  F (36.7  C) Temp src: Oral BP: 117/68 Pulse: 109   Resp: 16 SpO2: 97 % O2 Device: None (Room air) Oxygen Delivery: 1 LPM  Weight: 174 lbs 0 oz     General appearence: awake alert    NECK : with [place] collar on   RESPIRATORY: lungs clear to auscultation bilateral,  CARDIOVASCULAR:S1 S2 regular rate and rhythm, no rubs gallops or murmurs appreciated  GASTROINTESTINAL:soft, non-distended , non-tender , + bowel sounds, no masses felt   SKIN: warm and dry, no mottling noted   NEUROLOGIC; awake alert and oriented, no focal deficits found  EXTREMITIES: no clubbing, cyanosis or edema , moves all extremity, good pedal pulses   MUSCULOSKELETAL: without deformity   Data   Recent Labs   Lab 02/15/22  1211 02/14/22  0642 02/13/22  0634 02/12/22  1153 02/12/22  0540 02/11/22  0540  22  1438   WBC  --  12.5* 11.9*  --   --  11.2*  --  13.9*   HGB  --  9.1* 8.2* 8.4*   < > 9.1*   < > 12.1*   MCV  --  88 86  --   --  86  --  88   PLT  --  581* 527*  --   --  588*  --  733*   INR  --   --   --   --   --   --   --  0.98   NA  --  135 136 138   < > 137   < > 137   POTASSIUM  --  4.4 3.9 3.8   < > 3.8   < > 3.4   CHLORIDE  --  104 104  --   --  104   < > 102   CO2  --  27 28  --   --  28   < > 30   BUN  --  7 7  --   --  10   < > 12   CR  --  0.69 0.66  --   --  0.69   < > 0.83   ANIONGAP  --  4 4  --   --  5   < > 5   VIANEY  --  9.3 8.8  --   --  8.8   < > 9.8   GLC 90 129*  128* 130*  130* 117*   < > 110*   < > 130*   ALBUMIN  --   --   --   --   --   --   --  2.8*   PROTTOTAL  --   --   --   --   --   --   --  8.5   BILITOTAL  --   --   --   --   --   --   --  0.3   ALKPHOS  --   --   --   --   --   --   --  133   ALT  --   --   --   --   --   --   --  44   AST  --   --   --   --   --   --   --  12    < > = values in this interval not displayed.     Recent Results (from the past 24 hour(s))   Transesophageal Echocardiogram   Result Value    LVEF  60-65%    [facility]    [id number]  Formerly [facility]  [id number]  [id number]^[place]^CARLO^LANA     [facility]  Echocardiography Laboratory  [address] [place] [id number]     Name: SONYA ARAUJO  MRN: [id number]  : 1970  Study Date: 02/15/2022 01:42 PM  Age: 51 yrs  Gender: Male  Patient Location: [facility]  Reason For Study: 2nd degree AV Block, Endocarditis  Ordering Physician: CARLO GOFF  Performed By: Son Vale     Attestation  I was present during PREM probe placement by the fellow, Son Vale. I  personally viewed the imaging and agree with the interpretation and report as  documented by the fellow.  ______________________________________________________________________________  Interpretation Summary  Normal biventricular function.  No valvular abnormalities or  vegetations noted.  No pericardial effusion present.  ______________________________________________________________________________  Procedure  Intraoperative Transesophageal Echocardiogram with color and spectral Doppler  performed. 3D image acquisition, reconstruction, and real-time interpretation  was performed. Procedure location Operating Room. Informed consent for  Transesophegeal echo obtained. PREM Probe #63 was used during the procedure.  Sedation, endotracheal intubation, and mechanical ventilation were initiated  prior to the PREM and were monitored by anesthesia. The Transducer was inserted  without difficulty . Complications None. The patient tolerated the procedure  well. The patient's rhythm is sinus tachycardia. Adequate quality two-  dimensional was performed and interpreted. Adequate quality color and spectral  Doppler were performed and interpreted.     Left Ventricle  Left ventricular size is normal. Global and regional left ventricular function  is normal with an EF of 60-65%.     Right Ventricle  The right ventricle is normal size. Global right ventricular function is  normal.     Atria  Both atria appear normal. The left atrial appendage is normal. It is free of  spontaneous echo contrast and thrombus. No left atrial mass or thrombus  visualized. The atrial septum is intact as assessed by color Doppler .     Mitral Valve  The mitral valve is normal. Trace mitral insufficiency is present.     Aortic Valve  Aortic valve is normal in structure and function. The aortic valve is  tricuspid. No aortic regurgitation is present.     Tricuspid Valve  The tricuspid valve is normal. Trace tricuspid insufficiency is present.     Pulmonic Valve  The pulmonic valve is normal.     Vessels  The aorta root is normal.     Pericardium  No pericardial effusion is present.     Miscellaneous  Transgastric imaging was not performed.      ______________________________________________________________________________  Report approved by: Simin Konety, MDon 02/15/2022 03:20 PM     ______________________________________________________________________________

## 2022-02-16 NOTE — PROGRESS NOTES
Care Management Discharge Note    Discharge Date: 02/16/2022       Discharge Disposition: Home, Home Infusion    Discharge Services:  Home IV ABX    Discharge DME:  N/A    Discharge Transportation: spouse provided transport    Private pay costs discussed: insurance costs out of pocket expenses         Education Provided on the Discharge Plan: yes   Persons Notified of Discharge Plans: family  Patient/Family in Agreement with the Plan: yes    Handoff Referral Completed: Yes    Additional Information:  Patient received teaching at bedside, for home infusion. Discharge and left with spouse.  No further needs identified.        Ghazal MUJICAN RN CCM  RN Care Coordinator [address] [place] [id number]  [email or web address]   Office: [phone]   Pager: [phone]    To contact Weekend RNCC, dial [phone] and enter job code [phone] at prompt. This pager can not be contacted by text page or outside line.

## 2022-02-16 NOTE — DISCHARGE SUMMARY
ORTHOPEDIC SURGERY DISCHARGE SUMMARY     Date of Admission: 2/9/2022  Date of Discharge: 2/16/2022  Disposition: Home  Staff Physician: No att. providers found  Primary Care Provider: Maurisio Araiza    DISCHARGE DIAGNOSIS:  Epidural abscess and discitis    PROCEDURES: Procedure(s):  1. ACDF C5-6,C7-T1, T1-T2 on 2/9  2. PISF C5-T2 w/ laminectomies at C5-6, SPO C5-6. C6-7 on 2/12    BRIEF HISTORY:  Briefly, this is a 51 year old patient who was found to have epidural abscess and discitis (C5-T1) in the ED. He was transferred here for further management. The patient has failed conservative treatment of symptoms and desires more definitive intervention. Therefore, after reviewing non-operative and operative options including the risks and benefits associated with each, the patient elected to proceed with the above stated procedure.     HOSPITAL COURSE:    The patient was admitted following the above listed procedures for pain control and rehabilitation. Dave Calero did well post-operatively. Medicine was consulted post operatively to aid in management of medical co-morbidities. The patient received routine nursing cares and at the time of discharge was medically stable. Vital signs were stable throughout admission. The patient is tolerating a regular diet and is voiding spontaneously. All PT/OT goals have been met for safe mobility. Pain is now controlled on oral medications which will be available on discharge. Stool softeners have been used while taking pain medications to help prevent constipation. Dave Calero is deemed medically safe to discharge.     Antibiotics:  Given per ID recommendations. Plan to discharge with vancomycin and Rifampin  DVT prophylaxis:  mechanical  PT Progress:  Has met PT/OT goals for safe mobility.   Pain Meds:  Weaned off all IV pain meds by discharge.  Inpatient Events:  No significant postoperative events or complications.     PHYSICAL EXAM:    /68 (BP  "Location: Right arm)   Pulse 109   Temp 98  F (36.7  C) (Oral)   Resp 16   Ht 1.753 m (5' 9\")   Wt 78.9 kg (174 lb)   SpO2 97%   BMI 25.70 kg/m        FOLLOWUP:    Follow up with Dr. Calixto at 6 weeks postoperatively.    Future Appointments   Date Time Provider Department Center   3/10/2022  2:30 PM Nazario Tapia MD [facility]   3/24/2022  9:40 AM Kulwinder Calixto MD [facility]       Orthopedic Surgery appointments are at the [facility] ([address] [place] [address]). Call [phone] to schedule a follow-up appointment at this location with your provider.     PLANNED DISCHARGE ORDERS:      Discharge Medication List as of 2/16/2022 12:36 PM      START taking these medications    Details   diazepam (VALIUM) 5 MG tablet Take 1 tablet (5 mg) by mouth every 6 hours as needed for anxiety or muscle spasms, Disp-30 tablet, R-0, E-Prescribe      methocarbamol (ROBAXIN) 750 MG tablet Take 1 tablet (750 mg) by mouth 4 times daily as needed for muscle spasms, Disp-50 tablet, R-0, E-Prescribe      oxyCODONE (ROXICODONE) 5 MG tablet Take 1 tablet (5 mg) by mouth every 4 hours, Disp-50 tablet, R-0, E-Prescribe      polyethylene glycol (MIRALAX) 17 g packet Take 17 g by mouth daily, Disp-7 packet, R-0, E-Prescribe      rifampin (RIFADIN) 300 MG capsule Take 2 capsules (600 mg) by mouth daily, Disp-84 capsule, R-0, E-Prescribe      senna-docusate (SENOKOT-S/PERICOLACE) 8.6-50 MG tablet Take 1 tablet by mouth 2 times daily, Disp-30 tablet, R-0, E-Prescribe      vancomycin 1,500 mg Inject 1,500 mg into the vein every 12 hours, Disp-84 Bag, R-0, Local Print         CONTINUE these medications which have CHANGED    Details   acetaminophen (TYLENOL) 500 MG tablet Take 1-2 tablets (500-1,000 mg) by mouth every 6 hours as needed for mild pain, Disp-40 tablet, R-0, E-Prescribe      hydrOXYzine (ATARAX) 50 MG tablet Take 1 tablet (50 mg) by mouth every 6 hours as needed for " itching or other (pain adjunct), Disp-40 tablet, R-0, E-Prescribe         CONTINUE these medications which have NOT CHANGED    Details   amphetamine-dextroamphetamine (ADDERALL XR) 30 MG 24 hr capsule Take 30 mg by mouth daily, Historical      baclofen (LIORESAL) 10 MG tablet Take 5-10 mg by mouth 3 times daily as needed for muscle spasms, Historical      camphor-menthol-methyl sal 4-10-30 % CREA Externally apply topically 3 times daily as needed (pain)Historical      cetirizine (ZYRTEC) 10 MG tablet Take 10 mg by mouth daily, Historical      !! FLUoxetine (PROZAC) 10 MG capsule Take 10 mg by mouth daily Take with the 20mg capsule for a total daily dose of 30mg, Historical      !! FLUoxetine (PROZAC) 20 MG capsule Take 20 mg by mouth daily Take with the 10mg capsule for a total daily dose of 30mg, Historical      gabapentin (NEURONTIN) 300 MG capsule Take 300mg by mouth at bedtime for 2 days, then increase to 600mg at bedtime daily, Historical      ibuprofen (ADVIL/MOTRIN) 200 MG tablet Take 200 mg by mouth every 4 hours as needed for mild pain, Historical      VITAMIN D PO Take 1 tablet by mouth daily, Historical       !! - Potential duplicate medications found. Please discuss with provider.      STOP taking these medications       menthol (ICY HOT) 5 % PTCH Comments:   Reason for Stopping:         Menthol, Topical Analgesic, (BIOFREEZE EX) Comments:   Reason for Stopping:                 Discharge Procedure Orders   Home Infusion Referral   Referral Priority: Routine Referral Type: Consultation   Number of Visits Requested: 1     Reason for your hospital stay   Order Comments: /p the following procedures w/ Dr. Calixto:     1. ACDF C5-6,C7-T1, T1-T2 on 2/9  2. PISF C5-T2 w/ laminectomies at C5-6, SPO C5-6. C6-7 on 2/12     Cultures growing MSSA, Cutibacterium, and the Granulicatella. PREM negative for vegetations.     ID recommendations of Vancomycin + Rifampin 600mg qDay PO, both for 6 weeks, followed by chronic  suppressive therapy.     Activity   Order Comments: Your activity upon discharge: activity as tolerated    Activity: Up with assist until independent. No excessive bending or twisting. No lifting >10 lbs x 6 weeks. No Scar lift for transfers.   Weight bearing status: WBAT.     Order Specific Question Answer Comments   Is discharge order? Yes      Adult [facility] Follow-up and recommended labs and tests   Order Comments: Follow up with Dr Calixto as scheduled.  Clinic phone number is     Appointments on [place] and/or [facility] (with [facility] or [facility] provider or service). Call [phone] if you haven't heard regarding these appointments within 7 days of discharge.     When to contact your care team   Order Comments: Call Dr Calixto if you have any of the following: temperature greater than 101.3  or less than 96.5,  increased shortness of breath, increased drainage, increased swelling, or increased pain.     Wound care and dressings   Order Comments: Instructions to care for your wound at home: ice to area for comfort, keep wound clean and dry, may get incision wet in shower but do not soak or scrub, reinforce dressing as needed, and remove dressing in 7 days.     Diet   Order Comments: Follow this diet upon discharge: Orders Placed This Encounter      Snacks/Supplements Adult: Other; PRN supplements; Between Meals      Regular Diet Adult     Order Specific Question Answer Comments   Is discharge order? Yes        Orthopedic surgery staff for this patient is Dr. Calixto. Discussed.    --  Kai Butterfield MD  Orthopedic Surgery PGY-1  1979

## 2022-02-16 NOTE — PLAN OF CARE
"Dave Calero is a 51 yr old male admitted on 2/9/22 for the following surgeries -    1. ACDF C5-6,C7-T1, T1-T2 on 2/9  2. PISF C5-T2 w/ laminectomies at C5-6, SPO C5-6. C6-7 on 2/12    PREM completed today at [facility]. Pt arrived back on the unit around 1700. Report complete from EMS.    /78 (BP Location: Left arm, Patient Position: Semi-Salas's)   Pulse 110   Temp 97.4  F (36.3  C) (Oral)   Resp 16   Ht 1.753 m (5' 9\")   Wt 78.9 kg (174 lb)   SpO2 96%   BMI 25.70 kg/m  .     Pt alert and verbal. Oriented x 4. Calm, cooperative. Numbness present RUE - ortho aware. No edema. LS clear. Denies SOB. Denies chest pain. RA during the day. 2L via NC at night for desatting. No cough observed. Last BM 2/14/22. Continent of bowel and bladder. Independent in room. L PIV saline locked. Antibiotics Q 8hrs & 12hrs. Pain prior to bed rated 5/10 - managed with PRN oxycodone, atarax, tylenol, and robaxin. Regular diet, tolerating well.    Continue POC. PICC placement prior to discharge for ABX. Possible discharge 2/16/22.   "

## 2022-02-17 ENCOUNTER — HOME INFUSION (PRE-WILLOW HOME INFUSION) (OUTPATIENT)
Dept: PHARMACY | Facility: CLINIC | Age: 52
End: 2022-02-17

## 2022-02-17 ENCOUNTER — LAB REQUISITION (OUTPATIENT)
Dept: LAB | Facility: CLINIC | Age: 52
End: 2022-02-17
Payer: COMMERCIAL

## 2022-02-17 DIAGNOSIS — G06.2 EXTRADURAL AND SUBDURAL ABSCESS, UNSPECIFIED: ICD-10-CM

## 2022-02-17 LAB
ALBUMIN SERPL-MCNC: 2.1 G/DL (ref 3.4–5)
ALP SERPL-CCNC: 104 U/L (ref 40–150)
ALT SERPL W P-5'-P-CCNC: 25 U/L (ref 0–70)
ANION GAP SERPL CALCULATED.3IONS-SCNC: 5 MMOL/L (ref 3–14)
AST SERPL W P-5'-P-CCNC: 20 U/L (ref 0–45)
BACTERIA TISS BX CULT: ABNORMAL
BACTERIA WND CULT: ABNORMAL
BASOPHILS # BLD AUTO: 0.1 10E3/UL (ref 0–0.2)
BASOPHILS NFR BLD AUTO: 1 %
BILIRUB SERPL-MCNC: 0.5 MG/DL (ref 0.2–1.3)
BUN SERPL-MCNC: 10 MG/DL (ref 7–30)
CALCIUM SERPL-MCNC: 9.5 MG/DL (ref 8.5–10.1)
CHLORIDE BLD-SCNC: 104 MMOL/L (ref 94–109)
CO2 SERPL-SCNC: 30 MMOL/L (ref 20–32)
CREAT SERPL-MCNC: 0.58 MG/DL (ref 0.66–1.25)
CRP SERPL-MCNC: 53.2 MG/L (ref 0–8)
EOSINOPHIL # BLD AUTO: 0.4 10E3/UL (ref 0–0.7)
EOSINOPHIL NFR BLD AUTO: 5 %
ERYTHROCYTE [DISTWIDTH] IN BLOOD BY AUTOMATED COUNT: 12.7 % (ref 10–15)
GFR SERPL CREATININE-BSD FRML MDRD: >90 ML/MIN/1.73M2
GLUCOSE BLD-MCNC: 101 MG/DL (ref 70–99)
HCT VFR BLD AUTO: 28.5 % (ref 40–53)
HGB BLD-MCNC: 9.1 G/DL (ref 13.3–17.7)
IMM GRANULOCYTES # BLD: 0 10E3/UL
IMM GRANULOCYTES NFR BLD: 0 %
LYMPHOCYTES # BLD AUTO: 1.9 10E3/UL (ref 0.8–5.3)
LYMPHOCYTES NFR BLD AUTO: 25 %
MCH RBC QN AUTO: 27 PG (ref 26.5–33)
MCHC RBC AUTO-ENTMCNC: 31.9 G/DL (ref 31.5–36.5)
MCV RBC AUTO: 85 FL (ref 78–100)
MONOCYTES # BLD AUTO: 0.6 10E3/UL (ref 0–1.3)
MONOCYTES NFR BLD AUTO: 8 %
NEUTROPHILS # BLD AUTO: 4.5 10E3/UL (ref 1.6–8.3)
NEUTROPHILS NFR BLD AUTO: 61 %
NRBC # BLD AUTO: 0 10E3/UL
NRBC BLD AUTO-RTO: 0 /100
PLATELET # BLD AUTO: 603 10E3/UL (ref 150–450)
POTASSIUM BLD-SCNC: 3.8 MMOL/L (ref 3.4–5.3)
PROT SERPL-MCNC: 6.6 G/DL (ref 6.8–8.8)
RBC # BLD AUTO: 3.37 10E6/UL (ref 4.4–5.9)
SODIUM SERPL-SCNC: 139 MMOL/L (ref 133–144)
VANCOMYCIN SERPL-MCNC: 11.8 MG/L
WBC # BLD AUTO: 7.6 10E3/UL (ref 4–11)

## 2022-02-17 PROCEDURE — 80202 ASSAY OF VANCOMYCIN: CPT | Performed by: STUDENT IN AN ORGANIZED HEALTH CARE EDUCATION/TRAINING PROGRAM

## 2022-02-17 PROCEDURE — 80053 COMPREHEN METABOLIC PANEL: CPT | Performed by: STUDENT IN AN ORGANIZED HEALTH CARE EDUCATION/TRAINING PROGRAM

## 2022-02-17 PROCEDURE — 86140 C-REACTIVE PROTEIN: CPT | Performed by: STUDENT IN AN ORGANIZED HEALTH CARE EDUCATION/TRAINING PROGRAM

## 2022-02-17 PROCEDURE — 85025 COMPLETE CBC W/AUTO DIFF WBC: CPT | Performed by: STUDENT IN AN ORGANIZED HEALTH CARE EDUCATION/TRAINING PROGRAM

## 2022-02-18 LAB
BACTERIA BLD CULT: ABNORMAL
BACTERIA BLD CULT: ABNORMAL

## 2022-02-18 NOTE — ANESTHESIA PREPROCEDURE EVALUATION
Anesthesia Pre-Procedure Evaluation    Patient: Dave Calero   MRN: [id number] : 1970        Preoperative Diagnosis: Endocarditis [I38]    Procedure : Procedure(s):  ECHOCARDIOGRAM, TRANSESOPHAGEAL (PREM), INTRAOPERATIVE          History reviewed. No pertinent past medical history.   Past Surgical History:   Procedure Laterality Date     OPTICAL TRACKING SYSTEM FUSION POSTERIOR CERVICAL THREE + LEVELS N/A 2022    Procedure: Posterior instrumented spinal fusion cervical 5 to thoracic 2, with laminectomies at Cervical 5, Cervical 6, Smithe Tucker Oseotomy Cervical 5-6, Cervical 6-7 ;  Surgeon: Kulwinder Calixto MD;  Location: [facility] OR     OPTICAL TRACKING SYSTEM FUSION POSTERIOR SPINE LUMBAR N/A 2022    Procedure: Anterior cervical diskectomy and fusion, cervical 5 to thoracic 1 (3 levels); right or anterior iliac crest autograft harvest. ;  Surgeon: Kulwinder Calixto MD;  Location: [facility] OR     TRANSESOPHAGEAL ECHOCARDIOGRAM INTRAOPERATIVE N/A 2/15/2022    Procedure: ECHOCARDIOGRAM, TRANSESOPHAGEAL (PREM), INTRAOPERATIVE;  Surgeon: GENERIC ANESTHESIA PROVIDER;  Location: [facility] OR      No Known Allergies   Social History     Tobacco Use     Smoking status: Former Smoker     Smokeless tobacco: Never Used   Substance Use Topics     Alcohol use: Not on file      Wt Readings from Last 1 Encounters:   22 78.9 kg (174 lb)              OUTSIDE LABS:  CBC:   Lab Results   Component Value Date    WBC 7.6 2022    WBC 12.5 (H) 2022    HGB 9.1 (L) 2022    HGB 9.1 (L) 2022    HCT 28.5 (L) 2022    HCT 29.6 (L) 2022     (H) 2022     (H) 2022     BMP:   Lab Results   Component Value Date     2022     2022    POTASSIUM 3.8 2022    POTASSIUM 4.4 2022    CHLORIDE 104 2022    CHLORIDE 104 2022    CO2 30 2022    CO2 27 2022    BUN 10 2022    BUN 7 2022    CR 0.58  (L) 02/17/2022    CR 0.69 02/14/2022     (H) 02/17/2022    GLC 90 02/15/2022     COAGS:   Lab Results   Component Value Date    INR 0.98 02/09/2022     POC: No results found for: BGM, HCG, HCGS  HEPATIC:   Lab Results   Component Value Date    ALBUMIN 2.1 (L) 02/17/2022    PROTTOTAL 6.6 (L) 02/17/2022    ALT 25 02/17/2022    AST 20 02/17/2022    ALKPHOS 104 02/17/2022    BILITOTAL 0.5 02/17/2022     OTHER:   Lab Results   Component Value Date    PH 7.48 (H) 02/12/2022    LACT 1.3 02/14/2022    VIANEY 9.5 02/17/2022    CRP 53.2 (H) 02/17/2022       Anesthesia Plan    ASA Status:  3      Anesthesia Type: General.     - Airway: ETT   Induction: Intravenous.   Maintenance: Balanced.        Consents    Anesthesia Plan(s) and associated risks, benefits, and realistic alternatives discussed. Questions answered and patient/representative(s) expressed understanding.    - Discussed:     - Discussed with:  Patient         Postoperative Care    Pain management: IV analgesics.   PONV prophylaxis: Ondansetron (or other 5HT-3), Dexamethasone or Solumedrol     Comments:                Neil Burton MD

## 2022-02-23 ENCOUNTER — HOME INFUSION (PRE-WILLOW HOME INFUSION) (OUTPATIENT)
Dept: PHARMACY | Facility: CLINIC | Age: 52
End: 2022-02-23

## 2022-02-24 ENCOUNTER — HOME INFUSION (PRE-WILLOW HOME INFUSION) (OUTPATIENT)
Dept: PHARMACY | Facility: CLINIC | Age: 52
End: 2022-02-24

## 2022-02-24 ENCOUNTER — LAB REQUISITION (OUTPATIENT)
Dept: LAB | Facility: CLINIC | Age: 52
End: 2022-02-24
Payer: COMMERCIAL

## 2022-02-24 DIAGNOSIS — G06.2 EXTRADURAL AND SUBDURAL ABSCESS, UNSPECIFIED: ICD-10-CM

## 2022-02-24 LAB
ALBUMIN SERPL-MCNC: 2.8 G/DL (ref 3.4–5)
ALP SERPL-CCNC: 154 U/L (ref 40–150)
ALT SERPL W P-5'-P-CCNC: 31 U/L (ref 0–70)
ANION GAP SERPL CALCULATED.3IONS-SCNC: 5 MMOL/L (ref 3–14)
AST SERPL W P-5'-P-CCNC: 13 U/L (ref 0–45)
BASOPHILS # BLD AUTO: 0.1 10E3/UL (ref 0–0.2)
BASOPHILS NFR BLD AUTO: 1 %
BILIRUB SERPL-MCNC: 0.2 MG/DL (ref 0.2–1.3)
BUN SERPL-MCNC: 13 MG/DL (ref 7–30)
CALCIUM SERPL-MCNC: 8.6 MG/DL (ref 8.5–10.1)
CHLORIDE BLD-SCNC: 106 MMOL/L (ref 94–109)
CO2 SERPL-SCNC: 28 MMOL/L (ref 20–32)
CREAT SERPL-MCNC: 0.68 MG/DL (ref 0.66–1.25)
CRP SERPL-MCNC: 14 MG/L (ref 0–8)
EOSINOPHIL # BLD AUTO: 0.4 10E3/UL (ref 0–0.7)
EOSINOPHIL NFR BLD AUTO: 5 %
ERYTHROCYTE [DISTWIDTH] IN BLOOD BY AUTOMATED COUNT: 14.1 % (ref 10–15)
GFR SERPL CREATININE-BSD FRML MDRD: >90 ML/MIN/1.73M2
GLUCOSE BLD-MCNC: 122 MG/DL (ref 70–99)
HCT VFR BLD AUTO: 33.2 % (ref 40–53)
HGB BLD-MCNC: 10.4 G/DL (ref 13.3–17.7)
HOLD SPECIMEN: NORMAL
IMM GRANULOCYTES # BLD: 0.1 10E3/UL
IMM GRANULOCYTES NFR BLD: 1 %
LYMPHOCYTES # BLD AUTO: 2.4 10E3/UL (ref 0.8–5.3)
LYMPHOCYTES NFR BLD AUTO: 26 %
MCH RBC QN AUTO: 26.7 PG (ref 26.5–33)
MCHC RBC AUTO-ENTMCNC: 31.3 G/DL (ref 31.5–36.5)
MCV RBC AUTO: 85 FL (ref 78–100)
MONOCYTES # BLD AUTO: 0.8 10E3/UL (ref 0–1.3)
MONOCYTES NFR BLD AUTO: 9 %
NEUTROPHILS # BLD AUTO: 5.4 10E3/UL (ref 1.6–8.3)
NEUTROPHILS NFR BLD AUTO: 58 %
NRBC # BLD AUTO: 0 10E3/UL
NRBC BLD AUTO-RTO: 0 /100
PLATELET # BLD AUTO: 515 10E3/UL (ref 150–450)
POTASSIUM BLD-SCNC: 4.2 MMOL/L (ref 3.4–5.3)
PROT SERPL-MCNC: 6.6 G/DL (ref 6.8–8.8)
RBC # BLD AUTO: 3.89 10E6/UL (ref 4.4–5.9)
SODIUM SERPL-SCNC: 139 MMOL/L (ref 133–144)
VANCOMYCIN SERPL-MCNC: 14.4 MG/L
WBC # BLD AUTO: 9.2 10E3/UL (ref 4–11)

## 2022-02-24 PROCEDURE — 80053 COMPREHEN METABOLIC PANEL: CPT | Performed by: STUDENT IN AN ORGANIZED HEALTH CARE EDUCATION/TRAINING PROGRAM

## 2022-02-24 PROCEDURE — 80202 ASSAY OF VANCOMYCIN: CPT | Performed by: STUDENT IN AN ORGANIZED HEALTH CARE EDUCATION/TRAINING PROGRAM

## 2022-02-24 PROCEDURE — 85025 COMPLETE CBC W/AUTO DIFF WBC: CPT | Performed by: STUDENT IN AN ORGANIZED HEALTH CARE EDUCATION/TRAINING PROGRAM

## 2022-02-24 PROCEDURE — 86140 C-REACTIVE PROTEIN: CPT | Performed by: STUDENT IN AN ORGANIZED HEALTH CARE EDUCATION/TRAINING PROGRAM

## 2022-02-25 ENCOUNTER — HOME INFUSION (PRE-WILLOW HOME INFUSION) (OUTPATIENT)
Dept: PHARMACY | Facility: CLINIC | Age: 52
End: 2022-02-25

## 2022-03-02 ENCOUNTER — HOME INFUSION (PRE-WILLOW HOME INFUSION) (OUTPATIENT)
Dept: PHARMACY | Facility: CLINIC | Age: 52
End: 2022-03-02

## 2022-03-03 ENCOUNTER — LAB REQUISITION (OUTPATIENT)
Dept: LAB | Facility: CLINIC | Age: 52
End: 2022-03-03
Payer: COMMERCIAL

## 2022-03-03 ENCOUNTER — HOME INFUSION (PRE-WILLOW HOME INFUSION) (OUTPATIENT)
Dept: PHARMACY | Facility: CLINIC | Age: 52
End: 2022-03-03
Payer: COMMERCIAL

## 2022-03-03 DIAGNOSIS — G06.2 EXTRADURAL AND SUBDURAL ABSCESS, UNSPECIFIED: ICD-10-CM

## 2022-03-03 LAB
ALBUMIN SERPL-MCNC: 3.2 G/DL (ref 3.4–5)
ALP SERPL-CCNC: 151 U/L (ref 40–150)
ALT SERPL W P-5'-P-CCNC: 33 U/L (ref 0–70)
ANION GAP SERPL CALCULATED.3IONS-SCNC: 5 MMOL/L (ref 3–14)
AST SERPL W P-5'-P-CCNC: 11 U/L (ref 0–45)
BASOPHILS # BLD AUTO: 0.1 10E3/UL (ref 0–0.2)
BASOPHILS NFR BLD AUTO: 2 %
BILIRUB SERPL-MCNC: 0.2 MG/DL (ref 0.2–1.3)
BUN SERPL-MCNC: 10 MG/DL (ref 7–30)
CALCIUM SERPL-MCNC: 9 MG/DL (ref 8.5–10.1)
CHLORIDE BLD-SCNC: 107 MMOL/L (ref 94–109)
CO2 SERPL-SCNC: 28 MMOL/L (ref 20–32)
CREAT SERPL-MCNC: 0.72 MG/DL (ref 0.66–1.25)
CRP SERPL-MCNC: 6.1 MG/L (ref 0–8)
EOSINOPHIL # BLD AUTO: 0.5 10E3/UL (ref 0–0.7)
EOSINOPHIL NFR BLD AUTO: 6 %
ERYTHROCYTE [DISTWIDTH] IN BLOOD BY AUTOMATED COUNT: 14.3 % (ref 10–15)
GFR SERPL CREATININE-BSD FRML MDRD: >90 ML/MIN/1.73M2
GLUCOSE BLD-MCNC: 98 MG/DL (ref 70–99)
HCT VFR BLD AUTO: 36 % (ref 40–53)
HGB BLD-MCNC: 11.3 G/DL (ref 13.3–17.7)
HOLD SPECIMEN: NORMAL
IMM GRANULOCYTES # BLD: 0 10E3/UL
IMM GRANULOCYTES NFR BLD: 0 %
LYMPHOCYTES # BLD AUTO: 1.9 10E3/UL (ref 0.8–5.3)
LYMPHOCYTES NFR BLD AUTO: 24 %
MCH RBC QN AUTO: 26.9 PG (ref 26.5–33)
MCHC RBC AUTO-ENTMCNC: 31.4 G/DL (ref 31.5–36.5)
MCV RBC AUTO: 86 FL (ref 78–100)
MONOCYTES # BLD AUTO: 0.9 10E3/UL (ref 0–1.3)
MONOCYTES NFR BLD AUTO: 12 %
NEUTROPHILS # BLD AUTO: 4.5 10E3/UL (ref 1.6–8.3)
NEUTROPHILS NFR BLD AUTO: 56 %
NRBC # BLD AUTO: 0 10E3/UL
NRBC BLD AUTO-RTO: 0 /100
PLATELET # BLD AUTO: 476 10E3/UL (ref 150–450)
POTASSIUM BLD-SCNC: 4.2 MMOL/L (ref 3.4–5.3)
PROT SERPL-MCNC: 7 G/DL (ref 6.8–8.8)
RBC # BLD AUTO: 4.2 10E6/UL (ref 4.4–5.9)
SODIUM SERPL-SCNC: 140 MMOL/L (ref 133–144)
VANCOMYCIN SERPL-MCNC: 14.6 MG/L
WBC # BLD AUTO: 7.9 10E3/UL (ref 4–11)

## 2022-03-03 PROCEDURE — 80202 ASSAY OF VANCOMYCIN: CPT | Performed by: STUDENT IN AN ORGANIZED HEALTH CARE EDUCATION/TRAINING PROGRAM

## 2022-03-03 PROCEDURE — 85025 COMPLETE CBC W/AUTO DIFF WBC: CPT | Performed by: STUDENT IN AN ORGANIZED HEALTH CARE EDUCATION/TRAINING PROGRAM

## 2022-03-03 PROCEDURE — 86140 C-REACTIVE PROTEIN: CPT | Performed by: STUDENT IN AN ORGANIZED HEALTH CARE EDUCATION/TRAINING PROGRAM

## 2022-03-03 PROCEDURE — 80053 COMPREHEN METABOLIC PANEL: CPT | Performed by: STUDENT IN AN ORGANIZED HEALTH CARE EDUCATION/TRAINING PROGRAM

## 2022-03-04 ENCOUNTER — HOME INFUSION (PRE-WILLOW HOME INFUSION) (OUTPATIENT)
Dept: PHARMACY | Facility: CLINIC | Age: 52
End: 2022-03-04

## 2022-03-10 ENCOUNTER — LAB REQUISITION (OUTPATIENT)
Dept: LAB | Facility: CLINIC | Age: 52
End: 2022-03-10
Payer: COMMERCIAL

## 2022-03-10 ENCOUNTER — HOME INFUSION (PRE-WILLOW HOME INFUSION) (OUTPATIENT)
Dept: PHARMACY | Facility: CLINIC | Age: 52
End: 2022-03-10

## 2022-03-10 ENCOUNTER — VIRTUAL VISIT (OUTPATIENT)
Dept: INFECTIOUS DISEASES | Facility: CLINIC | Age: 52
End: 2022-03-10
Attending: STUDENT IN AN ORGANIZED HEALTH CARE EDUCATION/TRAINING PROGRAM
Payer: COMMERCIAL

## 2022-03-10 DIAGNOSIS — G06.2 EXTRADURAL AND SUBDURAL ABSCESS, UNSPECIFIED: ICD-10-CM

## 2022-03-10 DIAGNOSIS — M47.12 CERVICAL SPONDYLOSIS WITH MYELOPATHY: ICD-10-CM

## 2022-03-10 DIAGNOSIS — M46.22 OSTEOMYELITIS OF CERVICAL SPINE (H): Primary | ICD-10-CM

## 2022-03-10 LAB
ALBUMIN SERPL-MCNC: 3.3 G/DL (ref 3.4–5)
ALP SERPL-CCNC: 134 U/L (ref 40–150)
ALT SERPL W P-5'-P-CCNC: 30 U/L (ref 0–70)
ANION GAP SERPL CALCULATED.3IONS-SCNC: 4 MMOL/L (ref 3–14)
AST SERPL W P-5'-P-CCNC: 10 U/L (ref 0–45)
BASOPHILS # BLD AUTO: 0.1 10E3/UL (ref 0–0.2)
BASOPHILS NFR BLD AUTO: 2 %
BILIRUB SERPL-MCNC: 0.3 MG/DL (ref 0.2–1.3)
BUN SERPL-MCNC: 15 MG/DL (ref 7–30)
CALCIUM SERPL-MCNC: 8.8 MG/DL (ref 8.5–10.1)
CHLORIDE BLD-SCNC: 105 MMOL/L (ref 94–109)
CO2 SERPL-SCNC: 29 MMOL/L (ref 20–32)
CREAT SERPL-MCNC: 0.76 MG/DL (ref 0.66–1.25)
CRP SERPL-MCNC: 4.9 MG/L (ref 0–8)
EOSINOPHIL # BLD AUTO: 0.6 10E3/UL (ref 0–0.7)
EOSINOPHIL NFR BLD AUTO: 9 %
ERYTHROCYTE [DISTWIDTH] IN BLOOD BY AUTOMATED COUNT: 14.2 % (ref 10–15)
GFR SERPL CREATININE-BSD FRML MDRD: >90 ML/MIN/1.73M2
GLUCOSE BLD-MCNC: 78 MG/DL (ref 70–99)
HCT VFR BLD AUTO: 36.8 % (ref 40–53)
HGB BLD-MCNC: 12 G/DL (ref 13.3–17.7)
HOLD SPECIMEN: NORMAL
IMM GRANULOCYTES # BLD: 0 10E3/UL
IMM GRANULOCYTES NFR BLD: 0 %
LYMPHOCYTES # BLD AUTO: 2 10E3/UL (ref 0.8–5.3)
LYMPHOCYTES NFR BLD AUTO: 33 %
MCH RBC QN AUTO: 27.4 PG (ref 26.5–33)
MCHC RBC AUTO-ENTMCNC: 32.6 G/DL (ref 31.5–36.5)
MCV RBC AUTO: 84 FL (ref 78–100)
MONOCYTES # BLD AUTO: 0.8 10E3/UL (ref 0–1.3)
MONOCYTES NFR BLD AUTO: 13 %
NEUTROPHILS # BLD AUTO: 2.6 10E3/UL (ref 1.6–8.3)
NEUTROPHILS NFR BLD AUTO: 43 %
NRBC # BLD AUTO: 0 10E3/UL
NRBC BLD AUTO-RTO: 0 /100
PLATELET # BLD AUTO: 353 10E3/UL (ref 150–450)
POTASSIUM BLD-SCNC: 4.3 MMOL/L (ref 3.4–5.3)
PROT SERPL-MCNC: 6.7 G/DL (ref 6.8–8.8)
RBC # BLD AUTO: 4.38 10E6/UL (ref 4.4–5.9)
SODIUM SERPL-SCNC: 138 MMOL/L (ref 133–144)
VANCOMYCIN SERPL-MCNC: 15.3 MG/L
WBC # BLD AUTO: 6 10E3/UL (ref 4–11)

## 2022-03-10 PROCEDURE — 80202 ASSAY OF VANCOMYCIN: CPT | Performed by: STUDENT IN AN ORGANIZED HEALTH CARE EDUCATION/TRAINING PROGRAM

## 2022-03-10 PROCEDURE — 80053 COMPREHEN METABOLIC PANEL: CPT | Performed by: STUDENT IN AN ORGANIZED HEALTH CARE EDUCATION/TRAINING PROGRAM

## 2022-03-10 PROCEDURE — 99214 OFFICE O/P EST MOD 30 MIN: CPT | Mod: GT | Performed by: STUDENT IN AN ORGANIZED HEALTH CARE EDUCATION/TRAINING PROGRAM

## 2022-03-10 PROCEDURE — 80285 DRUG ASSAY VORICONAZOLE: CPT | Performed by: STUDENT IN AN ORGANIZED HEALTH CARE EDUCATION/TRAINING PROGRAM

## 2022-03-10 PROCEDURE — 85025 COMPLETE CBC W/AUTO DIFF WBC: CPT | Performed by: STUDENT IN AN ORGANIZED HEALTH CARE EDUCATION/TRAINING PROGRAM

## 2022-03-10 PROCEDURE — 86140 C-REACTIVE PROTEIN: CPT | Performed by: STUDENT IN AN ORGANIZED HEALTH CARE EDUCATION/TRAINING PROGRAM

## 2022-03-10 RX ORDER — RIFAMPIN 300 MG/1
600 CAPSULE ORAL DAILY
Qty: 180 CAPSULE | Refills: 0 | Status: SHIPPED | OUTPATIENT
Start: 2022-03-10 | End: 2022-06-08

## 2022-03-10 RX ORDER — DOXYCYCLINE HYCLATE 100 MG
100 TABLET ORAL 2 TIMES DAILY
Qty: 180 TABLET | Refills: 2 | Status: SHIPPED | OUTPATIENT
Start: 2022-03-10 | End: 2022-06-08

## 2022-03-10 NOTE — PATIENT INSTRUCTIONS
- Continue Vanc and Rifampin  - If CRP continues to trend down, and things look ok otherwise, we will plan to stop IV Vanc as planned after 3/25, and at that point switch to Doxycyline+Rifampin  - Anticipate minimum 3 months of Doxycyline+Rifampin, final duration to be decided at 3 month follow up  - I have sent a message to Ortho about your neck symptoms  - I will check in with [facility] about concerns about kidney function  - Follow up with me in 3 months

## 2022-03-10 NOTE — LETTER
3/10/2022       RE: Dave Calero  [address]  [facility] [address]     Dear Colleague,    Thank you for referring your patient, Dave Calero, to the [facility] INFECTIOUS DISEASE CLINIC [place] at [facility]. Please see a copy of my visit note below.    Dave is a 51 year old who is being evaluated via a billable video visit.      How would you like to obtain your AVS? MyChart  If the video visit is dropped, the invitation should be resent by: Send to e-mail at: [email or web address]  Will anyone else be joining your video visit? No      Video Start Time: 2:32 pm  Video-Visit Details    Type of service:  Video Visit    Video End Time:2:54 pm    Originating Location (pt. Location):Home    Distant Location (provider location):  [facility] INFECTIOUS DISEASE CLINIC [place]     Platform used for Video Visit: [facility]  Infectious Disease Clinic Note: Follow up     Patient:  Dave Calero, Date of birth 1970, Medical record number [id number]  Date of Visit:  03/11/2022         Assessment and Recommendations:       Assessment:  ID Problem List:  1. Cervical discitis with epidural abscess  -s/p ACDF (2/9) and posterior instrmentation (2/10)  -Intraop cultures with MSSA, Cutibacterium  2. Granulicatella and MSSA bacteremia        Discussion:  52yo M anitted with cervical discitis and epidural abscess, now s/p ACDF (2/9) and posterior instrumentation (2/10) with intraoperative cultures growing MSSA and Cutibacterium. Also with bacteremia Granulicatella and MSSA).  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    Today returns for  follow up, 6 weeks will be completed 3/25, CRPS have trended down to normal. Pt with no obviouss concerns from an infection standpoint but some headache and difficulty swallowing after cracking his neck in bed one night. Since these are new since postop period will reach out to Ortho to check on these earlier than planned follow up.     Pt and wife also report some concerns with kidney function per home IV pharmacist on 3/3. Cr appears normal to me, will verify.     Recommendations:   Continue Vanc and Rifampin  - If CRP continues to trend down, and things look ok otherwise, we will plan to stop IV Vanc as planned after 3/25, and at that point switch to Doxycyline+Rifampin for chronic suppression given hardware placed in infected field  - Anticipate minimum 3 months of Doxycyline+Rifampin, final duration to be decided at 3 month follow up  - I have sent a message to Ortho about neck symptoms  - I have sent a message to [facility] about concerns about kidney function  - Follow up with me in 3 months    Nazario Tapia   of Medicine  Division of Infectious Diseases         Interval events       Pt with a headache for the last two weeks, after moving his neck one night in bed and cracking his neck. Also reports some lack of sensation at surgical site, and feels like his throat almost feels like its bent, when he swallows it feels like it gets stuck about prison. Denies fevers, tolerating antibiotics, surgical site reported to be completely healed.  Pt and wife also report some concerns with kidney function per home IV pharmacist on 3/3. Denies other complaints.    Reviewed cultures    No past medical history on file.    Past Surgical History:   Procedure Laterality Date     OPTICAL TRACKING SYSTEM FUSION POSTERIOR CERVICAL THREE + LEVELS N/A 2/12/2022    Procedure: Posterior instrumented spinal fusion cervical 5 to thoracic 2, with laminectomies at Cervical 5, Cervical 6, Smithe  Tucker Oseotomy Cervical 5-6, Cervical 6-7 ;  Surgeon: Kulwinder Calixto MD;  Location: [facility] OR     OPTICAL TRACKING SYSTEM FUSION POSTERIOR SPINE LUMBAR N/A 2/9/2022    Procedure: Anterior cervical diskectomy and fusion, cervical 5 to thoracic 1 (3 levels); right or anterior iliac crest autograft harvest. ;  Surgeon: Kulwinder Calixto MD;  Location: [facility] OR     TRANSESOPHAGEAL ECHOCARDIOGRAM INTRAOPERATIVE N/A 2/15/2022    Procedure: ECHOCARDIOGRAM, TRANSESOPHAGEAL (PREM), INTRAOPERATIVE;  Surgeon: GENERIC ANESTHESIA PROVIDER;  Location: [facility] OR       No family history on file.    Social History     Social History Narrative     Not on file     Social History     Tobacco Use     Smoking status: Former Smoker     Smokeless tobacco: Never Used   Substance Use Topics     Alcohol use: None     Drug use: None       Immunization History   Administered Date(s) Administered     COVID-19,PF,Moderna 04/14/2021, 05/12/2021       Patient Active Problem List   Diagnosis     Discitis of cervical region     Epidural abscess     Hard to intubate     Cervical spondylosis with myelopathy     Encounter for screening laboratory testing for severe acute respiratory syndrome coronavirus 2 (SARS-CoV-2)       Current Outpatient Medications   Medication Sig     acetaminophen (TYLENOL) 500 MG tablet Take 1-2 tablets (500-1,000 mg) by mouth every 6 hours as needed for mild pain     amphetamine-dextroamphetamine (ADDERALL XR) 30 MG 24 hr capsule Take 30 mg by mouth daily     baclofen (LIORESAL) 10 MG tablet Take 5-10 mg by mouth 3 times daily as needed for muscle spasms     camphor-menthol-methyl sal 4-10-30 % CREA Externally apply topically 3 times daily as needed (pain)     cetirizine (ZYRTEC) 10 MG tablet Take 10 mg by mouth daily     diazepam (VALIUM) 5 MG tablet Take 1 tablet (5 mg) by mouth every 6 hours as needed for anxiety or muscle spasms     doxycycline hyclate (VIBRA-TABS) 100 MG tablet Take 1 tablet (100 mg) by mouth 2  times daily     FLUoxetine (PROZAC) 10 MG capsule Take 10 mg by mouth daily Take with the 20mg capsule for a total daily dose of 30mg     FLUoxetine (PROZAC) 20 MG capsule Take 20 mg by mouth daily Take with the 10mg capsule for a total daily dose of 30mg     gabapentin (NEURONTIN) 300 MG capsule Take 300mg by mouth at bedtime for 2 days, then increase to 600mg at bedtime daily     hydrOXYzine (ATARAX) 50 MG tablet Take 1 tablet (50 mg) by mouth every 6 hours as needed for itching or other (pain adjunct)     ibuprofen (ADVIL/MOTRIN) 200 MG tablet Take 200 mg by mouth every 4 hours as needed for mild pain     methocarbamol (ROBAXIN) 750 MG tablet Take 1 tablet (750 mg) by mouth 4 times daily as needed for muscle spasms     oxyCODONE (ROXICODONE) 5 MG tablet Take 1 tablet (5 mg) by mouth every 4 hours     polyethylene glycol (MIRALAX) 17 g packet Take 17 g by mouth daily     rifampin (RIFADIN) 300 MG capsule Take 2 capsules (600 mg) by mouth daily     senna-docusate (SENOKOT-S/PERICOLACE) 8.6-50 MG tablet Take 1 tablet by mouth 2 times daily     vancomycin 1,500 mg Inject 1,500 mg into the vein every 12 hours     VITAMIN D PO Take 1 tablet by mouth daily     No current facility-administered medications for this visit.       No Known Allergies           Physical Exam:     There were no vitals taken for this visit.    Limited Exam:  GENERAL:  well-developed, well-nourished, alert, oriented, in no acute distress.  HEENT:  Head is normocephalic, atraumatic, collar in place  EYES:  Eyes have anicteric sclerae.    LUNGS:  Breathing comfortably  SKIN:  No acute rashes.    NEUROLOGIC:  Grossly nonfocal.         Laboratory Data:     Inflammatory Markers    Recent Labs   Lab Test 03/10/22  1815 03/03/22  1810 02/24/22  1900 02/17/22  0845 02/09/22  1438   CRP 4.9 6.1 14.0* 53.2* 74.0*       Metabolic Studies       Recent Labs   Lab Test 03/10/22  1815 03/03/22  1810 02/24/22  1900 02/17/22  0845 02/15/22  1211 02/14/22  1803  02/14/22  0642 02/13/22  0634 02/12/22  1153    140 139 139  --   --  135 136 138   POTASSIUM 4.3 4.2 4.2 3.8  --   --  4.4 3.9 3.8   CHLORIDE 105 107 106 104  --   --  104 104  --    CO2 29 28 28 30  --   --  27 28  --    ANIONGAP 4 5 5 5  --   --  4 4  --    BUN 15 10 13 10  --   --  7 7  --    CR 0.76 0.72 0.68 0.58*  --   --  0.69 0.66  --    GFRESTIMATED >90 >90 >90 >90  --   --  >90 >90  --    GLC 78 98 122* 101*   < >  --  129*  128* 130*  130* 117*   VIANEY 8.8 9.0 8.6 9.5  --   --  9.3 8.8  --    LACT  --   --   --   --   --  1.3  --   --  0.8    < > = values in this interval not displayed.       Hematology Studies      Recent Labs   Lab Test 03/10/22  1815 03/03/22  1810 02/24/22  1900 02/17/22  0845 02/14/22  0642 02/13/22  0634   WBC 6.0 7.9 9.2 7.6 12.5* 11.9*   HGB 12.0* 11.3* 10.4* 9.1* 9.1* 8.2*   HCT 36.8* 36.0* 33.2* 28.5* 29.6* 26.5*    476* 515* 603* 581* 527*       Clotting Studies    Recent Labs   Lab Test 02/09/22  1438   INR 0.98       Urine Studies     Recent Labs   Lab Test 02/10/22  1918   URINEPH 5.5   NITRITE Negative   LEUKEST Negative   WBCU 1       Imaging:  Results for orders placed or performed during the hospital encounter of 02/09/22   XR Surgery DEVORA Fluoro L/T 5 Min w Stills    Narrative    Exam: XR SURGERY DEVORA FLUORO LESS THAN 5 MIN W ARTEM, 2/9/2022 11:33  PM    Provided History: Anterior cervical discectomy and fusion, possible  anterior plate fixation C5-T1, right or left anterior iliac crest  autograft harvest  ICD-10:    Comparison: None.    Technique: Intraoperative imaging.    Findings:    A single lateral intraoperative image from 11:13 PM 2/9/2022 shows  linear surgical probe tip projecting over the anterior aspect of the  inferior endplate of C6.    Additional surgical clamp projects over the C6-7 intervertebral disc  space. Endotracheal tube is partially imaged.       Impression    Impression: Instrumentation projecting toward anterior aspect of  the  inferior endplate of C6.    The above indicated surgical level was reported to operating room  personnel, specifically Dr Calixto, via telephone by Dr Andujar  on 2/9/2022 11:33 PM.    I have personally reviewed the examination and initial interpretation  and I agree with the findings.    LAUREN MEAD MD         SYSTEM ID:  [id number]   MR Cervical Spine w/o & w Contrast    Narrative    MR CERVICAL SPINE W/O & W CONTRAST 2/10/2022 3:00 PM    Provided History: Epidural abscess; s/p acdf  MR CERVICAL SPINE W/O & W CONTRAST 2/10/2022 3:00 PM    Provided History: Epidural abscess; s/p acdf    Comparison: Same day CT cervical spine    Technique: Sagittal T1-weighted, sagittal T2-weighted, sagittal  diffusion weighted, axial T2-weighted images of the cervical spine  were obtained without intravenous contrast. Following intravenous  administration of gadolinium, axial and sagittal T1-weighted images  with fat saturation were also obtained.    Contrast: 7.5ML iv Gadavist    Findings:  Minimal anterolisthesis C2-3.    Postsurgical changes of anterior fusion with interbody devices at  C5-6, C7-T1, and T1-2.      Bone marrow edema and associated enhancement involving the C6-T2  vertebral bodies. Bone marrow edema extends into T1 and T2 posterior  elements    Extensive retropharyngeal/prevertebral soft tissue edema and  enhancement extending from C5 down to T3 level. Anterior and posterior  epidural T2 hyperintense signal with associated enhancement extending  from C5-6 down to T2-3. Edema and enhancement extends into bilateral  neural foramina from C6 to T2. Associated severe spinal canal stenosis  at C6-7 at C7-T1. Edema and associated enhancement in the interspinous  region at C7-T1 and T1-T2 levels. Moderate spinal canal stenosis C5-6.  No definite epidural fluid collection.    Multilevel disc height narrowing and disc desiccation. On a level by  level basis;    C2-3: Bilateral uncovertebral spurring and facet  hypertrophy resulting  in mild bilateral neural stenosis. No spinal canal stenosis.    C3-4: Disc osteophyte complex and bilateral uncinate spurring,  eccentric to the left. Left greater than right facet hypertrophy.  Severe left neural foraminal stenosis. Mild right neural foraminal  stenosis. No spinal canal stenosis.    C4-5: Uncovertebral spurring in the right greater than left facet  hypertrophy. Mild to moderate right and mild left neural foraminal  stenosis. No spinal canal stenosis.    C5-6: Fusion level. Severe bilateral neural foraminal and moderate  spinal canal stenosis secondary to uncovertebral spurring and  superimposed epidural inflammatory findings.    C6-7: Disc osteophyte complex and bilateral uncinate spurring.  Bilateral facet hypertrophy. Epidural inflammatory/infectious findings  contribute to severe bilateral neural foraminal and severe spinal  canal stenosis.     C7-T1: Fusion level. Epidural inflammatory/infectious findings  contribute to severe bilateral neural foraminal and severe spinal  canal stenosis      Impression    Impression:   1. Early postsurgical changes of instrumented posterior spinal fusion  with interbody devices at C5-C6, C7-T1 and T1-T2.   2. Bone marrow edema and associated enhancement involving the C6-T2  vertebrae, consistent with osteomyelitis. Epidural  phlegmon extending  from phlegmon C5-6 down to T2-3. There are several questionable tiny  fluid pockets pockets, for example in the anterior epidural space at  C6 level. However it is mostly phlegmon. Edema and enhancement extends  into bilateral neural foramina from C6 to T2. Associated severe spinal  canal stenosis at C6-7 at C7-T1 and moderate spinal canal stenosis  C5-6.  3. Extensive retropharyngeal/prevertebral soft tissue edema and  enhancement extending from C5 down to T3 level. This is combination of  postsurgical changes and inflammatory/infectious findings.     I have personally reviewed the examination and  initial interpretation  and I agree with the findings.    ANDRE ESTRADA MD         SYSTEM ID:  [id number]   CT Cervical Spine w/o Contrast    Narrative    CT CERVICAL SPINE W/O CONTRAST 2/10/2022 12:06 PM    Provided History: Epidural abscess; s/p anterior fusion; to OR Friday  or Saturday for posterior approach    Comparison: None available    Technique: Using multidetector thin collimation helical acquisition  technique, axial, coronal and sagittal CT images through the cervical  spine were obtained without intravenous contrast.     Findings:  Postsurgical changes of anterior cervical discectomy and fusion with  interbody devices at C5-6, C7-T1 and T1-2. Associated postoperative  prevertebral soft tissue edema and emphysema. The cervical vertebrae  are normally aligned. Trace anterolisthesis of C3 on C4. Straightening  of the expected cervical lordosis. No acute fracture or subluxation.  Heterogeneous sclerotic changes of the C5 and C6 vertebral bodies.  Multilevel degenerative changes with osteophytic and uncovertebral  spurring, facet arthropathy, and moderate disc space narrowing at C6-7  The findings on a level by level basis are as follows:    C2-3:  Bilateral uncovertebral and facet hypertrophy, left greater  than right. Mild left neuroforaminal narrowing. No right  neuroforaminal or spinal canal stenosis.    C3-4:  Disc osteophyte complex. Bilateral uncovertebral and facet  hypertrophy, left greater than right. Mild to moderate left  neuroforaminal narrowing. No right neuroforaminal or spinal canal  stenosis    C4-5:  Bilateral uncovertebral and facet hypertrophy. No spinal canal  or neural foraminal stenosis.    C5-6:  Bilateral uncovertebral and facet hypertrophy, right greater  the left. Moderate bilateral neural foraminal narrowing. No spinal  canal stenosis.    C6-7:  Disc osteophyte complex. Bilateral uncovertebral and facet  hypertrophy. Mild-to-moderate bilateral neural foraminal narrowing.  No  spinal canal stenosis.    C7-T1:  No spinal canal or neural foraminal stenosis.       Impression    Impression:   1. Postsurgical changes of anterior cervical discectomy and fusion  C5-6 and C7-T2 with associated postoperative prevertebral soft tissue  edema and emphysema. Consider MRI for evaluation of soft tissue  abscess and osteomyelitis.  2. Multilevel cervical spondylosis as described above.    I have personally reviewed the examination and initial interpretation  and I agree with the findings.    HOME ROSE MD         SYSTEM ID:  [id number]   XR Surgery DEOVRA L/T 5 Min Fluoro w Stills    Narrative    This exam was marked as non-reportable because it will not be read by a   radiologist or a [place] non-radiologist provider.         XR Cervical Spine 2/3 Views    Narrative    XR CERVICAL SPINE 2/3 VWS 2/14/2022 8:29 AM    Provided History: status post Anterior and posterior cervical fusion   ICD-10:    Comparison: MRI cervical spine 2/10/2022. CT cervical spine 2/10/2022.    Findings: AP and lateral views of the cervical spine were obtained.  Posterior fusion instrumentation from C5-T2. Intervening lower  cervical laminectomies. Interbody spacers at C5-6, C7-T1, and T1-2.  Straightening of cervical lordosis. Focal kyphosis at C7-T1 and grade  1 anterolisthesis of C7 on T1. Moderate prevertebral edema. Multilevel  degenerative changes and cutting endplate osteophytosis, uncinate  hypertrophy, and facet hypertrophy.    No mass in the visualized lung apices.      Impression    Impression:  1. New posterior fusion instrumentation from C5-T2.  2. Persistent changes of ACDF at C5-6, C7-T1, and T1-2.  3. Persistent prevertebral edema.         DIANDRA BROWN MD         SYSTEM ID:  [id number]   Echo Complete     Value    LVEF  55-60%    Narrative    [id number]  [id number]  [id number]  [id number]^[place]^CARLO^LANA     [facility],[place]  Echocardiography Laboratory  [address] [place]  [place] [address]     Name: SONYA ARAUJO  MRN: [id number]  : 1970  Study Date: 2022 02:35 PM  Age: 51 yrs  Gender: Male  Patient Location: [facility]  Reason For Study: Endocarditis  Ordering Physician: CARLO GOFF  Performed By: Rachel Dunbar RDCS     BSA: 1.9 m2  Height: 69 in  Weight: 174 lb  HR: 109  BP: 135/80 mmHg  ______________________________________________________________________________  Procedure  Complete Portable Echo Adult. Contrast Optison. Patient was given 5 ml mixture  of 3 ml Optison and 6 ml saline. 4 ml wasted.  ______________________________________________________________________________  Interpretation Summary  Global and regional left ventricular function is normal with an EF of 55-60%.  The right ventricle is normal in function and size.  No significant valvular abnormality.  No pericardial effusion is present.  IVC diameter <2.1 cm collapsing >50% with sniff suggests a normal RA pressure  of 3 mmHg.  There is no prior study for direct comparison.  ______________________________________________________________________________  Left Ventricle  Left ventricular size is normal. Global and regional left ventricular function  is normal with an EF of 55-60%. Thickening of the anterobasal septum is  present. Diastolic function not assessed due to tachycardia. No regional wall  motion abnormalities are seen.     Right Ventricle  The right ventricle is normal size. Global right ventricular function is  normal.     Atria  Both atria appear normal.     Mitral Valve  The mitral valve is normal. Trace mitral insufficiency is present.     Tricuspid Valve  The tricuspid valve is normal. Trace tricuspid insufficiency is present. The  peak velocity of the tricuspid regurgitant jet is not obtainable.     Pulmonic Valve  On Doppler interrogation, there is no significant stenosis or regurgitation.  The valve leaflets are not well visualized.     Vessels  The aorta root is  normal. The thoracic aorta is normal. IVC diameter <2.1 cm  collapsing >50% with sniff suggests a normal RA pressure of 3 mmHg.     Pericardium  No pericardial effusion is present.     Compared to Previous Study  There is no prior study for direct comparison.     ______________________________________________________________________________  MMode/2D Measurements & Calculations  IVSd: 1.3 cm  LVIDd: 3.9 cm  LVIDs: 3.1 cm  LVPWd: 0.82 cm  FS: 21.3 %     LV mass(C)d: 132.4 grams  LV mass(C)dI: 68.0 grams/m2  Ao root diam: 2.9 cm  asc Aorta Diam: 2.7 cm  LVOT diam: 2.0 cm  LVOT area: 3.1 cm2  LA Volume (BP): 24.0 ml  LA Volume Index (BP): 12.3 ml/m2  RWT: 0.42     Doppler Measurements & Calculations  PA acc time: 0.08 sec     ______________________________________________________________________________  Report approved by: Farhan Stevenson 2022 03:30 PM         Transesophageal Echocardiogram     Value    LVEF  60-65%    Narrative    [id number]  [id number]  [id number]  [id number]^[place]^CARLO^LANA     [facility],[place]  Echocardiography Laboratory  [address] [place] [id number]     Name: SONYA ARAUJO  MRN: [id number]  : 1970  Study Date: 02/15/2022 01:42 PM  Age: 51 yrs  Gender: Male  Patient Location: [facility]  Reason For Study: 2nd degree AV Block, Endocarditis  Ordering Physician: CARLO GOFF  Performed By: Son Vale     Attestation  I was present during PREM probe placement by the fellow, Son Vale. I  personally viewed the imaging and agree with the interpretation and report as  documented by the fellow.  ______________________________________________________________________________  Interpretation Summary  Normal biventricular function.  No valvular abnormalities or vegetations noted.  No pericardial effusion present.  ______________________________________________________________________________  Procedure  Intraoperative  Transesophageal Echocardiogram with color and spectral Doppler  performed. 3D image acquisition, reconstruction, and real-time interpretation  was performed. Procedure location Operating Room. Informed consent for  Transesophegeal echo obtained. PREM Probe #63 was used during the procedure.  Sedation, endotracheal intubation, and mechanical ventilation were initiated  prior to the PREM and were monitored by anesthesia. The Transducer was inserted  without difficulty . Complications None. The patient tolerated the procedure  well. The patient's rhythm is sinus tachycardia. Adequate quality two-  dimensional was performed and interpreted. Adequate quality color and spectral  Doppler were performed and interpreted.     Left Ventricle  Left ventricular size is normal. Global and regional left ventricular function  is normal with an EF of 60-65%.     Right Ventricle  The right ventricle is normal size. Global right ventricular function is  normal.     Atria  Both atria appear normal. The left atrial appendage is normal. It is free of  spontaneous echo contrast and thrombus. No left atrial mass or thrombus  visualized. The atrial septum is intact as assessed by color Doppler .     Mitral Valve  The mitral valve is normal. Trace mitral insufficiency is present.     Aortic Valve  Aortic valve is normal in structure and function. The aortic valve is  tricuspid. No aortic regurgitation is present.     Tricuspid Valve  The tricuspid valve is normal. Trace tricuspid insufficiency is present.     Pulmonic Valve  The pulmonic valve is normal.     Vessels  The aorta root is normal.     Pericardium  No pericardial effusion is present.     Miscellaneous  Transgastric imaging was not performed.     ______________________________________________________________________________  Report approved by: Catherine Meraz 02/15/2022 03:20 PM     ______________________________________________________________________________

## 2022-03-10 NOTE — PROGRESS NOTES
Dave is a 51 year old who is being evaluated via a billable video visit.      How would you like to obtain your AVS? MyChart  If the video visit is dropped, the invitation should be resent by: Send to e-mail at: [email or web address]  Will anyone else be joining your video visit? No      Video Start Time: 2:32 pm  Video-Visit Details    Type of service:  Video Visit    Video End Time:2:54 pm    Originating Location (pt. Location):Home    Distant Location (provider location):  [facility] INFECTIOUS DISEASE CLINIC [place]     Platform used for Video Visit: Anulex      [facility]  Infectious Disease Clinic Note: Follow up     Patient:  Dave Calero, Date of birth 1970, Medical record number [id number]  Date of Visit:  03/11/2022         Assessment and Recommendations:       Assessment:  ID Problem List:  1. Cervical discitis with epidural abscess  -s/p ACDF (2/9) and posterior instrmentation (2/10)  -Intraop cultures with MSSA, Cutibacterium  2. Granulicatella and MSSA bacteremia        Discussion:  52yo M anitted with cervical discitis and epidural abscess, now s/p ACDF (2/9) and posterior instrumentation (2/10) with intraoperative cultures growing MSSA and Cutibacterium. Also with bacteremia Granulicatella and MSSA).  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    Today returns for follow up, 6 weeks will be completed 3/25, CRPS have trended down to normal. Pt with no obviouss concerns from an infection standpoint but some headache and difficulty swallowing after cracking his neck in bed one night. Since these are new since postop period will reach out to Ortho to check on these earlier than planned follow up.     Pt and  wife also report some concerns with kidney function per home IV pharmacist on 3/3. Cr appears normal to me, will verify.     Recommendations:   Continue Vanc and Rifampin  - If CRP continues to trend down, and things look ok otherwise, we will plan to stop IV Vanc as planned after 3/25, and at that point switch to Doxycyline+Rifampin for chronic suppression given hardware placed in infected field  - Anticipate minimum 3 months of Doxycyline+Rifampin, final duration to be decided at 3 month follow up  - I have sent a message to [employer] about neck symptoms  - I have sent a message to [facility] about concerns about kidney function  - Follow up with me in 3 months    Nazario Tapia   of Medicine  Division of Infectious Diseases         Interval events       Pt with a headache for the last two weeks, after moving his neck one night in bed and cracking his neck. Also reports some lack of sensation at surgical site, and feels like his throat almost feels like its bent, when he swallows it feels like it gets stuck about nursing home. Denies fevers, tolerating antibiotics, surgical site reported to be completely healed.  Pt and wife also report some concerns with kidney function per home IV pharmacist on 3/3. Denies other complaints.    Reviewed cultures    No past medical history on file.    Past Surgical History:   Procedure Laterality Date     OPTICAL TRACKING SYSTEM FUSION POSTERIOR CERVICAL THREE + LEVELS N/A 2/12/2022    Procedure: Posterior instrumented spinal fusion cervical 5 to thoracic 2, with laminectomies at Cervical 5, Cervical 6, Smithe Tucker Oseotomy Cervical 5-6, Cervical 6-7 ;  Surgeon: Kulwinder Calixto MD;  Location:  OR     OPTICAL TRACKING SYSTEM FUSION POSTERIOR SPINE LUMBAR N/A 2/9/2022    Procedure: Anterior cervical diskectomy and fusion, cervical 5 to thoracic 1 (3 levels); right or anterior iliac crest autograft harvest. ;  Surgeon: Kulwinder Calixto  MD Oscar;  Location: [facility] OR     TRANSESOPHAGEAL ECHOCARDIOGRAM INTRAOPERATIVE N/A 2/15/2022    Procedure: ECHOCARDIOGRAM, TRANSESOPHAGEAL (PREM), INTRAOPERATIVE;  Surgeon: GENERIC ANESTHESIA PROVIDER;  Location: [facility] OR       No family history on file.    Social History     Social History Narrative     Not on file     Social History     Tobacco Use     Smoking status: Former Smoker     Smokeless tobacco: Never Used   Substance Use Topics     Alcohol use: None     Drug use: None       Immunization History   Administered Date(s) Administered     COVID-19,PF,Moderna 04/14/2021, 05/12/2021       Patient Active Problem List   Diagnosis     Discitis of cervical region     Epidural abscess     Hard to intubate     Cervical spondylosis with myelopathy     Encounter for screening laboratory testing for severe acute respiratory syndrome coronavirus 2 (SARS-CoV-2)       Current Outpatient Medications   Medication Sig     acetaminophen (TYLENOL) 500 MG tablet Take 1-2 tablets (500-1,000 mg) by mouth every 6 hours as needed for mild pain     amphetamine-dextroamphetamine (ADDERALL XR) 30 MG 24 hr capsule Take 30 mg by mouth daily     baclofen (LIORESAL) 10 MG tablet Take 5-10 mg by mouth 3 times daily as needed for muscle spasms     camphor-menthol-methyl sal 4-10-30 % CREA Externally apply topically 3 times daily as needed (pain)     cetirizine (ZYRTEC) 10 MG tablet Take 10 mg by mouth daily     diazepam (VALIUM) 5 MG tablet Take 1 tablet (5 mg) by mouth every 6 hours as needed for anxiety or muscle spasms     doxycycline hyclate (VIBRA-TABS) 100 MG tablet Take 1 tablet (100 mg) by mouth 2 times daily     FLUoxetine (PROZAC) 10 MG capsule Take 10 mg by mouth daily Take with the 20mg capsule for a total daily dose of 30mg     FLUoxetine (PROZAC) 20 MG capsule Take 20 mg by mouth daily Take with the 10mg capsule for a total daily dose of 30mg     gabapentin (NEURONTIN) 300 MG capsule Take 300mg by mouth at bedtime for 2 days, then  increase to 600mg at bedtime daily     hydrOXYzine (ATARAX) 50 MG tablet Take 1 tablet (50 mg) by mouth every 6 hours as needed for itching or other (pain adjunct)     ibuprofen (ADVIL/MOTRIN) 200 MG tablet Take 200 mg by mouth every 4 hours as needed for mild pain     methocarbamol (ROBAXIN) 750 MG tablet Take 1 tablet (750 mg) by mouth 4 times daily as needed for muscle spasms     oxyCODONE (ROXICODONE) 5 MG tablet Take 1 tablet (5 mg) by mouth every 4 hours     polyethylene glycol (MIRALAX) 17 g packet Take 17 g by mouth daily     rifampin (RIFADIN) 300 MG capsule Take 2 capsules (600 mg) by mouth daily     senna-docusate (SENOKOT-S/PERICOLACE) 8.6-50 MG tablet Take 1 tablet by mouth 2 times daily     vancomycin 1,500 mg Inject 1,500 mg into the vein every 12 hours     VITAMIN D PO Take 1 tablet by mouth daily     No current facility-administered medications for this visit.       No Known Allergies           Physical Exam:     There were no vitals taken for this visit.    Limited Exam:  GENERAL:  well-developed, well-nourished, alert, oriented, in no acute distress.  HEENT:  Head is normocephalic, atraumatic, collar in place  EYES:  Eyes have anicteric sclerae.    LUNGS:  Breathing comfortably  SKIN:  No acute rashes.    NEUROLOGIC:  Grossly nonfocal.         Laboratory Data:     Inflammatory Markers    Recent Labs   Lab Test 03/10/22  1815 03/03/22  1810 02/24/22  1900 02/17/22  0845 02/09/22  1438   CRP 4.9 6.1 14.0* 53.2* 74.0*       Metabolic Studies       Recent Labs   Lab Test 03/10/22  1815 03/03/22  1810 02/24/22  1900 02/17/22  0845 02/15/22  1211 02/14/22  1803 02/14/22  0642 02/13/22  0634 02/12/22  1153    140 139 139  --   --  135 136 138   POTASSIUM 4.3 4.2 4.2 3.8  --   --  4.4 3.9 3.8   CHLORIDE 105 107 106 104  --   --  104 104  --    CO2 29 28 28 30  --   --  27 28  --    ANIONGAP 4 5 5 5  --   --  4 4  --    BUN 15 10 13 10  --   --  7 7  --    CR 0.76 0.72 0.68 0.58*  --   --  0.69 0.66   --    GFRESTIMATED >90 >90 >90 >90  --   --  >90 >90  --    GLC 78 98 122* 101*   < >  --  129*  128* 130*  130* 117*   VIANEY 8.8 9.0 8.6 9.5  --   --  9.3 8.8  --    LACT  --   --   --   --   --  1.3  --   --  0.8    < > = values in this interval not displayed.       Hematology Studies      Recent Labs   Lab Test 03/10/22  1815 03/03/22  1810 02/24/22  1900 02/17/22  0845 02/14/22  0642 02/13/22  0634   WBC 6.0 7.9 9.2 7.6 12.5* 11.9*   HGB 12.0* 11.3* 10.4* 9.1* 9.1* 8.2*   HCT 36.8* 36.0* 33.2* 28.5* 29.6* 26.5*    476* 515* 603* 581* 527*       Clotting Studies    Recent Labs   Lab Test 02/09/22  1438   INR 0.98       Urine Studies     Recent Labs   Lab Test 02/10/22  1918   URINEPH 5.5   NITRITE Negative   LEUKEST Negative   WBCU 1       Imaging:  Results for orders placed or performed during the hospital encounter of 02/09/22   XR Surgery DEVORA Fluoro L/T 5 Min w Stills    Narrative    Exam: XR SURGERY DEVORA FLUORO LESS THAN 5 MIN W STILLS, 2/9/2022 11:33  PM    Provided History: Anterior cervical discectomy and fusion, possible  anterior plate fixation C5-T1, right or left anterior iliac crest  autograft harvest  ICD-10:    Comparison: None.    Technique: Intraoperative imaging.    Findings:    A single lateral intraoperative image from 11:13 PM 2/9/2022 shows  linear surgical probe tip projecting over the anterior aspect of the  inferior endplate of C6.    Additional surgical clamp projects over the C6-7 intervertebral disc  space. Endotracheal tube is partially imaged.       Impression    Impression: Instrumentation projecting toward anterior aspect of the  inferior endplate of C6.    The above indicated surgical level was reported to operating room  personnel, specifically Dr Calixto, via telephone by Dr Andujar  on 2/9/2022 11:33 PM.    I have personally reviewed the examination and initial interpretation  and I agree with the findings.    LAUREN MEAD MD         SYSTEM ID:  [id number]   MR  Cervical Spine w/o & w Contrast    Narrative    MR CERVICAL SPINE W/O & W CONTRAST 2/10/2022 3:00 PM    Provided History: Epidural abscess; s/p acdf  MR CERVICAL SPINE W/O & W CONTRAST 2/10/2022 3:00 PM    Provided History: Epidural abscess; s/p acdf    Comparison: Same day CT cervical spine    Technique: Sagittal T1-weighted, sagittal T2-weighted, sagittal  diffusion weighted, axial T2-weighted images of the cervical spine  were obtained without intravenous contrast. Following intravenous  administration of gadolinium, axial and sagittal T1-weighted images  with fat saturation were also obtained.    Contrast: 7.5ML iv Gadavist    Findings:  Minimal anterolisthesis C2-3.    Postsurgical changes of anterior fusion with interbody devices at  C5-6, C7-T1, and T1-2.      Bone marrow edema and associated enhancement involving the C6-T2  vertebral bodies. Bone marrow edema extends into T1 and T2 posterior  elements    Extensive retropharyngeal/prevertebral soft tissue edema and  enhancement extending from C5 down to T3 level. Anterior and posterior  epidural T2 hyperintense signal with associated enhancement extending  from C5-6 down to T2-3. Edema and enhancement extends into bilateral  neural foramina from C6 to T2. Associated severe spinal canal stenosis  at C6-7 at C7-T1. Edema and associated enhancement in the interspinous  region at C7-T1 and T1-T2 levels. Moderate spinal canal stenosis C5-6.  No definite epidural fluid collection.    Multilevel disc height narrowing and disc desiccation. On a level by  level basis;    C2-3: Bilateral uncovertebral spurring and facet hypertrophy resulting  in mild bilateral neural stenosis. No spinal canal stenosis.    C3-4: Disc osteophyte complex and bilateral uncinate spurring,  eccentric to the left. Left greater than right facet hypertrophy.  Severe left neural foraminal stenosis. Mild right neural foraminal  stenosis. No spinal canal stenosis.    C4-5: Uncovertebral spurring  in the right greater than left facet  hypertrophy. Mild to moderate right and mild left neural foraminal  stenosis. No spinal canal stenosis.    C5-6: Fusion level. Severe bilateral neural foraminal and moderate  spinal canal stenosis secondary to uncovertebral spurring and  superimposed epidural inflammatory findings.    C6-7: Disc osteophyte complex and bilateral uncinate spurring.  Bilateral facet hypertrophy. Epidural inflammatory/infectious findings  contribute to severe bilateral neural foraminal and severe spinal  canal stenosis.     C7-T1: Fusion level. Epidural inflammatory/infectious findings  contribute to severe bilateral neural foraminal and severe spinal  canal stenosis      Impression    Impression:   1. Early postsurgical changes of instrumented posterior spinal fusion  with interbody devices at C5-C6, C7-T1 and T1-T2.   2. Bone marrow edema and associated enhancement involving the C6-T2  vertebrae, consistent with osteomyelitis. Epidural  phlegmon extending  from phlegmon C5-6 down to T2-3. There are several questionable tiny  fluid pockets pockets, for example in the anterior epidural space at  C6 level. However it is mostly phlegmon. Edema and enhancement extends  into bilateral neural foramina from C6 to T2. Associated severe spinal  canal stenosis at C6-7 at C7-T1 and moderate spinal canal stenosis  C5-6.  3. Extensive retropharyngeal/prevertebral soft tissue edema and  enhancement extending from C5 down to T3 level. This is combination of  postsurgical changes and inflammatory/infectious findings.     I have personally reviewed the examination and initial interpretation  and I agree with the findings.    ANDRE ESTRADA MD         SYSTEM ID:  [id number]   CT Cervical Spine w/o Contrast    Narrative    CT CERVICAL SPINE W/O CONTRAST 2/10/2022 12:06 PM    Provided History: Epidural abscess; s/p anterior fusion; to OR Friday  or Saturday for posterior approach    Comparison: None  available    Technique: Using multidetector thin collimation helical acquisition  technique, axial, coronal and sagittal CT images through the cervical  spine were obtained without intravenous contrast.     Findings:  Postsurgical changes of anterior cervical discectomy and fusion with  interbody devices at C5-6, C7-T1 and T1-2. Associated postoperative  prevertebral soft tissue edema and emphysema. The cervical vertebrae  are normally aligned. Trace anterolisthesis of C3 on C4. Straightening  of the expected cervical lordosis. No acute fracture or subluxation.  Heterogeneous sclerotic changes of the C5 and C6 vertebral bodies.  Multilevel degenerative changes with osteophytic and uncovertebral  spurring, facet arthropathy, and moderate disc space narrowing at C6-7  The findings on a level by level basis are as follows:    C2-3:  Bilateral uncovertebral and facet hypertrophy, left greater  than right. Mild left neuroforaminal narrowing. No right  neuroforaminal or spinal canal stenosis.    C3-4:  Disc osteophyte complex. Bilateral uncovertebral and facet  hypertrophy, left greater than right. Mild to moderate left  neuroforaminal narrowing. No right neuroforaminal or spinal canal  stenosis    C4-5:  Bilateral uncovertebral and facet hypertrophy. No spinal canal  or neural foraminal stenosis.    C5-6:  Bilateral uncovertebral and facet hypertrophy, right greater  the left. Moderate bilateral neural foraminal narrowing. No spinal  canal stenosis.    C6-7:  Disc osteophyte complex. Bilateral uncovertebral and facet  hypertrophy. Mild-to-moderate bilateral neural foraminal narrowing. No  spinal canal stenosis.    C7-T1:  No spinal canal or neural foraminal stenosis.       Impression    Impression:   1. Postsurgical changes of anterior cervical discectomy and fusion  C5-6 and C7-T2 with associated postoperative prevertebral soft tissue  edema and emphysema. Consider MRI for evaluation of soft tissue  abscess and  osteomyelitis.  2. Multilevel cervical spondylosis as described above.    I have personally reviewed the examination and initial interpretation  and I agree with the findings.    HOME ROSE MD         SYSTEM ID:  [id number]   XR Surgery DEVORA L/T 5 Min Fluoro w Stills    Narrative    This exam was marked as non-reportable because it will not be read by a   radiologist or a [facility] non-radiologist provider.         XR Cervical Spine 2/3 Views    Narrative    XR CERVICAL SPINE 2/3 VWS 2022 8:29 AM    Provided History: status post Anterior and posterior cervical fusion   ICD-10:    Comparison: MRI cervical spine 2/10/2022. CT cervical spine 2/10/2022.    Findings: AP and lateral views of the cervical spine were obtained.  Posterior fusion instrumentation from C5-T2. Intervening lower  cervical laminectomies. Interbody spacers at C5-6, C7-T1, and T1-2.  Straightening of cervical lordosis. Focal kyphosis at C7-T1 and grade  1 anterolisthesis of C7 on T1. Moderate prevertebral edema. Multilevel  degenerative changes and cutting endplate osteophytosis, uncinate  hypertrophy, and facet hypertrophy.    No mass in the visualized lung apices.      Impression    Impression:  1. New posterior fusion instrumentation from C5-T2.  2. Persistent changes of ACDF at C5-6, C7-T1, and T1-2.  3. Persistent prevertebral edema.         DIANDRA BROWN MD         SYSTEM ID:  [id number]   Echo Complete     Value    LVEF  55-60%    Narrative    [id number]  [id number]  [id number]  [id number]^[place]^CARLO^LANA     [facility],[place]  Echocardiography Laboratory  [address] [place] [id number]     Name: SONYA ARAUJO  MRN: [id number]  : 1970  Study Date: 2022 02:35 PM  Age: 51 yrs  Gender: Male  Patient [facility]  Reason For Study: Endocarditis  Ordering Physician: CARLO GOFF  Performed By: Rachel Dunbar RDCS     BSA: 1.9 m2  Height: 69 in  Weight: 174 lb  HR:  109  BP: 135/80 mmHg  ______________________________________________________________________________  Procedure  Complete Portable Echo Adult. Contrast Optison. Patient was given 5 ml mixture  of 3 ml Optison and 6 ml saline. 4 ml wasted.  ______________________________________________________________________________  Interpretation Summary  Global and regional left ventricular function is normal with an EF of 55-60%.  The right ventricle is normal in function and size.  No significant valvular abnormality.  No pericardial effusion is present.  IVC diameter <2.1 cm collapsing >50% with sniff suggests a normal RA pressure  of 3 mmHg.  There is no prior study for direct comparison.  ______________________________________________________________________________  Left Ventricle  Left ventricular size is normal. Global and regional left ventricular function  is normal with an EF of 55-60%. Thickening of the anterobasal septum is  present. Diastolic function not assessed due to tachycardia. No regional wall  motion abnormalities are seen.     Right Ventricle  The right ventricle is normal size. Global right ventricular function is  normal.     Atria  Both atria appear normal.     Mitral Valve  The mitral valve is normal. Trace mitral insufficiency is present.     Tricuspid Valve  The tricuspid valve is normal. Trace tricuspid insufficiency is present. The  peak velocity of the tricuspid regurgitant jet is not obtainable.     Pulmonic Valve  On Doppler interrogation, there is no significant stenosis or regurgitation.  The valve leaflets are not well visualized.     Vessels  The aorta root is normal. The thoracic aorta is normal. IVC diameter <2.1 cm  collapsing >50% with sniff suggests a normal RA pressure of 3 mmHg.     Pericardium  No pericardial effusion is present.     Compared to Previous Study  There is no prior study for direct comparison.      ______________________________________________________________________________  MMode/2D Measurements & Calculations  IVSd: 1.3 cm  LVIDd: 3.9 cm  LVIDs: 3.1 cm  LVPWd: 0.82 cm  FS: 21.3 %     LV mass(C)d: 132.4 grams  LV mass(C)dI: 68.0 grams/m2  Ao root diam: 2.9 cm  asc Aorta Diam: 2.7 cm  LVOT diam: 2.0 cm  LVOT area: 3.1 cm2  LA Volume (BP): 24.0 ml  LA Volume Index (BP): 12.3 ml/m2  RWT: 0.42     Doppler Measurements & Calculations  PA acc time: 0.08 sec     ______________________________________________________________________________  Report approved by: Farhan Stevenson 2022 03:30 PM         Transesophageal Echocardiogram     Value    LVEF  60-65%    Narrative    [id number]  Critical access hospital  [id number]  [id number]^[place]^CARLO^LANA     [facility],[place]  Echocardiography Laboratory  [address] [place] [id number]     Name: SONYA ARAUJO  MRN: [id number]  : 1970  Study Date: 02/15/2022 01:42 PM  Age: 51 yrs  Gender: Male  Patient [facility]: [facility]  Reason For Study: 2nd degree AV Block, Endocarditis  Ordering Physician: CARLO GOFF  Performed By: Son Vale     Attestation  I was present during PREM probe placement by the fellow, Son Vale. I  personally viewed the imaging and agree with the interpretation and report as  documented by the fellow.  ______________________________________________________________________________  Interpretation Summary  Normal biventricular function.  No valvular abnormalities or vegetations noted.  No pericardial effusion present.  ______________________________________________________________________________  Procedure  Intraoperative Transesophageal Echocardiogram with color and spectral Doppler  performed. 3D image acquisition, reconstruction, and real-time interpretation  was performed. Procedure location Operating Room. Informed consent for  Transesophegeal echo obtained. PREM Probe #[id number]  was used during the procedure.  Sedation, endotracheal intubation, and mechanical ventilation were initiated  prior to the PREM and were monitored by anesthesia. The Transducer was inserted  without difficulty . Complications None. The patient tolerated the procedure  well. The patient's rhythm is sinus tachycardia. Adequate quality two-  dimensional was performed and interpreted. Adequate quality color and spectral  Doppler were performed and interpreted.     Left Ventricle  Left ventricular size is normal. Global and regional left ventricular function  is normal with an EF of 60-65%.     Right Ventricle  The right ventricle is normal size. Global right ventricular function is  normal.     Atria  Both atria appear normal. The left atrial appendage is normal. It is free of  spontaneous echo contrast and thrombus. No left atrial mass or thrombus  visualized. The atrial septum is intact as assessed by color Doppler .     Mitral Valve  The mitral valve is normal. Trace mitral insufficiency is present.     Aortic Valve  Aortic valve is normal in structure and function. The aortic valve is  tricuspid. No aortic regurgitation is present.     Tricuspid Valve  The tricuspid valve is normal. Trace tricuspid insufficiency is present.     Pulmonic Valve  The pulmonic valve is normal.     Vessels  The aorta root is normal.     Pericardium  No pericardial effusion is present.     Miscellaneous  Transgastric imaging was not performed.     ______________________________________________________________________________  Report approved by: Catherine Meraz 02/15/2022 03:20 PM     ______________________________________________________________________________

## 2022-03-10 NOTE — NURSING NOTE
Medications and allergies reviewed with patient.    Patient has had a headache for the last 2 weeks. Patient noticed that he moved his neck one night in bed and cracked his neck and pain started then. Not sure if the feeling he is experiencing in his back is normal, he does not feel any pain there after surgeries.       Patient states that his throat almost feels like it's bent. When he swallows he feels it is forced about alf down, he feels pressure there but no pain.       Vanessa Butler, BOF

## 2022-03-11 ENCOUNTER — HOME INFUSION (PRE-WILLOW HOME INFUSION) (OUTPATIENT)
Dept: PHARMACY | Facility: CLINIC | Age: 52
End: 2022-03-11
Payer: COMMERCIAL

## 2022-03-11 DIAGNOSIS — Z98.890 HX OF CERVICAL SPINE SURGERY: Primary | ICD-10-CM

## 2022-03-11 DIAGNOSIS — M46.42 DISCITIS OF CERVICAL REGION: ICD-10-CM

## 2022-03-11 DIAGNOSIS — M47.12 CERVICAL SPONDYLOSIS WITH MYELOPATHY: ICD-10-CM

## 2022-03-15 LAB — VORICONAZOLE SERPL-MCNC: <0.1 UG/ML (ref 1–5.5)

## 2022-03-15 NOTE — PROGRESS NOTES
This is a recent snapshot of the patient's [facility] medical record.  For current drug dose and complete information and questions, call [phone] or In Basket pool, [facility] infusion ([id number])  CSN Number:  [id number]

## 2022-03-15 NOTE — PROGRESS NOTES
This is a recent snapshot of the patient's [facility] medical record.  For current drug dose and complete information and questions, call [phone] or In Basket pool, fv home infusion ([id number])  CSN Number:  [id number]

## 2022-03-17 ENCOUNTER — OFFICE VISIT (OUTPATIENT)
Dept: ORTHOPEDICS | Facility: CLINIC | Age: 52
End: 2022-03-17
Payer: COMMERCIAL

## 2022-03-17 ENCOUNTER — HOME INFUSION (PRE-WILLOW HOME INFUSION) (OUTPATIENT)
Dept: PHARMACY | Facility: CLINIC | Age: 52
End: 2022-03-17

## 2022-03-17 ENCOUNTER — ANCILLARY PROCEDURE (OUTPATIENT)
Dept: GENERAL RADIOLOGY | Facility: CLINIC | Age: 52
End: 2022-03-17
Attending: ORTHOPAEDIC SURGERY
Payer: COMMERCIAL

## 2022-03-17 ENCOUNTER — LAB REQUISITION (OUTPATIENT)
Dept: LAB | Facility: CLINIC | Age: 52
End: 2022-03-17
Payer: COMMERCIAL

## 2022-03-17 DIAGNOSIS — Z98.890 HX OF CERVICAL SPINE SURGERY: ICD-10-CM

## 2022-03-17 DIAGNOSIS — Z98.890 HX OF CERVICAL SPINE SURGERY: Primary | ICD-10-CM

## 2022-03-17 DIAGNOSIS — M47.12 CERVICAL SPONDYLOSIS WITH MYELOPATHY: ICD-10-CM

## 2022-03-17 DIAGNOSIS — G06.2 EXTRADURAL AND SUBDURAL ABSCESS, UNSPECIFIED: ICD-10-CM

## 2022-03-17 DIAGNOSIS — M46.42 DISCITIS OF CERVICAL REGION: ICD-10-CM

## 2022-03-17 LAB
ALBUMIN SERPL-MCNC: 3.4 G/DL (ref 3.4–5)
ALP SERPL-CCNC: 134 U/L (ref 40–150)
ALT SERPL W P-5'-P-CCNC: 22 U/L (ref 0–70)
ANION GAP SERPL CALCULATED.3IONS-SCNC: 3 MMOL/L (ref 3–14)
AST SERPL W P-5'-P-CCNC: 13 U/L (ref 0–45)
BASOPHILS # BLD AUTO: 0.2 10E3/UL (ref 0–0.2)
BASOPHILS NFR BLD AUTO: 2 %
BILIRUB SERPL-MCNC: 0.3 MG/DL (ref 0.2–1.3)
BUN SERPL-MCNC: 12 MG/DL (ref 7–30)
CALCIUM SERPL-MCNC: 8.9 MG/DL (ref 8.5–10.1)
CHLORIDE BLD-SCNC: 107 MMOL/L (ref 94–109)
CO2 SERPL-SCNC: 29 MMOL/L (ref 20–32)
CREAT SERPL-MCNC: 0.77 MG/DL (ref 0.66–1.25)
CRP SERPL-MCNC: 3.3 MG/L (ref 0–8)
EOSINOPHIL # BLD AUTO: 0.5 10E3/UL (ref 0–0.7)
EOSINOPHIL NFR BLD AUTO: 7 %
ERYTHROCYTE [DISTWIDTH] IN BLOOD BY AUTOMATED COUNT: 14.2 % (ref 10–15)
GFR SERPL CREATININE-BSD FRML MDRD: >90 ML/MIN/1.73M2
GLUCOSE BLD-MCNC: 90 MG/DL (ref 70–99)
HCT VFR BLD AUTO: 38.8 % (ref 40–53)
HGB BLD-MCNC: 12.6 G/DL (ref 13.3–17.7)
HOLD SPECIMEN: NORMAL
HOLD SPECIMEN: NORMAL
IMM GRANULOCYTES # BLD: 0 10E3/UL
IMM GRANULOCYTES NFR BLD: 0 %
LYMPHOCYTES # BLD AUTO: 2.2 10E3/UL (ref 0.8–5.3)
LYMPHOCYTES NFR BLD AUTO: 32 %
MCH RBC QN AUTO: 27.7 PG (ref 26.5–33)
MCHC RBC AUTO-ENTMCNC: 32.5 G/DL (ref 31.5–36.5)
MCV RBC AUTO: 85 FL (ref 78–100)
MONOCYTES # BLD AUTO: 0.7 10E3/UL (ref 0–1.3)
MONOCYTES NFR BLD AUTO: 9 %
NEUTROPHILS # BLD AUTO: 3.4 10E3/UL (ref 1.6–8.3)
NEUTROPHILS NFR BLD AUTO: 50 %
NRBC # BLD AUTO: 0 10E3/UL
NRBC BLD AUTO-RTO: 0 /100
PLATELET # BLD AUTO: 314 10E3/UL (ref 150–450)
POTASSIUM BLD-SCNC: 4 MMOL/L (ref 3.4–5.3)
PROT SERPL-MCNC: 6.7 G/DL (ref 6.8–8.8)
RBC # BLD AUTO: 4.55 10E6/UL (ref 4.4–5.9)
SODIUM SERPL-SCNC: 139 MMOL/L (ref 133–144)
VANCOMYCIN SERPL-MCNC: 13.7 MG/L
WBC # BLD AUTO: 7 10E3/UL (ref 4–11)

## 2022-03-17 PROCEDURE — 86140 C-REACTIVE PROTEIN: CPT | Performed by: STUDENT IN AN ORGANIZED HEALTH CARE EDUCATION/TRAINING PROGRAM

## 2022-03-17 PROCEDURE — 85025 COMPLETE CBC W/AUTO DIFF WBC: CPT | Performed by: STUDENT IN AN ORGANIZED HEALTH CARE EDUCATION/TRAINING PROGRAM

## 2022-03-17 PROCEDURE — 72082 X-RAY EXAM ENTIRE SPI 2/3 VW: CPT | Performed by: STUDENT IN AN ORGANIZED HEALTH CARE EDUCATION/TRAINING PROGRAM

## 2022-03-17 PROCEDURE — 99024 POSTOP FOLLOW-UP VISIT: CPT | Performed by: ORTHOPAEDIC SURGERY

## 2022-03-17 PROCEDURE — 80202 ASSAY OF VANCOMYCIN: CPT | Performed by: STUDENT IN AN ORGANIZED HEALTH CARE EDUCATION/TRAINING PROGRAM

## 2022-03-17 PROCEDURE — 80053 COMPREHEN METABOLIC PANEL: CPT | Performed by: STUDENT IN AN ORGANIZED HEALTH CARE EDUCATION/TRAINING PROGRAM

## 2022-03-17 RX ORDER — OXYCODONE HYDROCHLORIDE 5 MG/1
5 TABLET ORAL EVERY 6 HOURS PRN
Qty: 14 TABLET | Refills: 0 | Status: SHIPPED | OUTPATIENT
Start: 2022-03-17 | End: 2022-03-24

## 2022-03-17 NOTE — PROGRESS NOTES
Reason for Visit:  Chief Complaint   Patient presents with     Surgical Followup     DOS 2/12/22 S/P Posterior instrumented spinal fusion cervical 5 to thoracic 2, with laminectomies at Cervical 5, Cervical 6, Smithe Tucker Oseotomy Cervical 5-6, Cervical 6-7        S> Heron Calero is a 51-year-old male status post C5-6, C7-T1 T1-T2 ACDF and posterior C5-T2 instrumented fusion.    Heron notes that he is feeling significantly better compared to prior to surgery.  He was experiencing severe neck pain prior to surgery.  He notes that his symptoms are tolerable.  Only utilizing 1 oxycodone a day when walking and more active.  He denies any radicular symptoms.  He notes that he has had troubles with swallowing.  Has a speech therapy follow-up scheduled for April.  He notes that he has trouble with chewing as well and he believes this is related to head fixation device placed in surgery.      Heron was noted to have C7-1 discitis and epidural abscess prior to surgery..  His CRP is observed to be trending down status post IV infusions of vancomycin and rifampin.  He is being transitioned to oral antibiotics.  He has been following infectious disease who have noted there is no obvious concern for infections at this moment.  He has been following restrictions as recommended.  He has been wearing his neck brace as recommended.  He denies any concerns with his incision.        Neck Disability Index (NDI) Questionnaire    Neck Disability Index (NDI) 3/17/2022   Neck Disability Index: Count 9   NDI: Total Score = SUM (points for all 10 findings) 22   Neck Disability in Percent = (Total Score) / 50 * 100 48.89 (%)      Preop NDI NR  6 wk  48.89%      PROMIS-10 Scores  Global Mental Health Score: 16  Global Physical Health Score: 12  PROMIS TOTAL - SUBSCORES: 28       O>   Alert, oriented x 3, cooperative.  Not in CP distress.  There were no vitals taken for this visit.  Surgical incision well-healed, no sign of  infection.  Ambulates independently.   Grossly neurologically intact.    Imaging:   XR cervical.    Stable postoperative imaging without any lucency or fractures to instrumentation.  Kyphosis noted at C7-T1 unchanged from previous imaging.  No migration of interbody cages.    A>  1.  6 wks s/p staged AP fusion C5-T2 (2/9 and 2/12/22) for discitis C7-T1 and spondylosis with stenosis C5-C7 (intraop cultures: MSSA, Cutibacterium; blood cultures: MSSA, Granulicatella), ongoing treatment with IV Vancomycin and PO Rifampin; doing well.    P>   Heron notes that he is doing well status post surgical intervention.  He notes that his pain is well controlled.  He will occasionally experiencing muscle spasms.  Taking Robaxin as needed. We will plan on referring him to physical therapy.  He was educated to continue wearing his neck brace on at all times He was seen by infectious disease on 3/10/2022.  Plan will be to continue IV Vanco until 3/25.  He would then be transitioned to doxycycline and rifampin.  At this time they are not concerned for infection and noted that CRP is trending downwards.  We will plan on sending him a refill of oxycodone.  He was educated that he can increase his lifting restrictions to 20 pounds.  Educated to wear his brace when out of bed for the next 6 weeks.    I conducted a thorough discussion with patient and wife that our first surgery (2/9/22, anterior approach) was technically a wrong-level surgery, as we had originally planned and consented for 3-level ACDF C5-T1, but ended up doing C5-6,C7-T1 and T1-2.  This was recognized and confirmed intraoperatively, and I made a conscious decision as to how to address the skipped C6-7 level and the additional T1-2 level that was performed.  Re C6-7, I decided that it was not worth going after given the robust ankylosis present making identifying the disc very difficult, and that this could be adequately addressing during posterior stage surgery with  laminectomy.  Re T1-2, I thought it was acceptable that it was included in the ACDF, as I was planning to go down to T2 anyway during stage 2 (because of compromised T1 upper endplate due to infection), thus the T1-2 interbody fusion did not result in making the fusion longer than necessary, and could only have added benefit.  Furthermore, given our intraop findings that the level looked infected (and intraop specimens grew MSSA and Cutibacterium), it was perhaps even serendipitous that this level was also performed.  At any rate, these were all considered prior to completing his first stage surgery, at which point we knew what had occurred and had a clear plan moving forward at the time.    Patient and wife expressed good understanding and agreement.  Questions answered to their satisfaction, and to best of my ability.    - Continue infection mgmt c/o Inf Dse (Dr. Nazario Tapia).  - Oxycodone refill.  - Continue [place] J collar wear until 12 wks postop; may advance lifting restriction to 20 lbs.    RTC in 6 wks (3 mos postop) with cervical lat x-ray.    Bishop PHILL Sullivan PA-C    Attestation:  I (Dr. Kulwinder Calixto - Spine Surgeon) have personally evaluated patient with PALOMO Sullivan, and agree with findings and plan outlined in the note, which I also edited.  I discussed at length with the patient/family, explained the nature of spinal condition, and formulated workup and/or treatment plan together.  All questions were answered to the best of my ability and to patient's apparent satisfaction.      Kulwinder Calixto MD    Orthopaedic Spine Surgery  Dept Orthopaedic Surgery, [facility]  [phone] office, [phone] pager  [email or web address]

## 2022-03-17 NOTE — LETTER
3/17/2022         RE: Dave Calero  [address] [facility] [address]        Dear Colleague,    Thank you for referring your patient, Dave Calero, to the [facility] [place]. Please see a copy of my visit note below.    Reason for Visit:  Chief Complaint   Patient presents with     Surgical Followup     DOS 2/12/22 S/P Posterior instrumented spinal fusion cervical 5 to thoracic 2, with laminectomies at Cervical 5, Cervical 6, Smithe Tucker Oseotomy Cervical 5-6, Cervical 6-7        S> Heron Calero is a 51-year-old male status post C5-6, C7-T1 T1-T2 ACDF and posterior C5-T2 instrumented fusion.    Heron notes that he is feeling significantly better compared to prior to surgery.  He was experiencing severe neck pain prior to surgery.  He notes that his symptoms are tolerable.  Only utilizing 1 oxycodone a day when walking and more active.  He denies any radicular symptoms.  He notes that he has had troubles with swallowing.  Has a speech therapy follow-up scheduled for April.  He notes that he has trouble with chewing as well and he believes this is related to head fixation device placed in surgery.      Heron was noted to have C7-1 discitis and epidural abscess prior to surgery..  His CRP is observed to be trending down status post IV infusions of vancomycin and rifampin.  He is being transitioned to oral antibiotics.  He has been following infectious disease who have noted there is no obvious concern for infections at this moment.  He has been following restrictions as recommended.  He has been wearing his neck brace as recommended.  He denies any concerns with his incision.        Neck Disability Index (NDI) Questionnaire    Neck Disability Index (NDI) 3/17/2022   Neck Disability Index: Count 9   NDI: Total Score = SUM (points for all 10 findings) 22   Neck Disability in Percent = (Total Score) / 50 * 100 48.89 (%)      Preop  NDI NR  6 wk  48.89%      PROMIS-10 Scores  Global Mental Health Score: 16  Global Physical Health Score: 12  PROMIS TOTAL - SUBSCORES: 28       O>   Alert, oriented x 3, cooperative.  Not in CP distress.  There were no vitals taken for this visit.  Surgical incision well-healed, no sign of infection.  Ambulates independently.   Grossly neurologically intact.    Imaging:   XR cervical.    Stable postoperative imaging without any lucency or fractures to instrumentation.  Kyphosis noted at C7-T1 unchanged from previous imaging.  No migration of interbody cages.    A>  1.  6 wks s/p staged AP fusion C5-T2 (2/9 and 2/12/22) for discitis C7-T1 and spondylosis with stenosis C5-C7 (intraop cultures: MSSA, Cutibacterium; blood cultures: MSSA, Granulicatella), ongoing treatment with IV Vancomycin and PO Rifampin; doing well.    P>   Heron notes that he is doing well status post surgical intervention.  He notes that his pain is well controlled.  He will occasionally experiencing muscle spasms.  Taking Robaxin as needed. We will plan on referring him to physical therapy.  He was educated to continue wearing his neck brace on at all times He was seen by infectious disease on 3/10/2022.  Plan will be to continue IV Vanco until 3/25.  He would then be transitioned to doxycycline and rifampin.  At this time they are not concerned for infection and noted that CRP is trending downwards.  We will plan on sending him a refill of oxycodone.  He was educated that he can increase his lifting restrictions to 20 pounds.  Educated to wear his brace when out of bed for the next 6 weeks.    I conducted a thorough discussion with patient and wife that our first surgery (2/9/22, anterior approach) was technically a wrong-level surgery, as we had originally planned and consented for 3-level ACDF C5-T1, but ended up doing C5-6,C7-T1 and T1-2.  This was recognized and confirmed intraoperatively, and I made a conscious decision as to how to address  the skipped C6-7 level and the additional T1-2 level that was performed.  Re C6-7, I decided that it was not worth going after given the robust ankylosis present making identifying the disc very difficult, and that this could be adequately addressing during posterior stage surgery with laminectomy.  Re T1-2, I thought it was acceptable that it was included in the ACDF, as I was planning to go down to T2 anyway during stage 2 (because of compromised T1 upper endplate due to infection), thus the T1-2 interbody fusion did not result in making the fusion longer than necessary, and could only have added benefit.  Furthermore, given our intraop findings that the level looked infected (and intraop specimens grew MSSA and Cutibacterium), it was perhaps even serendipitous that this level was also performed.  At any rate, these were all considered prior to completing his first stage surgery, at which point we knew what had occurred and had a clear plan moving forward at the time.    Patient and wife expressed good understanding and agreement.  Questions answered to their satisfaction, and to best of my ability.    - Continue infection mgmt c/o Inf Dse (Dr. Nazario Tapia).  - Oxycodone refill.  - Continue [place] J collar wear until 12 wks postop; may advance lifting restriction to 20 lbs.    RTC in 6 wks (3 mos postop) with cervical lat x-ray.    Bishop PHILL Sullivan PA-C    Attestation:  I (Dr. Kulwinder Calixto - Spine Surgeon) have personally evaluated patient with PALOMO Sullivan, and agree with findings and plan outlined in the note, which I also edited.  I discussed at length with the patient/family, explained the nature of spinal condition, and formulated workup and/or treatment plan together.  All questions were answered to the best of my ability and to patient's apparent satisfaction.      Kulwinder Calixto MD    Orthopaedic Spine Surgery  Dept Orthopaedic Surgery, [facility]  [phone]  office, [phone] pager  [email or web address]

## 2022-03-18 ENCOUNTER — HOME INFUSION (PRE-WILLOW HOME INFUSION) (OUTPATIENT)
Dept: PHARMACY | Facility: CLINIC | Age: 52
End: 2022-03-18

## 2022-03-22 ENCOUNTER — THERAPY VISIT (OUTPATIENT)
Dept: PHYSICAL THERAPY | Facility: CLINIC | Age: 52
End: 2022-03-22
Attending: ORTHOPAEDIC SURGERY
Payer: COMMERCIAL

## 2022-03-22 DIAGNOSIS — Z98.890 HX OF CERVICAL SPINE SURGERY: ICD-10-CM

## 2022-03-22 DIAGNOSIS — M54.2 NECK PAIN: ICD-10-CM

## 2022-03-22 DIAGNOSIS — Z98.1 ARTHRODESIS STATUS: ICD-10-CM

## 2022-03-22 PROCEDURE — 97110 THERAPEUTIC EXERCISES: CPT | Mod: GP | Performed by: PHYSICAL THERAPIST

## 2022-03-22 PROCEDURE — 97161 PT EVAL LOW COMPLEX 20 MIN: CPT | Mod: GP | Performed by: PHYSICAL THERAPIST

## 2022-03-22 NOTE — PROGRESS NOTES
Physical Therapy Initial Evaluation  Subjective:  The history is provided by the patient and medical records. No  was used.   Patient Health History  Dave Calero being seen for neck pain.     Problem began: 2/12/2022.   Problem occurred: s/p spine surgery   Pain is reported as 2/10 on pain scale.  General health as reported by patient is good.  Pertinent medical history includes: none.   Red flags:  None as reported by patient.  Medical allergies: none.   Surgeries include:  Orthopedic surgery. Other surgery history details: 2/12/22.    Current medications:  Anti-depressants.    Current occupation is Property maintenence.   Primary job tasks include:  Driving, lifting/carrying, prolonged standing, pushing/pulling and repetitive tasks.                  Therapist Generated HPI Evaluation  Problem details: Heron Calero is a 51-year-old male status post C5-6, C7-T1 T1-T2 ACDF and posterior C5-T2 instrumented fusion. DOS 2/12/22.  Denies pain, however notes that is having headaches. IV meds until 3/25.   Notes some difficulty with swallowing - does have eval with speech in April.   R arm feels very weak, is R side dominant. .         Type of problem:  Cervical spine.    This is a new condition.  Occurance: s/p surgery.    Patient reports pain:  Mid cervical spine, lower cervical spine and upper thoracic.  Pain is described as aching and is constant.  Pain radiates to:  None.   Since onset symptoms are unchanged.  Associated symptoms:  Headache and loss of strength.  and relieved by ice and bracing/immobilizing.  Imaging testing: see chart for detailed imaging.    Restrictions due to condition include:  Currently not working due to present treatment.                          Objective:  Standing Alignment:    Cervical/Thoracic:  Forward head (wearing neck brace)  Shoulder/UE:  Rounded shoulders and protracted scapula R (R posterior neck, to lateral shoulder mild  edema)                                  Cervical/Thoracic Evaluation    AROM:  AROM Cervical:    Flexion:          Mod-max restriction  Extension:       Mod-max restriction  Rotation:         Left: mod restriction     Right: mod restriction  Side Bend:      Left:     Right:   AROM Thoracic:    Flexion:             Tightness  Extension:          To neutral, w/ tightness  Rotation:            Left:     Right:      Headaches cervical eval: 3x/week, all day HA.        Cervical Dermatomes:  normal                                   Shoulder Evaluation:  ROM:  AROM:  normal                                  Strength:    Flexion: Left:5/5   Pain:    Right: 4/5     Pain:     Abduction:  Left: 5/5  Pain:    Right: 4/5     Pain:    Internal Rotation:  Left:5-/5     Pain:    Right: 4+/5     Pain:  External Rotation:   Left:5-/5     Pain:   Right:4/5     Pain:        Elbow Flexion:  Left:5/5     Pain:    Right:4/5     Pain:  Elbow Extension:  Left:5/5     Pain:    Right:4/5     Pain:                                               General Evaluation:      Gross Strength:       Strength:  Decreased R > L                      Integumentary/Inspection:    Significant findings:  Incisions appear to be well healed, midline posterior cervical spine. Anterior horizontal                                                 ROS    Assessment/Plan:    Patient is a 51 year old male with cervical complaints.    Patient has the following significant findings with corresponding treatment plan.                Diagnosis 1:  Neck Pain  Pain -  hot/cold therapy, electric stimulation, manual therapy, self management, education and home program  Decreased ROM/flexibility - manual therapy and therapeutic exercise  Decreased joint mobility - manual therapy and therapeutic exercise  Decreased strength - therapeutic exercise and therapeutic activities  Decreased proprioception - neuro re-education and therapeutic activities  Impaired muscle performance -  neuro re-education  Decreased function - therapeutic activities  Impaired posture - neuro re-education    Therapy Evaluation Codes:   1) History comprised of:   Personal factors that impact the plan of care:      Past/current experiences and Time since onset of symptoms.    Comorbidity factors that impact the plan of care are:      None.     Medications impacting care: None.  2) Examination of Body Systems comprised of:   Body structures and functions that impact the plan of care:      Cervical spine.   Activity limitations that impact the plan of care are:      Bathing, Bending, Cooking, Driving, Dressing, Grasping, Lifting, Reading/Computer work, Walking, Working and Sleeping.  3) Clinical presentation characteristics are:   Stable/Uncomplicated.  4) Decision-Making    Low complexity using standardized patient assessment instrument and/or measureable assessment of functional outcome.  Cumulative Therapy Evaluation is: Low complexity.    Previous and current functional limitations:  (See Goal Flow Sheet for this information)    Short term and Long term goals: (See Goal Flow Sheet for this information)     Communication ability:  Patient appears to be able to clearly communicate and understand verbal and written communication and follow directions correctly.  Treatment Explanation - The following has been discussed with the patient:   RX ordered/plan of care  Anticipated outcomes  Possible risks and side effects  This patient would benefit from PT intervention to resume normal activities.   Rehab potential is excellent.    Frequency:  2 X week, once daily  Duration:  for 4 weeks tapering to 1 X a week over 4 weeks  Discharge Plan:  Achieve all LTG.  Independent in home treatment program.    Please refer to the daily flowsheet for treatment today, total treatment time and time spent performing 1:1 timed codes.

## 2022-03-24 ENCOUNTER — HOME INFUSION (PRE-WILLOW HOME INFUSION) (OUTPATIENT)
Dept: PHARMACY | Facility: CLINIC | Age: 52
End: 2022-03-24

## 2022-03-24 ENCOUNTER — THERAPY VISIT (OUTPATIENT)
Dept: PHYSICAL THERAPY | Facility: CLINIC | Age: 52
End: 2022-03-24
Payer: COMMERCIAL

## 2022-03-24 ENCOUNTER — LAB REQUISITION (OUTPATIENT)
Dept: LAB | Facility: CLINIC | Age: 52
End: 2022-03-24
Payer: COMMERCIAL

## 2022-03-24 DIAGNOSIS — M54.2 NECK PAIN: ICD-10-CM

## 2022-03-24 DIAGNOSIS — Z98.1 ARTHRODESIS STATUS: ICD-10-CM

## 2022-03-24 DIAGNOSIS — G06.2 EXTRADURAL AND SUBDURAL ABSCESS, UNSPECIFIED: ICD-10-CM

## 2022-03-24 LAB
ALBUMIN SERPL-MCNC: 3.2 G/DL (ref 3.4–5)
ALP SERPL-CCNC: 120 U/L (ref 40–150)
ALT SERPL W P-5'-P-CCNC: 26 U/L (ref 0–70)
ANION GAP SERPL CALCULATED.3IONS-SCNC: 5 MMOL/L (ref 3–14)
AST SERPL W P-5'-P-CCNC: 15 U/L (ref 0–45)
BASOPHILS # BLD AUTO: 0.1 10E3/UL (ref 0–0.2)
BASOPHILS NFR BLD AUTO: 2 %
BILIRUB SERPL-MCNC: 0.2 MG/DL (ref 0.2–1.3)
BUN SERPL-MCNC: 13 MG/DL (ref 7–30)
CALCIUM SERPL-MCNC: 8.9 MG/DL (ref 8.5–10.1)
CHLORIDE BLD-SCNC: 107 MMOL/L (ref 94–109)
CO2 SERPL-SCNC: 28 MMOL/L (ref 20–32)
CREAT SERPL-MCNC: 0.86 MG/DL (ref 0.66–1.25)
CRP SERPL-MCNC: 12 MG/L (ref 0–8)
EOSINOPHIL # BLD AUTO: 0.5 10E3/UL (ref 0–0.7)
EOSINOPHIL NFR BLD AUTO: 7 %
ERYTHROCYTE [DISTWIDTH] IN BLOOD BY AUTOMATED COUNT: 13.5 % (ref 10–15)
GFR SERPL CREATININE-BSD FRML MDRD: >90 ML/MIN/1.73M2
GLUCOSE BLD-MCNC: 94 MG/DL (ref 70–99)
HCT VFR BLD AUTO: 35.8 % (ref 40–53)
HGB BLD-MCNC: 11.9 G/DL (ref 13.3–17.7)
IMM GRANULOCYTES # BLD: 0 10E3/UL
IMM GRANULOCYTES NFR BLD: 0 %
LYMPHOCYTES # BLD AUTO: 1.9 10E3/UL (ref 0.8–5.3)
LYMPHOCYTES NFR BLD AUTO: 26 %
MCH RBC QN AUTO: 27.7 PG (ref 26.5–33)
MCHC RBC AUTO-ENTMCNC: 33.2 G/DL (ref 31.5–36.5)
MCV RBC AUTO: 83 FL (ref 78–100)
MONOCYTES # BLD AUTO: 0.8 10E3/UL (ref 0–1.3)
MONOCYTES NFR BLD AUTO: 10 %
NEUTROPHILS # BLD AUTO: 4 10E3/UL (ref 1.6–8.3)
NEUTROPHILS NFR BLD AUTO: 55 %
NRBC # BLD AUTO: 0 10E3/UL
NRBC BLD AUTO-RTO: 0 /100
PLATELET # BLD AUTO: 312 10E3/UL (ref 150–450)
POTASSIUM BLD-SCNC: 3.7 MMOL/L (ref 3.4–5.3)
PROT SERPL-MCNC: 6.9 G/DL (ref 6.8–8.8)
RBC # BLD AUTO: 4.29 10E6/UL (ref 4.4–5.9)
SODIUM SERPL-SCNC: 140 MMOL/L (ref 133–144)
VANCOMYCIN SERPL-MCNC: 14.3 MG/L
WBC # BLD AUTO: 7.4 10E3/UL (ref 4–11)

## 2022-03-24 PROCEDURE — 80053 COMPREHEN METABOLIC PANEL: CPT | Performed by: STUDENT IN AN ORGANIZED HEALTH CARE EDUCATION/TRAINING PROGRAM

## 2022-03-24 PROCEDURE — 97112 NEUROMUSCULAR REEDUCATION: CPT | Mod: GP | Performed by: PHYSICAL THERAPIST

## 2022-03-24 PROCEDURE — 97110 THERAPEUTIC EXERCISES: CPT | Mod: GP | Performed by: PHYSICAL THERAPIST

## 2022-03-24 PROCEDURE — 86140 C-REACTIVE PROTEIN: CPT | Performed by: STUDENT IN AN ORGANIZED HEALTH CARE EDUCATION/TRAINING PROGRAM

## 2022-03-24 PROCEDURE — 85025 COMPLETE CBC W/AUTO DIFF WBC: CPT | Performed by: STUDENT IN AN ORGANIZED HEALTH CARE EDUCATION/TRAINING PROGRAM

## 2022-03-24 PROCEDURE — 80202 ASSAY OF VANCOMYCIN: CPT | Performed by: STUDENT IN AN ORGANIZED HEALTH CARE EDUCATION/TRAINING PROGRAM

## 2022-03-25 ENCOUNTER — HOME INFUSION (PRE-WILLOW HOME INFUSION) (OUTPATIENT)
Dept: PHARMACY | Facility: CLINIC | Age: 52
End: 2022-03-25

## 2022-03-27 ENCOUNTER — HEALTH MAINTENANCE LETTER (OUTPATIENT)
Age: 52
End: 2022-03-27

## 2022-03-28 ENCOUNTER — LAB REQUISITION (OUTPATIENT)
Dept: LAB | Facility: CLINIC | Age: 52
End: 2022-03-28
Payer: COMMERCIAL

## 2022-03-28 ENCOUNTER — THERAPY VISIT (OUTPATIENT)
Dept: PHYSICAL THERAPY | Facility: CLINIC | Age: 52
End: 2022-03-28
Payer: COMMERCIAL

## 2022-03-28 ENCOUNTER — HOME INFUSION (PRE-WILLOW HOME INFUSION) (OUTPATIENT)
Dept: PHARMACY | Facility: CLINIC | Age: 52
End: 2022-03-28

## 2022-03-28 DIAGNOSIS — M54.2 NECK PAIN: ICD-10-CM

## 2022-03-28 DIAGNOSIS — Z98.1 ARTHRODESIS STATUS: ICD-10-CM

## 2022-03-28 DIAGNOSIS — G06.2 EXTRADURAL AND SUBDURAL ABSCESS, UNSPECIFIED: ICD-10-CM

## 2022-03-28 LAB — CRP SERPL-MCNC: 4.6 MG/L (ref 0–8)

## 2022-03-28 PROCEDURE — 97110 THERAPEUTIC EXERCISES: CPT | Mod: GP | Performed by: PHYSICAL THERAPIST

## 2022-03-28 PROCEDURE — 86140 C-REACTIVE PROTEIN: CPT | Performed by: STUDENT IN AN ORGANIZED HEALTH CARE EDUCATION/TRAINING PROGRAM

## 2022-03-28 PROCEDURE — 97112 NEUROMUSCULAR REEDUCATION: CPT | Mod: GP | Performed by: PHYSICAL THERAPIST

## 2022-03-29 ENCOUNTER — HOME INFUSION (PRE-WILLOW HOME INFUSION) (OUTPATIENT)
Dept: PHARMACY | Facility: CLINIC | Age: 52
End: 2022-03-29

## 2022-03-30 ENCOUNTER — HOME INFUSION (PRE-WILLOW HOME INFUSION) (OUTPATIENT)
Dept: PHARMACY | Facility: CLINIC | Age: 52
End: 2022-03-30

## 2022-03-31 ENCOUNTER — HOME INFUSION (PRE-WILLOW HOME INFUSION) (OUTPATIENT)
Dept: PHARMACY | Facility: CLINIC | Age: 52
End: 2022-03-31

## 2022-04-01 ENCOUNTER — THERAPY VISIT (OUTPATIENT)
Dept: PHYSICAL THERAPY | Facility: CLINIC | Age: 52
End: 2022-04-01
Payer: COMMERCIAL

## 2022-04-01 DIAGNOSIS — M54.2 NECK PAIN: ICD-10-CM

## 2022-04-01 DIAGNOSIS — Z98.1 ARTHRODESIS STATUS: ICD-10-CM

## 2022-04-01 PROCEDURE — 97110 THERAPEUTIC EXERCISES: CPT | Mod: GP | Performed by: PHYSICAL THERAPIST

## 2022-04-01 PROCEDURE — 97112 NEUROMUSCULAR REEDUCATION: CPT | Mod: GP | Performed by: PHYSICAL THERAPIST

## 2022-04-06 ENCOUNTER — THERAPY VISIT (OUTPATIENT)
Dept: PHYSICAL THERAPY | Facility: CLINIC | Age: 52
End: 2022-04-06
Payer: COMMERCIAL

## 2022-04-06 DIAGNOSIS — Z98.1 ARTHRODESIS STATUS: ICD-10-CM

## 2022-04-06 DIAGNOSIS — M54.2 NECK PAIN: ICD-10-CM

## 2022-04-06 PROCEDURE — 97110 THERAPEUTIC EXERCISES: CPT | Mod: GP | Performed by: PHYSICAL THERAPIST

## 2022-04-06 PROCEDURE — 97140 MANUAL THERAPY 1/> REGIONS: CPT | Mod: GP | Performed by: PHYSICAL THERAPIST

## 2022-04-06 PROCEDURE — 97112 NEUROMUSCULAR REEDUCATION: CPT | Mod: GP | Performed by: PHYSICAL THERAPIST

## 2022-04-11 NOTE — PROGRESS NOTES
This is a recent snapshot of the patient's [facility] medical record.  For current drug dose and complete information and questions, call [phone] or In Basket pool, fv home infusion ([id number])  CSN Number:  [id number]

## 2022-04-13 ENCOUNTER — THERAPY VISIT (OUTPATIENT)
Dept: PHYSICAL THERAPY | Facility: CLINIC | Age: 52
End: 2022-04-13
Payer: COMMERCIAL

## 2022-04-13 DIAGNOSIS — Z98.1 ARTHRODESIS STATUS: ICD-10-CM

## 2022-04-13 DIAGNOSIS — M54.2 NECK PAIN: Primary | ICD-10-CM

## 2022-04-13 PROCEDURE — 97110 THERAPEUTIC EXERCISES: CPT | Mod: GP | Performed by: PHYSICAL THERAPIST

## 2022-04-13 PROCEDURE — 97112 NEUROMUSCULAR REEDUCATION: CPT | Mod: GP | Performed by: PHYSICAL THERAPIST

## 2022-04-13 PROCEDURE — 97140 MANUAL THERAPY 1/> REGIONS: CPT | Mod: GP | Performed by: PHYSICAL THERAPIST

## 2022-04-20 ENCOUNTER — THERAPY VISIT (OUTPATIENT)
Dept: PHYSICAL THERAPY | Facility: CLINIC | Age: 52
End: 2022-04-20
Payer: COMMERCIAL

## 2022-04-20 DIAGNOSIS — Z98.890 HX OF CERVICAL SPINE SURGERY: ICD-10-CM

## 2022-04-20 DIAGNOSIS — M54.2 NECK PAIN: Primary | ICD-10-CM

## 2022-04-20 DIAGNOSIS — Z98.1 ARTHRODESIS STATUS: ICD-10-CM

## 2022-04-20 DIAGNOSIS — M47.12 CERVICAL SPONDYLOSIS WITH MYELOPATHY: Primary | ICD-10-CM

## 2022-04-20 PROCEDURE — 97140 MANUAL THERAPY 1/> REGIONS: CPT | Mod: GP | Performed by: PHYSICAL THERAPIST

## 2022-04-20 PROCEDURE — 97110 THERAPEUTIC EXERCISES: CPT | Mod: GP | Performed by: PHYSICAL THERAPIST

## 2022-04-25 ENCOUNTER — HOSPITAL ENCOUNTER (OUTPATIENT)
Dept: SPEECH THERAPY | Facility: CLINIC | Age: 52
Discharge: HOME | End: 2022-04-25
Attending: ORTHOPAEDIC SURGERY | Admitting: ORTHOPAEDIC SURGERY
Payer: COMMERCIAL

## 2022-04-25 DIAGNOSIS — Z98.890 HX OF CERVICAL SPINE SURGERY: ICD-10-CM

## 2022-04-25 DIAGNOSIS — M47.12 CERVICAL SPONDYLOSIS WITH MYELOPATHY: ICD-10-CM

## 2022-04-25 DIAGNOSIS — M46.42 DISCITIS OF CERVICAL REGION: ICD-10-CM

## 2022-04-25 PROCEDURE — 92610 EVALUATE SWALLOWING FUNCTION: CPT | Mod: GN | Performed by: SPEECH-LANGUAGE PATHOLOGIST

## 2022-04-25 NOTE — PROGRESS NOTES
Clinical Swallow Evaluation  04/25/22 0800   General Information   Type Of Visit Initial   Start Of Care Date 04/25/22   Referring Physician Kulwinder Calixto MD   Orders Evaluate And Treat   Orders Comment swallowing difficulties   Medical Diagnosis spine surgery   Onset Of Illness/injury Or Date Of Surgery 02/09/22   Hearing WNL   Pertinent History of Current Problem/OT: Additional Occupational Profile Info Heron Calero is a 51-year-old male status post C5-6, C7-T1 T1-T2 ACDF and posterior C5-T2 instrumented fusion. Patient here today because he notes some difficulty with swallowing. Stating he feels the food and liquids he drinks has to divert when in the esophagus and food often gets stuck. Patient noting that he ate pizza and coughed up a sausage two hours later that he could feel  stuck in his esophagus . Patient endorses no coughing or choking during meals but rather a feeling of food getting stuck.   Respiratory Status Room air   Living [facility]   General Observations Patient in good spirits   Patient/family Goals Figure out why food seems to be feeling stuck after he swallows.   Pain Assessment   Pain Reported No   Abuse Screen (yes response referral indicated)   Feels Unsafe at Home or Work/School no   Feels Threatened by Someone no   Does Anyone Try to Keep You From Having Contact with Others or Doing Things Outside Your Home? no   Physical Signs of Abuse Present no   Clinical Swallow Evaluation   Oral Musculature generally intact   Structural Abnormalities none present   Mucosal Quality good   Mandibular Strength and Mobility intact   Oral Labial Strength and Mobility WFL   Lingual Strength and Mobility WFL   Velar Elevation intact   Buccal Strength and Mobility intact   Laryngeal Function Cough;Throat clear;Swallow;Voicing initiated   Oral Musculature Comments Patient has no noted structural anatomical anamolies or concerns noted during clinical swallow eval.  Patient appears to have a good strength/mobility to be able to masticate and swallow foods.   Swallow Eval   Feeding Assistance no assistance needed   Clinical Swallow Eval: Thin Liquid Texture Trial   Mode of Presentation, Thin Liquids cup   Volume of Liquid or Food Presented 1 cup   Oral Phase of Swallow WFL   Pharyngeal Phase of Swallow intact   Successful Strategies Trialed During Procedure chin tuck;head turn to the right;head turn to the left   Diagnostic Statement Patient able to tolerate thin water by good with good a/p transit, laryngeal elevation, and strong swallow. Patient with no s/s of aspiration during or after swallow. Patient feeling like liquid has to divert on its way down esophagus.   Clinical Swallow Eval: Regular (Solid)   Mode of Presentation self-fed   Volume Presented cracker   Oral Phase WFL   Pharyngeal Phase feeling of something stuck in throat   Diagnostic Statement Patient with good mastication and preparation of bolus. Patient swallowed cracker safely and reported that he has to swallow several times to get food down. Patient complaining of food feeling stuck. No s/s of aspiration. Video swallow recommended   Swallow Compensations   Swallow Compensations Reduce amounts;Multiple swallow   Educational Assessment   Barriers to Learning No barriers   Esophageal Phase of Swallow   Patient reports or presents with symptoms of esophageal dysphagia Yes   Esophageal comments Patient feels like food is getting stuck. Patient also feels that acid reflux comes up and gets stuck in same place food does.   General Therapy Interventions   Planned Therapy Interventions Dysphagia Treatment   Swallow Eval: Clinical Impressions   Skilled Criteria for Therapy Intervention Skilled criteria met.  Treatment indicated.   Diet texture recommendations Regular diet;Thin liquids (level 0)   Recommended Feeding/Eating Techniques small sips/bites   Rehab Potential good, to achieve stated therapy goals  "  Demonstrates Need for Referral to Another Service other (see comments)  (patient would benfit from video swallow)   Risks and Benefits of Treatment have been explained. Yes   Patient, family and/or staff in agreement with Plan of Care Yes   Clinical Impression Comments Patient is a 51 year old male presenting to SLP with swallowing problems. Stating foods and liquids feel that they have to divert in upper esophagus and that since his neck fusion it feels \"that something is in the way\" Patient seen today for clinical swallow eval. Patient had no s/s of aspiration of regular textures or thin liquids. Patient would benefit from video swallow to help further determine cause of globus sensation and food getting stuck. No s/s of aspiration noted this date.   Total Session Time   SLP Eval: oral/pharyngeal swallow function, clinical minutes (37206) 30   Total Evaluation Time 30     "

## 2022-04-28 ENCOUNTER — OFFICE VISIT (OUTPATIENT)
Dept: ORTHOPEDICS | Facility: CLINIC | Age: 52
End: 2022-04-28
Payer: COMMERCIAL

## 2022-04-28 DIAGNOSIS — Z98.890 HX OF CERVICAL SPINE SURGERY: Primary | ICD-10-CM

## 2022-04-28 DIAGNOSIS — M47.12 CERVICAL SPONDYLOSIS WITH MYELOPATHY: ICD-10-CM

## 2022-04-28 DIAGNOSIS — R13.10 DYSPHAGIA, UNSPECIFIED TYPE: ICD-10-CM

## 2022-04-28 DIAGNOSIS — M46.42 DISCITIS OF CERVICAL REGION: ICD-10-CM

## 2022-04-28 PROCEDURE — 99024 POSTOP FOLLOW-UP VISIT: CPT | Performed by: ORTHOPAEDIC SURGERY

## 2022-04-28 NOTE — LETTER
Return to Work  2022     Seen today: yes    Patient:  Dave Calero  :   1970  MRN:     [id number]  Physician: JERZY CALIXTOpat Goinserson may return to work on Date: May 2, 2022 (Monday).      The next clinic appointment is scheduled for (date/time) 3 mos.    Patient limitations:    Patient now 2.5 months from cervical spine surgery.    May return to work 50% part time (4 hour days).  After 2 weeks, may increase time up to full-time, as able.            Electronically signed by Jerzy Calixto MD

## 2022-04-28 NOTE — LETTER
4/28/2022         RE: Dave Calero  [address] [facility] [address]        Dear Colleague,    Thank you for referring your patient, Dave Calero, to the [facility] [place]. Please see a copy of my visit note below.    Spine Surgical Hx:  02/09/2022 - St 1 (anterior): ACDF C5-6,C7-T1,T1-2 for discitis C7-T1; Right anterior ICBG harvest (Sembrano), advanced cervical spondylosis with stenosis and myelopathy.  [Implants: Medtronic cervical PEEK cages [id number]].  Cultures: MSSA, Cutibacterium acnes.  02/12/2022 - St 2 (posterior): PISF C5-T2; laminectomies C5 and C6 (C5-6 and C6-7); use of allograft (Sembrano).  [Implants: Medtronic Infinity screw system, 3.5 mm titanium rods x 2].    In-Person Visit    Chief Complaint   Patient presents with     Surgical Followup     DOS 2/12/22 S/P Posterior instrumented spinal fusion cervical 5 to thoracic 2, with laminectomies at Cervical 5, Cervical 6, Smithe Tucker Oseotomy Cervical 5-6, Cervical 6-7        S>  51 year old male, RHD, 2.5 mos postop.     Accompanied by wife.  Neck pain much improved, even resolved compared to preop.  Two main issues at this point:  1.  Swallowing - this is new since surgery; not present preop.  For a while, he thought it may have been improving, but plateaued, and is still a problem.  Both liquids and solids.  Feels like it catches in his throat.  Also painful swallowing.  2.  Neck stiffness.      Switched to PO antibiotics.  - Rifampin 300 mg PO bid.  - Doxycycline 100 mg PO bid.    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      Neck Disability Index (NDI) Questionnaire    Neck Disability Index (NDI) 4/28/2022   Neck Disability Index: Count 9   NDI: Total Score = SUM (points for all 10 findings) 11   Neck Disability in Percent = (Total Score) / 50 * 100 24.44 (%)      Preop NDI          NR  6 wk                   48.89%  3 mo  24.44%    Visual Analog Pain  Scale  Neck Pain Scale 0-10: 1  Right arm pain: 0  Left arm pain: 0    PROMIS-10 Scores  Global Mental Health Score: (P) 12  Global Physical Health Score: (P) 15  PROMIS TOTAL - SUBSCORES: (P) 27    O>   Alert, oriented x 3, cooperative.  Not in CP distress.  There were no vitals taken for this visit.  Surgical incisions (left anterior neck; posterior neck; right anterior iliac crest) well-healed, no sign of infection.  Ambulates independently; no assistive device.  Grossly neurologically intact all extremities.    Imaging:    Cervical lateral x-ray taken today show stable posterior instrumentation C5-T2, and interbody cages C5-6, C7-T1 and T1-2.  No sign of loosening/failure or migration.  Overall, reassuring x-ray.    A>   51/m, RHD with:  1.  2.5 mos s/p AP fusion C5-T1 for discitis (MSSA, Cutibacterium), doing well; ongoing PO antibiotic treatment (Rifampin, Doxycycline) c/o Inf Dse (Dr. Tapia).      P>    Congratulated and reassured patient.  As far as his presenting problem (infection), I think this is being treated adequately.  I would defer to Dr. Tapia (Inf Dse) re how long he would need to be on antibiotics, but I do not think he would need chronic suppression, given his age and healthy immune status.    I am sorry to hear about his persistent swallowing difficulties.  We have placed an order for video swallow study, as was recommended by Speech therapy.  I would be interest to find out what this study shows and to hear further recommendations from SLP.    - Work letter given to patient; may return to work 50% part time; after 2 wks may increase to full time as able.  - Video swallow study.    RTC 3 mos (6 mos postop) with cervical flex-ext x-rays.      Kulwinder Calixto MD    Orthopaedic Spine Surgery  Dept Orthopaedic Surgery, [facility]  [phone] office, [phone] pager  [email or web address]

## 2022-04-28 NOTE — PROGRESS NOTES
Spine Surgical Hx:  02/09/2022 - St 1 (anterior): ACDF C5-6,C7-T1,T1-2 for discitis C7-T1; Right anterior ICBG harvest (Sembrano), advanced cervical spondylosis with stenosis and myelopathy.  [Implants: Medtronic cervical PEEK cages [id number]].  Cultures: MSSA, Cutibacterium acnes.  02/12/2022 - St 2 (posterior): PISF C5-T2; laminectomies C5 and C6 (C5-6 and C6-7); use of allograft (Sembrano).  [Implants: Medtronic Infinity screw system, 3.5 mm titanium rods x 2].    In-Person Visit    Chief Complaint   Patient presents with     Surgical Followup     DOS 2/12/22 S/P Posterior instrumented spinal fusion cervical 5 to thoracic 2, with laminectomies at Cervical 5, Cervical 6, Smithe Tucker Oseotomy Cervical 5-6, Cervical 6-7        S>  51 year old male, RHD, 2.5 mos postop.     Accompanied by wife.  Neck pain much improved, even resolved compared to preop.  Two main issues at this point:  1.  Swallowing - this is new since surgery; not present preop.  For a while, he thought it may have been improving, but plateaued, and is still a problem.  Both liquids and solids.  Feels like it catches in his throat.  Also painful swallowing.  2.  Neck stiffness.      Switched to PO antibiotics.  - Rifampin 300 mg PO bid.  - Doxycycline 100 mg PO bid.    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      Neck Disability Index (NDI) Questionnaire    Neck Disability Index (NDI) 4/28/2022   Neck Disability Index: Count 9   NDI: Total Score = SUM (points for all 10 findings) 11   Neck Disability in Percent = (Total Score) / 50 * 100 24.44 (%)      Preop NDI          NR  6 wk                   48.89%  3 mo  24.44%    Visual Analog Pain Scale  Neck Pain Scale 0-10: 1  Right arm pain: 0  Left arm pain: 0    PROMIS-10 Scores  Global Mental Health Score: (P) 12  Global Physical Health Score: (P) 15  PROMIS TOTAL - SUBSCORES: (P) 27    O>   Alert, oriented x 3, cooperative.  Not in CP distress.  There were no vitals taken for  this visit.  Surgical incisions (left anterior neck; posterior neck; right anterior iliac crest) well-healed, no sign of infection.  Ambulates independently; no assistive device.  Grossly neurologically intact all extremities.    Imaging:    Cervical lateral x-ray taken today show stable posterior instrumentation C5-T2, and interbody cages C5-6, C7-T1 and T1-2.  No sign of loosening/failure or migration.  Overall, reassuring x-ray.    A>   51/m, RHD with:  1.  2.5 mos s/p AP fusion C5-T1 for discitis (MSSA, Cutibacterium), doing well; ongoing PO antibiotic treatment (Rifampin, Doxycycline) c/o Inf Dse (Dr. Tapia).      P>    Congratulated and reassured patient.  As far as his presenting problem (infection), I think this is being treated adequately.  I would defer to Dr. Tapia (Inf Dse) re how long he would need to be on antibiotics, but I do not think he would need chronic suppression, given his age and healthy immune status.    I am sorry to hear about his persistent swallowing difficulties.  We have placed an order for video swallow study, as was recommended by Speech therapy.  I would be interest to find out what this study shows and to hear further recommendations from SLP.    - Work letter given to patient; may return to work 50% part time; after 2 wks may increase to full time as able.  - Video swallow study.    RTC 3 mos (6 mos postop) with cervical flex-ext x-rays.      Kulwinder Calixto MD    Orthopaedic Spine Surgery  Dept Orthopaedic Surgery, [facility]  [phone] office, [phone] pager  [email or web address]

## 2022-05-04 ENCOUNTER — THERAPY VISIT (OUTPATIENT)
Dept: PHYSICAL THERAPY | Facility: CLINIC | Age: 52
End: 2022-05-04
Payer: COMMERCIAL

## 2022-05-04 DIAGNOSIS — M54.2 NECK PAIN: Primary | ICD-10-CM

## 2022-05-04 DIAGNOSIS — Z98.1 ARTHRODESIS STATUS: ICD-10-CM

## 2022-05-04 PROCEDURE — 97110 THERAPEUTIC EXERCISES: CPT | Mod: GP | Performed by: PHYSICAL THERAPIST

## 2022-05-04 PROCEDURE — 97140 MANUAL THERAPY 1/> REGIONS: CPT | Mod: GP | Performed by: PHYSICAL THERAPIST

## 2022-05-18 ENCOUNTER — HOSPITAL ENCOUNTER (OUTPATIENT)
Dept: SPEECH THERAPY | Facility: HOSPITAL | Age: 52
Setting detail: THERAPIES SERIES
Discharge: HOME | End: 2022-05-18
Attending: INTERNAL MEDICINE
Payer: COMMERCIAL

## 2022-05-18 ENCOUNTER — MYC MEDICAL ADVICE (OUTPATIENT)
Dept: ORTHOPEDICS | Facility: CLINIC | Age: 52
End: 2022-05-18
Payer: COMMERCIAL

## 2022-05-18 ENCOUNTER — HOSPITAL ENCOUNTER (OUTPATIENT)
Dept: RADIOLOGY | Facility: HOSPITAL | Age: 52
Discharge: HOME | End: 2022-05-18
Attending: ORTHOPAEDIC SURGERY | Admitting: ORTHOPAEDIC SURGERY
Payer: COMMERCIAL

## 2022-05-18 DIAGNOSIS — R13.10 DYSPHAGIA, UNSPECIFIED TYPE: ICD-10-CM

## 2022-05-18 DIAGNOSIS — M47.12 CERVICAL SPONDYLOSIS WITH MYELOPATHY: ICD-10-CM

## 2022-05-18 DIAGNOSIS — Z98.890 HX OF CERVICAL SPINE SURGERY: ICD-10-CM

## 2022-05-18 DIAGNOSIS — R13.10 DYSPHAGIA, UNSPECIFIED TYPE: Primary | ICD-10-CM

## 2022-05-18 DIAGNOSIS — M46.42 DISCITIS OF CERVICAL REGION: ICD-10-CM

## 2022-05-18 PROCEDURE — 92611 MOTION FLUOROSCOPY/SWALLOW: CPT | Mod: GN

## 2022-05-18 PROCEDURE — 74230 X-RAY XM SWLNG FUNCJ C+: CPT

## 2022-05-18 NOTE — PROGRESS NOTES
Speech-Language Pathology: Outpatient Video Swallow Study     05/18/22 1300   General Information   Type Of Visit Initial   Start Of Care Date 05/18/22   Referring Physician Kulwinder Calixto MD   Orders Evaluate And Treat   Orders Comment swallowing difficulties   Medical Diagnosis Posterior instrumented spinal fusion cervical 5 to thoracic 2, with laminectomies at Cervical 5, Cervical 6, Smithe Tucker Oseotomy Cervical 5-6, Cervical 6-7   Onset Of Illness/injury Or Date Of Surgery 02/09/22   Pertinent History of Current Problem/OT: Additional Occupational Profile Info Pt continues to have globus sensation in throat of food feeling like it is stuck and occasional pain with swallow. He also gives an example of eating pizza and then about 2 hours later feeling like he has to burp and he burps up a piece of sausage that was on his pizza. He does state that he feels like he has occasional reflux, but has never been treated for it.   Respiratory Status Room air   Patient/family Goals Figure out why food seems to be feeling stuck after he swallows.   VFSS Eval: Thin Liquid Texture Trial   Mode of Presentation, Thin Liquid self-fed;cup   Preparatory Phase WFL   Oral Phase, Thin Liquid WFL   Pharyngeal Phase, Thin Liquid WFL   Rosenbek's Penetration Aspiration Scale: Thin Liquid Trial Results 1 - no aspiration, contrast does not enter airway   VFSS Eval: Moderately Thick Liquids    Mode of Presentation spoon   Preparatory Phase WFL   Oral Phase WFL   Pharyngeal Phase WFL   Rosenbek's Penetration Aspiration Scale 1 - no aspiration, contrast does not enter airway   VFSS Eval: Regular Texture Trial (Solid)   Mode of Presentation self-fed   Order of Presentation barium coated cracker   Preparatory Phase WFL   Oral Phase WFL   Pharyngeal Phase WFL   Rosenbek's Penetration Aspiration Scale 1 - no aspiration, contrast does not enter airway   Esophageal Phase of Swallow   Patient reports or presents with symptoms of  esophageal dysphagia Yes   Esophageal comments Patient feels like food is getting stuck. Patient also feels that acid reflux comes up and gets stuck in same place food does.   General Therapy Interventions   Intervention Comments recommend esophagram to asssess motility of esophagus, based on pt sx's of globus sensation, reflux and burping up peice of sausage from pizza that was consumed 2 hours prior.   Swallow Eval: Clinical Impressions   Skilled Criteria for Therapy Intervention No problems identified which require skilled intervention   Treatment Diagnosis possible esophageal dysphagia   Diet texture recommendations Regular diet;Thin liquids (level 0)   Clinical Impression Comments Video Swallow Study completed. Patient had no aspiration or penetration. Oropharyngeal swallow function is WNL. Tongue base retraction is WNL. Pharyngeal constriction, hyolaryngeal elevation and excursion were all WNL. Epiglottic inversion is complete. Swallow response is timely. Mastication is safe and complete.  Recommend Regular diet and Thin liquids. Recommend esophagram to asssess motility of esophagus, based on pt sx's of globus sensation, reflux and burping up peice of sausage from pizza that was consumed 2 hours prior.   Total Session Time   SLP Eval: VideoFluoroscopic Swallow function Minutes (07758) 15   Total Evaluation Time 15

## 2022-06-03 ENCOUNTER — HOSPITAL ENCOUNTER (OUTPATIENT)
Dept: RADIOLOGY | Facility: HOSPITAL | Age: 52
Discharge: HOME | End: 2022-06-03
Attending: ORTHOPAEDIC SURGERY | Admitting: ORTHOPAEDIC SURGERY
Payer: COMMERCIAL

## 2022-06-03 DIAGNOSIS — Z98.890 HX OF CERVICAL SPINE SURGERY: ICD-10-CM

## 2022-06-03 DIAGNOSIS — R13.10 DYSPHAGIA, UNSPECIFIED TYPE: ICD-10-CM

## 2022-06-03 PROCEDURE — 255N000001 HC RX 255: Performed by: ORTHOPAEDIC SURGERY

## 2022-06-03 PROCEDURE — 74220 X-RAY XM ESOPHAGUS 1CNTRST: CPT

## 2022-06-03 RX ADMIN — ANTACID/ANTIFLATULENT 4 G: 380; 550; 10; 10 GRANULE, EFFERVESCENT ORAL at 08:29

## 2022-06-08 ENCOUNTER — TELEPHONE (OUTPATIENT)
Dept: GASTROENTEROLOGY | Facility: CLINIC | Age: 52
End: 2022-06-08
Payer: COMMERCIAL

## 2022-06-08 ENCOUNTER — VIRTUAL VISIT (OUTPATIENT)
Dept: INFECTIOUS DISEASES | Facility: CLINIC | Age: 52
End: 2022-06-08
Attending: STUDENT IN AN ORGANIZED HEALTH CARE EDUCATION/TRAINING PROGRAM
Payer: COMMERCIAL

## 2022-06-08 DIAGNOSIS — Z51.81 THERAPEUTIC DRUG MONITORING: ICD-10-CM

## 2022-06-08 DIAGNOSIS — M46.22 OSTEOMYELITIS OF CERVICAL SPINE (H): Primary | ICD-10-CM

## 2022-06-08 DIAGNOSIS — K21.9 GASTROESOPHAGEAL REFLUX DISEASE, UNSPECIFIED WHETHER ESOPHAGITIS PRESENT: ICD-10-CM

## 2022-06-08 DIAGNOSIS — M47.12 CERVICAL SPONDYLOSIS WITH MYELOPATHY: ICD-10-CM

## 2022-06-08 PROCEDURE — 99214 OFFICE O/P EST MOD 30 MIN: CPT | Mod: GT | Performed by: STUDENT IN AN ORGANIZED HEALTH CARE EDUCATION/TRAINING PROGRAM

## 2022-06-08 RX ORDER — DOXYCYCLINE HYCLATE 100 MG
100 TABLET ORAL 2 TIMES DAILY
Qty: 180 TABLET | Refills: 1 | Status: SHIPPED | OUTPATIENT
Start: 2022-06-08

## 2022-06-08 RX ORDER — PANTOPRAZOLE SODIUM 40 MG/1
40 TABLET, DELAYED RELEASE ORAL DAILY
Qty: 30 TABLET | Refills: 2 | Status: SHIPPED | OUTPATIENT
Start: 2022-06-08 | End: 2022-10-11

## 2022-06-08 RX ORDER — RIFAMPIN 300 MG/1
600 CAPSULE ORAL DAILY
Qty: 180 CAPSULE | Refills: 1 | Status: SHIPPED | OUTPATIENT
Start: 2022-06-08 | End: 2022-06-23

## 2022-06-08 NOTE — PATIENT INSTRUCTIONS
Start Pantoprazole tablet daily to see if it helps your reflux, until you can be seen by GI  Continue Doxycyline and Rifampin for another 3 months - we will e-evaluate as needed if Doxycyline could be contributing to your symptoms  Follow up with me in 3 months  I will try to see if Gi can expedite your  visit from my end too  Labs at nearby [place] in next 1-2 weeks

## 2022-06-08 NOTE — LETTER
6/8/2022       RE: Dave Calero  [address]  [place] [facility] [address]     Dear Colleague,    Thank you for referring your patient, Dave Calero, to the [facility] INFECTIOUS DISEASE CLINIC [place] at [facility]. Please see a copy of my visit note below.    Heron is a 51 year old who is being evaluated via a billable video visit.      How would you like to obtain your AVS? MyChart  If the video visit is dropped, the invitation should be resent by: Text to cell phone: [phone]  Will anyone else be joining your video visit? No     Dave is a 51 year old who is being evaluated via a billable video visit.      Video Start Time: Approx 432 pm  Video-Visit Details    Type of service:  Video Visit    Video End Time: Approx 4:45 pm  Originating Location (pt. Location):Home    Distant Location (provider location):  [facility] INFECTIOUS DISEASE [facility]     Platform used for Video Visit: well      [facility]  Infectious Disease Clinic Note: Follow up     Patient:  Dave Calero, Date of birth 1970, Medical record number [id number]  Date of Visit: 06/08/22         Assessment and Recommendations:       Assessment:  ID Problem List:  1. Cervical discitis with epidural abscess  -s/p ACDF (2/9) and posterior instrmentation (2/10)  -Intraop cultures with MSSA, Cutibacterium  2. Granulicatella and MSSA bacteremia        Discussion:  52yo M initially admitted with cervical discitis and epidural abscess, s/p ACDF (2/9) and posterior instrumentation (2/10) with intraoperative cultures growing MSSA and Cutibacterium. Also had bacteremia (Granulicatella and MSSA).  With negative PREM, Inpatient ID (Dr Way) felt we can adequately treat for possible hardware infection and bacteremia with Vancomycin + Rifampin to cover MSSA, Granulicatella, and Cutibacterium, anticipated at  least 6 weeks of therapy followed by chronic suppressive regimen.    First seen by me at ID clinic follow up 3/10/22. CRPS had trended down to normal. Pt with no obviouss concerns from an infection standpoint but some headache and difficulty swallowing after cracking his neck in bed one night. Since these are new since postop period reached out to Ortho to check on these earlier than planned follow up.      6 weeks were to be completed 3/25, had a CRP elevation to 12 3/24, so repeated 3/28, and stopped IV Vanc 3/29 after this resulted normal. 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  Anticipated minimum 3 months of Doxycyline+Rifampin, final duration to be decided at 3 month follow up today.    Other than persistent difficulty swallowing he is doing well from infection standpoint. Had speech eval and video swallow, now waiting to hear back from GI regarding esophagogram. Having a lot of acid reflux as well, and regurgitation    There is no clear evidence for duration of chronic suppression, most people get 6 months to a year. Discussed with pt today, he is amenable to continuing another 3 months minimum. I do wonder if Doxy is contributing to his GERD, so will re-evaluate earlier if no other cause found for GERD after GI eval. Pt reports this was only last one month and he was taking Doxy before that so he does not feel it is related likely.     Recommendations:  Started Pantoprazole tablet daily to see if it helps reflux, until he can be seen by GI. This is typically not recommended >12 weeks so advised pt against long term use without GI supervision.   Continue Doxycyline and Rifampin for another 3 months - we will e-evaluate as needed if Doxycyline could be contributing to GERD symptoms  Follow up with me in 3 months  Pt is anxious to get in to see GI, very troubled by dysphagia, I have sent a message to GI RN to see if  they are able to schedule  "him earlier.   Labs to monitor on antibitoics at nearby [place] in next 1-2 weeks: CBC, CMP, CRP      Nazario Tapia   of Medicine  Division of Infectious Diseases         Interval events     Neck issues much better but swallowing still giving a lot of trouble. No weight loss. Unable to eat without laying down sometimes. Had speech eval and video swallow, now waiting to hear back from GI regarding esophagogram. Having a lot of acid reflux as well, and regurgitation    Previous concern from pt and wife last visit about  'bad kidney function' reported per pharmacy was clarified via epic message with JEN CAREY [facility] Felix Ang 3/10/22: \"I spoke to Dave Calero on 3/4/22 and reviewed his labs from 3/3/22.  As you stated his Scr of 0.72 on 3/3 was normal. His SCr on 2/17 was 0.58, on 2/24 0.68 and on 3/4/22 was 0.72. My documentation on 3/4 states that I encouraged patient to drink enough fluids to keep kidneys hydrated and that urine color should be lemonade like, which is a general advise we give to patients on IV vancomycin to avoid dehydration.\" - presume this was just misunderstood as bad kidney function at the time by pt.         No past medical history on file.    Past Surgical History:   Procedure Laterality Date     OPTICAL TRACKING SYSTEM FUSION POSTERIOR CERVICAL THREE + LEVELS N/A 2/12/2022    Procedure: Posterior instrumented spinal fusion cervical 5 to thoracic 2, with laminectomies at Cervical 5, Cervical 6, Smithe Tucker Oseotomy Cervical 5-6, Cervical 6-7 ;  Surgeon: Kulwinder Calixto MD;  Location: [facility] OR     OPTICAL TRACKING SYSTEM FUSION POSTERIOR SPINE LUMBAR N/A 2/9/2022    Procedure: Anterior cervical diskectomy and fusion, cervical 5 to thoracic 1 (3 levels); right or anterior iliac crest autograft harvest. ;  Surgeon: Kulwinder Calixto MD;  Location: [facility] OR     TRANSESOPHAGEAL ECHOCARDIOGRAM INTRAOPERATIVE N/A 2/15/2022    Procedure: ECHOCARDIOGRAM, " TRANSESOPHAGEAL (PREM), INTRAOPERATIVE;  Surgeon: GENERIC ANESTHESIA PROVIDER;  Location: [facility] OR       No family history on file.    Social History     Social History Narrative     Not on file     Social History     Tobacco Use     Smoking status: Former Smoker     Smokeless tobacco: Never Used       Immunization History   Administered Date(s) Administered     COVID-19,PF,Moderna 04/14/2021, 05/12/2021       Patient Active Problem List   Diagnosis     Discitis of cervical region     Epidural abscess     Hard to intubate     Cervical spondylosis with myelopathy     Encounter for screening laboratory testing for severe acute respiratory syndrome coronavirus 2 (SARS-CoV-2)     Neck pain     Arthrodesis status       Current Outpatient Medications   Medication Sig     acetaminophen (TYLENOL) 500 MG tablet Take 1-2 tablets (500-1,000 mg) by mouth every 6 hours as needed for mild pain     amphetamine-dextroamphetamine (ADDERALL XR) 30 MG 24 hr capsule Take 30 mg by mouth daily     baclofen (LIORESAL) 10 MG tablet Take 5-10 mg by mouth 3 times daily as needed for muscle spasms     camphor-menthol-methyl sal 4-10-30 % CREA Externally apply topically 3 times daily as needed (pain)     cetirizine (ZYRTEC) 10 MG tablet Take 10 mg by mouth daily     diazepam (VALIUM) 5 MG tablet Take 1 tablet (5 mg) by mouth every 6 hours as needed for anxiety or muscle spasms     doxycycline hyclate (VIBRA-TABS) 100 MG tablet Take 1 tablet (100 mg) by mouth 2 times daily     FLUoxetine (PROZAC) 10 MG capsule Take 10 mg by mouth daily Take with the 20mg capsule for a total daily dose of 30mg     FLUoxetine (PROZAC) 20 MG capsule Take 20 mg by mouth daily Take with the 10mg capsule for a total daily dose of 30mg     gabapentin (NEURONTIN) 300 MG capsule Take 300mg by mouth at bedtime for 2 days, then increase to 600mg at bedtime daily     hydrOXYzine (ATARAX) 50 MG tablet Take 1 tablet (50 mg) by mouth every 6 hours as needed for itching or other  (pain adjunct)     ibuprofen (ADVIL/MOTRIN) 200 MG tablet Take 200 mg by mouth every 4 hours as needed for mild pain     methocarbamol (ROBAXIN) 750 MG tablet Take 1 tablet (750 mg) by mouth 4 times daily as needed for muscle spasms     oxyCODONE (ROXICODONE) 5 MG tablet Take 1 tablet (5 mg) by mouth every 4 hours     pantoprazole (PROTONIX) 40 MG EC tablet Take 1 tablet (40 mg) by mouth daily     rifampin (RIFADIN) 300 MG capsule Take 2 capsules (600 mg) by mouth daily     senna-docusate (SENOKOT-S/PERICOLACE) 8.6-50 MG tablet Take 1 tablet by mouth 2 times daily     VITAMIN D PO Take 1 tablet by mouth daily     polyethylene glycol (MIRALAX) 17 g packet Take 17 g by mouth daily (Patient not taking: No sig reported)     No current facility-administered medications for this visit.       No Known Allergies           Physical Exam:     There were no vitals taken for this visit.    Limited video Exam:  GENERAL:  well-developed, well-nourished, alert, oriented, in no acute distress.  HEENT:  Head is normocephalic, atraumatic, collar now off  EYES:  Eyes have anicteric sclerae.    LUNGS:  Breathing comfortably  SKIN:  No acute rashes.    NEUROLOGIC:  Grossly nonfocal.         Laboratory Data:     Inflammatory Markers    Recent Labs   Lab Test 03/28/22  1315 03/24/22  1830 03/17/22  1830 03/10/22  1815 03/03/22  1810 02/24/22  1900 02/17/22  0845 02/09/22  1438   CRP 4.6 12.0* 3.3 4.9 6.1 14.0* 53.2* 74.0*       Metabolic Studies       Recent Labs   Lab Test 03/24/22  1830 03/17/22  1830 03/10/22  1815 03/03/22  1810 02/24/22  1900 02/17/22  0845 02/15/22  1211 02/14/22  1803 02/13/22  0634 02/12/22  1153    139 138 140 139 139  --   --    < > 138   POTASSIUM 3.7 4.0 4.3 4.2 4.2 3.8  --   --    < > 3.8   CHLORIDE 107 107 105 107 106 104  --   --    < >  --    CO2 28 29 29 28 28 30  --   --    < >  --    ANIONGAP 5 3 4 5 5 5  --   --    < >  --    BUN 13 12 15 10 13 10  --   --    < >  --    CR 0.86 0.77 0.76 0.72  0.68 0.58*  --   --    < >  --    GFRESTIMATED >90 >90 >90 >90 >90 >90  --   --    < >  --    GLC 94 90 78 98 122* 101*   < >  --    < > 117*   VIANEY 8.9 8.9 8.8 9.0 8.6 9.5  --   --    < >  --    LACT  --   --   --   --   --   --   --  1.3  --  0.8    < > = values in this interval not displayed.       Hematology Studies      Recent Labs   Lab Test 03/24/22  1830 03/17/22  1830 03/10/22  1815 03/03/22  1810 02/24/22  1900 02/17/22  0845   WBC 7.4 7.0 6.0 7.9 9.2 7.6   HGB 11.9* 12.6* 12.0* 11.3* 10.4* 9.1*   HCT 35.8* 38.8* 36.8* 36.0* 33.2* 28.5*    314 353 476* 515* 603*       Clotting Studies    Recent Labs   Lab Test 02/09/22  1438   INR 0.98       Urine Studies     Recent Labs   Lab Test 02/10/22  1918   URINEPH 5.5   NITRITE Negative   LEUKEST Negative   WBCU 1       Imaging:  Results for orders placed or performed during the hospital encounter of 02/09/22   XR Surgery DEVORA Fluoro L/T 5 Min w Stills    Narrative    Exam: XR SURGERY DEVORA FLUORO LESS THAN 5 MIN W STILLS, 2/9/2022 11:33  PM    Provided History: Anterior cervical discectomy and fusion, possible  anterior plate fixation C5-T1, right or left anterior iliac crest  autograft harvest  ICD-10:    Comparison: None.    Technique: Intraoperative imaging.    Findings:    A single lateral intraoperative image from 11:13 PM 2/9/2022 shows  linear surgical probe tip projecting over the anterior aspect of the  inferior endplate of C6.    Additional surgical clamp projects over the C6-7 intervertebral disc  space. Endotracheal tube is partially imaged.       Impression    Impression: Instrumentation projecting toward anterior aspect of the  inferior endplate of C6.    The above indicated surgical level was reported to operating room  personnel, specifically Dr Calixto, via telephone by Dr Andujar  on 2/9/2022 11:33 PM.    I have personally reviewed the examination and initial interpretation  and I agree with the findings.    LAUREN MEAD MD          SYSTEM ID:  [id number]   MR Cervical Spine w/o & w Contrast    Narrative    MR CERVICAL SPINE W/O & W CONTRAST 2/10/2022 3:00 PM    Provided History: Epidural abscess; s/p acdf  MR CERVICAL SPINE W/O & W CONTRAST 2/10/2022 3:00 PM    Provided History: Epidural abscess; s/p acdf    Comparison: Same day CT cervical spine    Technique: Sagittal T1-weighted, sagittal T2-weighted, sagittal  diffusion weighted, axial T2-weighted images of the cervical spine  were obtained without intravenous contrast. Following intravenous  administration of gadolinium, axial and sagittal T1-weighted images  with fat saturation were also obtained.    Contrast: 7.5ML iv Gadavist    Findings:  Minimal anterolisthesis C2-3.    Postsurgical changes of anterior fusion with interbody devices at  C5-6, C7-T1, and T1-2.      Bone marrow edema and associated enhancement involving the C6-T2  vertebral bodies. Bone marrow edema extends into T1 and T2 posterior  elements    Extensive retropharyngeal/prevertebral soft tissue edema and  enhancement extending from C5 down to T3 level. Anterior and posterior  epidural T2 hyperintense signal with associated enhancement extending  from C5-6 down to T2-3. Edema and enhancement extends into bilateral  neural foramina from C6 to T2. Associated severe spinal canal stenosis  at C6-7 at C7-T1. Edema and associated enhancement in the interspinous  region at C7-T1 and T1-T2 levels. Moderate spinal canal stenosis C5-6.  No definite epidural fluid collection.    Multilevel disc height narrowing and disc desiccation. On a level by  level basis;    C2-3: Bilateral uncovertebral spurring and facet hypertrophy resulting  in mild bilateral neural stenosis. No spinal canal stenosis.    C3-4: Disc osteophyte complex and bilateral uncinate spurring,  eccentric to the left. Left greater than right facet hypertrophy.  Severe left neural foraminal stenosis. Mild right neural foraminal  stenosis. No spinal canal  stenosis.    C4-5: Uncovertebral spurring in the right greater than left facet  hypertrophy. Mild to moderate right and mild left neural foraminal  stenosis. No spinal canal stenosis.    C5-6: Fusion level. Severe bilateral neural foraminal and moderate  spinal canal stenosis secondary to uncovertebral spurring and  superimposed epidural inflammatory findings.    C6-7: Disc osteophyte complex and bilateral uncinate spurring.  Bilateral facet hypertrophy. Epidural inflammatory/infectious findings  contribute to severe bilateral neural foraminal and severe spinal  canal stenosis.     C7-T1: Fusion level. Epidural inflammatory/infectious findings  contribute to severe bilateral neural foraminal and severe spinal  canal stenosis      Impression    Impression:   1. Early postsurgical changes of instrumented posterior spinal fusion  with interbody devices at C5-C6, C7-T1 and T1-T2.   2. Bone marrow edema and associated enhancement involving the C6-T2  vertebrae, consistent with osteomyelitis. Epidural  phlegmon extending  from phlegmon C5-6 down to T2-3. There are several questionable tiny  fluid pockets pockets, for example in the anterior epidural space at  C6 level. However it is mostly phlegmon. Edema and enhancement extends  into bilateral neural foramina from C6 to T2. Associated severe spinal  canal stenosis at C6-7 at C7-T1 and moderate spinal canal stenosis  C5-6.  3. Extensive retropharyngeal/prevertebral soft tissue edema and  enhancement extending from C5 down to T3 level. This is combination of  postsurgical changes and inflammatory/infectious findings.     I have personally reviewed the examination and initial interpretation  and I agree with the findings.    ANDRE ESTRADA MD         SYSTEM ID:  [id number]   CT Cervical Spine w/o Contrast    Narrative    CT CERVICAL SPINE W/O CONTRAST 2/10/2022 12:06 PM    Provided History: Epidural abscess; s/p anterior fusion; to OR Friday  or Saturday for posterior  approach    Comparison: None available    Technique: Using multidetector thin collimation helical acquisition  technique, axial, coronal and sagittal CT images through the cervical  spine were obtained without intravenous contrast.     Findings:  Postsurgical changes of anterior cervical discectomy and fusion with  interbody devices at C5-6, C7-T1 and T1-2. Associated postoperative  prevertebral soft tissue edema and emphysema. The cervical vertebrae  are normally aligned. Trace anterolisthesis of C3 on C4. Straightening  of the expected cervical lordosis. No acute fracture or subluxation.  Heterogeneous sclerotic changes of the C5 and C6 vertebral bodies.  Multilevel degenerative changes with osteophytic and uncovertebral  spurring, facet arthropathy, and moderate disc space narrowing at C6-7  The findings on a level by level basis are as follows:    C2-3:  Bilateral uncovertebral and facet hypertrophy, left greater  than right. Mild left neuroforaminal narrowing. No right  neuroforaminal or spinal canal stenosis.    C3-4:  Disc osteophyte complex. Bilateral uncovertebral and facet  hypertrophy, left greater than right. Mild to moderate left  neuroforaminal narrowing. No right neuroforaminal or spinal canal  stenosis    C4-5:  Bilateral uncovertebral and facet hypertrophy. No spinal canal  or neural foraminal stenosis.    C5-6:  Bilateral uncovertebral and facet hypertrophy, right greater  the left. Moderate bilateral neural foraminal narrowing. No spinal  canal stenosis.    C6-7:  Disc osteophyte complex. Bilateral uncovertebral and facet  hypertrophy. Mild-to-moderate bilateral neural foraminal narrowing. No  spinal canal stenosis.    C7-T1:  No spinal canal or neural foraminal stenosis.       Impression    Impression:   1. Postsurgical changes of anterior cervical discectomy and fusion  C5-6 and C7-T2 with associated postoperative prevertebral soft tissue  edema and emphysema. Consider MRI for evaluation of soft  tissue  abscess and osteomyelitis.  2. Multilevel cervical spondylosis as described above.    I have personally reviewed the examination and initial interpretation  and I agree with the findings.    HOME ROSE MD         SYSTEM ID:  [id number]   XR Surgery DEVORA L/T 5 Min Fluoro w Stills    Narrative    This exam was marked as non-reportable because it will not be read by a   radiologist or a [place] non-radiologist provider.         XR Cervical Spine 2/3 Views    Narrative    XR CERVICAL SPINE 2/3 VWS 2022 8:29 AM    Provided History: status post Anterior and posterior cervical fusion   ICD-10:    Comparison: MRI cervical spine 2/10/2022. CT cervical spine 2/10/2022.    Findings: AP and lateral views of the cervical spine were obtained.  Posterior fusion instrumentation from C5-T2. Intervening lower  cervical laminectomies. Interbody spacers at C5-6, C7-T1, and T1-2.  Straightening of cervical lordosis. Focal kyphosis at C7-T1 and grade  1 anterolisthesis of C7 on T1. Moderate prevertebral edema. Multilevel  degenerative changes and cutting endplate osteophytosis, uncinate  hypertrophy, and facet hypertrophy.    No mass in the visualized lung apices.      Impression    Impression:  1. New posterior fusion instrumentation from C5-T2.  2. Persistent changes of ACDF at C5-6, C7-T1, and T1-2.  3. Persistent prevertebral edema.         DIANDRA BROWN MD         SYSTEM ID:  [id number]   Echo Complete     Value    LVEF  55-60%    Narrative    [id number]  [id number]  [id number]  [id number]^DENVER^CARLO^LANA     [facility],[place]  Echocardiography Laboratory  [address] [place] [id number]     Name: SONYA ARAUJO  MRN: [id number]  : 1970  Study Date: 2022 02:35 PM  Age: 51 yrs  Gender: Male  Patient Location: [facility]  Reason For Study: Endocarditis  Ordering Physician: CARLO GOFF  Performed By: Rachel Dunbar RDCS     BSA: 1.9 m2  Height: 69  in  Weight: 174 lb  HR: 109  BP: 135/80 mmHg  ______________________________________________________________________________  Procedure  Complete Portable Echo Adult. Contrast Optison. Patient was given 5 ml mixture  of 3 ml Optison and 6 ml saline. 4 ml wasted.  ______________________________________________________________________________  Interpretation Summary  Global and regional left ventricular function is normal with an EF of 55-60%.  The right ventricle is normal in function and size.  No significant valvular abnormality.  No pericardial effusion is present.  IVC diameter <2.1 cm collapsing >50% with sniff suggests a normal RA pressure  of 3 mmHg.  There is no prior study for direct comparison.  ______________________________________________________________________________  Left Ventricle  Left ventricular size is normal. Global and regional left ventricular function  is normal with an EF of 55-60%. Thickening of the anterobasal septum is  present. Diastolic function not assessed due to tachycardia. No regional wall  motion abnormalities are seen.     Right Ventricle  The right ventricle is normal size. Global right ventricular function is  normal.     Atria  Both atria appear normal.     Mitral Valve  The mitral valve is normal. Trace mitral insufficiency is present.     Tricuspid Valve  The tricuspid valve is normal. Trace tricuspid insufficiency is present. The  peak velocity of the tricuspid regurgitant jet is not obtainable.     Pulmonic Valve  On Doppler interrogation, there is no significant stenosis or regurgitation.  The valve leaflets are not well visualized.     Vessels  The aorta root is normal. The thoracic aorta is normal. IVC diameter <2.1 cm  collapsing >50% with sniff suggests a normal RA pressure of 3 mmHg.     Pericardium  No pericardial effusion is present.     Compared to Previous Study  There is no prior study for direct comparison.      ______________________________________________________________________________  MMode/2D Measurements & Calculations  IVSd: 1.3 cm  LVIDd: 3.9 cm  LVIDs: 3.1 cm  LVPWd: 0.82 cm  FS: 21.3 %     LV mass(C)d: 132.4 grams  LV mass(C)dI: 68.0 grams/m2  Ao root diam: 2.9 cm  asc Aorta Diam: 2.7 cm  LVOT diam: 2.0 cm  LVOT area: 3.1 cm2  LA Volume (BP): 24.0 ml  LA Volume Index (BP): 12.3 ml/m2  RWT: 0.42     Doppler Measurements & Calculations  PA acc time: 0.08 sec     ______________________________________________________________________________  Report approved by: Farhan Stevenson 2022 03:30 PM         Transesophageal Echocardiogram     Value    LVEF  60-65%    Narrative    [id number]  [facility]  [id number]  [id number]^[place]^CARLO^LANA     [facility],[place]  Echocardiography Laboratory  [address] [place] [id number]     Name: SONYA ARAUJO  MRN: [id number]  : 1970  Study Date: 02/15/2022 01:42 PM  Age: 51 yrs  Gender: Male  Patient Location: [facility]  Reason For Study: 2nd degree AV Block, Endocarditis  Ordering Physician: CARLO GOFF  Performed By: Son Vale     Attestation  I was present during PREM probe placement by the fellow, Son Vale. I  personally viewed the imaging and agree with the interpretation and report as  documented by the fellow.  ______________________________________________________________________________  Interpretation Summary  Normal biventricular function.  No valvular abnormalities or vegetations noted.  No pericardial effusion present.  ______________________________________________________________________________  Procedure  Intraoperative Transesophageal Echocardiogram with color and spectral Doppler  performed. 3D image acquisition, reconstruction, and real-time interpretation  was performed. Procedure location Operating Room. Informed consent for  Transesophegeal echo obtained. PREM Probe #[id number]  was used during the procedure.  Sedation, endotracheal intubation, and mechanical ventilation were initiated  prior to the PREM and were monitored by anesthesia. The Transducer was inserted  without difficulty . Complications None. The patient tolerated the procedure  well. The patient's rhythm is sinus tachycardia. Adequate quality two-  dimensional was performed and interpreted. Adequate quality color and spectral  Doppler were performed and interpreted.     Left Ventricle  Left ventricular size is normal. Global and regional left ventricular function  is normal with an EF of 60-65%.     Right Ventricle  The right ventricle is normal size. Global right ventricular function is  normal.     Atria  Both atria appear normal. The left atrial appendage is normal. It is free of  spontaneous echo contrast and thrombus. No left atrial mass or thrombus  visualized. The atrial septum is intact as assessed by color Doppler .     Mitral Valve  The mitral valve is normal. Trace mitral insufficiency is present.     Aortic Valve  Aortic valve is normal in structure and function. The aortic valve is  tricuspid. No aortic regurgitation is present.     Tricuspid Valve  The tricuspid valve is normal. Trace tricuspid insufficiency is present.     Pulmonic Valve  The pulmonic valve is normal.     Vessels  The aorta root is normal.     Pericardium  No pericardial effusion is present.     Miscellaneous  Transgastric imaging was not performed.     ______________________________________________________________________________  Report approved by: Catherine Meraz 02/15/2022 03:20 PM     ______________________________________________________________________________

## 2022-06-08 NOTE — TELEPHONE ENCOUNTER
[facility] Call Center    Phone Message    May a detailed message be left on voicemail: yes     Reason for Call: Appointment Intake    Referring Provider Name: Kulwinder Calixto MD  Diagnosis and/or Symptoms: Dysphagia, unspecified type [R13.10]    Patient is being referred for Dysphagia and is not experiencing significant weight loss. First available is 30+ days out. Please review per scheduling guidelines. Thanks!     Action Taken: Message routed to:  [facility] ([facility] GI    Travel Screening: Not Applicable

## 2022-06-08 NOTE — PROGRESS NOTES
Heron is a 51 year old who is being evaluated via a billable video visit.      How would you like to obtain your AVS? MyChart  If the video visit is dropped, the invitation should be resent by: Text to cell phone: [phone]  Will anyone else be joining your video visit? Meghan Acosta is a 51 year old who is being evaluated via a billable video visit.      Video Start Time: Approx 432 pm  Video-Visit Details    Type of service:  Video Visit    Video End Time: Approx 4:45 pm  Originating Location (pt. Location):Home    Distant Location (provider location):  [facility] INFECTI[facility] DISE[facility] [place]     Platform used for Video Visit: well      [facility]  Infectious Disease Clinic Note: Follow up     Patient:  Dave Calero, Date of birth 1970, Medical record number [id number]  Date of Visit: 06/08/22         Assessment and Recommendations:       Assessment:  ID Problem List:  1. Cervical discitis with epidural abscess  -s/p ACDF (2/9) and posterior instrmentation (2/10)  -Intraop cultures with MSSA, Cutibacterium  2. Granulicatella and MSSA bacteremia        Discussion:  52yo M initially admitted with cervical discitis and epidural abscess, s/p ACDF (2/9) and posterior instrumentation (2/10) with intraoperative cultures growing MSSA and Cutibacterium. Also had bacteremia (Granulicatella and MSSA).  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    First seen by me at ID clinic follow up 3/10/22. CRPS had trended down to normal. Pt with no obviouss concerns from an infection standpoint but some headache and difficulty swallowing after cracking his neck in bed one night. Since these are new since postop period reached out to  Ortho to check on these earlier than planned follow up.      6 weeks were to be completed 3/25, had a CRP elevation to 12 3/24, so repeated 3/28, and stopped IV Vanc 3/29 after this resulted normal. 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  Anticipated minimum 3 months of Doxycyline+Rifampin, final duration to be decided at 3 month follow up today.    Other than persistent difficulty swallowing he is doing well from infection standpoint. Had speech eval and video swallow, now waiting to hear back from GI regarding esophagogram. Having a lot of acid reflux as well, and regurgitation    There is no clear evidence for duration of chronic suppression, most people get 6 months to a year. Discussed with pt today, he is amenable to continuing another 3 months minimum. I do wonder if Doxy is contributing to his GERD, so will re-evaluate earlier if no other cause found for GERD after GI eval. Pt reports this was only last one month and he was taking Doxy before that so he does not feel it is related likely.     Recommendations:  Started Pantoprazole tablet daily to see if it helps reflux, until he can be seen by GI. This is typically not recommended >12 weeks so advised pt against long term use without GI supervision.   Continue Doxycyline and Rifampin for another 3 months - we will e-evaluate as needed if Doxycyline could be contributing to GERD symptoms  Follow up with me in 3 months  Pt is anxious to get in to see GI, very troubled by dysphagia, I have sent a message to GI RN to see if  they are able to schedule him earlier.   Labs to monitor on antibitoics at nearby [place] in next 1-2 weeks: CBC, CMP, CRP      Nazario Tapia   of Medicine  Division of Infectious Diseases         Interval events     Neck issues much better but swallowing still giving a lot of trouble. No weight loss. Unable to eat without laying down sometimes. Had  "speech eval and video swallow, now waiting to hear back from GI regarding esophagogram. Having a lot of acid reflux as well, and regurgitation    Previous concern from pt and wife last visit about  'bad kidney function' reported per pharmacy was clarified via epic message with JEN CAREY RPH Jackelier Ang 3/10/22: \"I spoke to Dave Calero on 3/4/22 and reviewed his labs from 3/3/22.  As you stated his Scr of 0.72 on 3/3 was normal. His SCr on 2/17 was 0.58, on 2/24 0.68 and on 3/4/22 was 0.72. My documentation on 3/4 states that I encouraged patient to drink enough fluids to keep kidneys hydrated and that urine color should be lemonade like, which is a general advise we give to patients on IV vancomycin to avoid dehydration.\" - presume this was just misunderstood as bad kidney function at the time by pt.         No past medical history on file.    Past Surgical History:   Procedure Laterality Date     OPTICAL TRACKING SYSTEM FUSION POSTERIOR CERVICAL THREE + LEVELS N/A 2/12/2022    Procedure: Posterior instrumented spinal fusion cervical 5 to thoracic 2, with laminectomies at Cervical 5, Cervical 6, Smithe Tucker Oseotomy Cervical 5-6, Cervical 6-7 ;  Surgeon: Kulwinder Calixto MD;  Location: [facility] OR     OPTICAL TRACKING SYSTEM FUSION POSTERIOR SPINE LUMBAR N/A 2/9/2022    Procedure: Anterior cervical diskectomy and fusion, cervical 5 to thoracic 1 (3 levels); right or anterior iliac crest autograft harvest. ;  Surgeon: Kulwinder Calixto MD;  Location: [facility] OR     TRANSESOPHAGEAL ECHOCARDIOGRAM INTRAOPERATIVE N/A 2/15/2022    Procedure: ECHOCARDIOGRAM, TRANSESOPHAGEAL (PREM), INTRAOPERATIVE;  Surgeon: GENERIC ANESTHESIA PROVIDER;  Location: [facility] OR       No family history on file.    Social History     Social History Narrative     Not on file     Social History     Tobacco Use     Smoking status: Former Smoker     Smokeless tobacco: Never Used       Immunization History   Administered Date(s) " Administered     COVID-19,PF,Moderna 04/14/2021, 05/12/2021       Patient Active Problem List   Diagnosis     Discitis of cervical region     Epidural abscess     Hard to intubate     Cervical spondylosis with myelopathy     Encounter for screening laboratory testing for severe acute respiratory syndrome coronavirus 2 (SARS-CoV-2)     Neck pain     Arthrodesis status       Current Outpatient Medications   Medication Sig     acetaminophen (TYLENOL) 500 MG tablet Take 1-2 tablets (500-1,000 mg) by mouth every 6 hours as needed for mild pain     amphetamine-dextroamphetamine (ADDERALL XR) 30 MG 24 hr capsule Take 30 mg by mouth daily     baclofen (LIORESAL) 10 MG tablet Take 5-10 mg by mouth 3 times daily as needed for muscle spasms     camphor-menthol-methyl sal 4-10-30 % CREA Externally apply topically 3 times daily as needed (pain)     cetirizine (ZYRTEC) 10 MG tablet Take 10 mg by mouth daily     diazepam (VALIUM) 5 MG tablet Take 1 tablet (5 mg) by mouth every 6 hours as needed for anxiety or muscle spasms     doxycycline hyclate (VIBRA-TABS) 100 MG tablet Take 1 tablet (100 mg) by mouth 2 times daily     FLUoxetine (PROZAC) 10 MG capsule Take 10 mg by mouth daily Take with the 20mg capsule for a total daily dose of 30mg     FLUoxetine (PROZAC) 20 MG capsule Take 20 mg by mouth daily Take with the 10mg capsule for a total daily dose of 30mg     gabapentin (NEURONTIN) 300 MG capsule Take 300mg by mouth at bedtime for 2 days, then increase to 600mg at bedtime daily     hydrOXYzine (ATARAX) 50 MG tablet Take 1 tablet (50 mg) by mouth every 6 hours as needed for itching or other (pain adjunct)     ibuprofen (ADVIL/MOTRIN) 200 MG tablet Take 200 mg by mouth every 4 hours as needed for mild pain     methocarbamol (ROBAXIN) 750 MG tablet Take 1 tablet (750 mg) by mouth 4 times daily as needed for muscle spasms     oxyCODONE (ROXICODONE) 5 MG tablet Take 1 tablet (5 mg) by mouth every 4 hours     pantoprazole (PROTONIX)  40 MG EC tablet Take 1 tablet (40 mg) by mouth daily     rifampin (RIFADIN) 300 MG capsule Take 2 capsules (600 mg) by mouth daily     senna-docusate (SENOKOT-S/PERICOLACE) 8.6-50 MG tablet Take 1 tablet by mouth 2 times daily     VITAMIN D PO Take 1 tablet by mouth daily     polyethylene glycol (MIRALAX) 17 g packet Take 17 g by mouth daily (Patient not taking: No sig reported)     No current facility-administered medications for this visit.       No Known Allergies           Physical Exam:     There were no vitals taken for this visit.    Limited video Exam:  GENERAL:  well-developed, well-nourished, alert, oriented, in no acute distress.  HEENT:  Head is normocephalic, atraumatic, collar now off  EYES:  Eyes have anicteric sclerae.    LUNGS:  Breathing comfortably  SKIN:  No acute rashes.    NEUROLOGIC:  Grossly nonfocal.         Laboratory Data:     Inflammatory Markers    Recent Labs   Lab Test 03/28/22  1315 03/24/22  1830 03/17/22  1830 03/10/22  1815 03/03/22  1810 02/24/22  1900 02/17/22  0845 02/09/22  1438   CRP 4.6 12.0* 3.3 4.9 6.1 14.0* 53.2* 74.0*       Metabolic Studies       Recent Labs   Lab Test 03/24/22  1830 03/17/22  1830 03/10/22  1815 03/03/22  1810 02/24/22  1900 02/17/22  0845 02/15/22  1211 02/14/22  1803 02/13/22  0634 02/12/22  1153    139 138 140 139 139  --   --    < > 138   POTASSIUM 3.7 4.0 4.3 4.2 4.2 3.8  --   --    < > 3.8   CHLORIDE 107 107 105 107 106 104  --   --    < >  --    CO2 28 29 29 28 28 30  --   --    < >  --    ANIONGAP 5 3 4 5 5 5  --   --    < >  --    BUN 13 12 15 10 13 10  --   --    < >  --    CR 0.86 0.77 0.76 0.72 0.68 0.58*  --   --    < >  --    GFRESTIMATED >90 >90 >90 >90 >90 >90  --   --    < >  --    GLC 94 90 78 98 122* 101*   < >  --    < > 117*   VIANEY 8.9 8.9 8.8 9.0 8.6 9.5  --   --    < >  --    LACT  --   --   --   --   --   --   --  1.3  --  0.8    < > = values in this interval not displayed.       Hematology Studies      Recent Labs   Lab  Test 03/24/22  1830 03/17/22  1830 03/10/22  1815 03/03/22  1810 02/24/22  1900 02/17/22  0845   WBC 7.4 7.0 6.0 7.9 9.2 7.6   HGB 11.9* 12.6* 12.0* 11.3* 10.4* 9.1*   HCT 35.8* 38.8* 36.8* 36.0* 33.2* 28.5*    314 353 476* 515* 603*       Clotting Studies    Recent Labs   Lab Test 02/09/22  1438   INR 0.98       Urine Studies     Recent Labs   Lab Test 02/10/22  1918   URINEPH 5.5   NITRITE Negative   LEUKEST Negative   WBCU 1       Imaging:  Results for orders placed or performed during the hospital encounter of 02/09/22   XR Surgery DEVORA Fluoro L/T 5 Min w Stills    Narrative    Exam: XR SURGERY DEVORA FLUORO LESS THAN 5 MIN W STILLS, 2/9/2022 11:33  PM    Provided History: Anterior cervical discectomy and fusion, possible  anterior plate fixation C5-T1, right or left anterior iliac crest  autograft harvest  ICD-10:    Comparison: None.    Technique: Intraoperative imaging.    Findings:    A single lateral intraoperative image from 11:13 PM 2/9/2022 shows  linear surgical probe tip projecting over the anterior aspect of the  inferior endplate of C6.    Additional surgical clamp projects over the C6-7 intervertebral disc  space. Endotracheal tube is partially imaged.       Impression    Impression: Instrumentation projecting toward anterior aspect of the  inferior endplate of C6.    The above indicated surgical level was reported to operating room  personnel, specifically Dr Calixto, via telephone by Dr Andujar  on 2/9/2022 11:33 PM.    I have personally reviewed the examination and initial interpretation  and I agree with the findings.    LAUREN MEAD MD         SYSTEM ID:  [id number]   MR Cervical Spine w/o & w Contrast    Narrative    MR CERVICAL SPINE W/O & W CONTRAST 2/10/2022 3:00 PM    Provided History: Epidural abscess; s/p acdf  MR CERVICAL SPINE W/O & W CONTRAST 2/10/2022 3:00 PM    Provided History: Epidural abscess; s/p acdf    Comparison: Same day CT cervical spine    Technique: Sagittal  T1-weighted, sagittal T2-weighted, sagittal  diffusion weighted, axial T2-weighted images of the cervical spine  were obtained without intravenous contrast. Following intravenous  administration of gadolinium, axial and sagittal T1-weighted images  with fat saturation were also obtained.    Contrast: 7.5ML iv Gadavist    Findings:  Minimal anterolisthesis C2-3.    Postsurgical changes of anterior fusion with interbody devices at  C5-6, C7-T1, and T1-2.      Bone marrow edema and associated enhancement involving the C6-T2  vertebral bodies. Bone marrow edema extends into T1 and T2 posterior  elements    Extensive retropharyngeal/prevertebral soft tissue edema and  enhancement extending from C5 down to T3 level. Anterior and posterior  epidural T2 hyperintense signal with associated enhancement extending  from C5-6 down to T2-3. Edema and enhancement extends into bilateral  neural foramina from C6 to T2. Associated severe spinal canal stenosis  at C6-7 at C7-T1. Edema and associated enhancement in the interspinous  region at C7-T1 and T1-T2 levels. Moderate spinal canal stenosis C5-6.  No definite epidural fluid collection.    Multilevel disc height narrowing and disc desiccation. On a level by  level basis;    C2-3: Bilateral uncovertebral spurring and facet hypertrophy resulting  in mild bilateral neural stenosis. No spinal canal stenosis.    C3-4: Disc osteophyte complex and bilateral uncinate spurring,  eccentric to the left. Left greater than right facet hypertrophy.  Severe left neural foraminal stenosis. Mild right neural foraminal  stenosis. No spinal canal stenosis.    C4-5: Uncovertebral spurring in the right greater than left facet  hypertrophy. Mild to moderate right and mild left neural foraminal  stenosis. No spinal canal stenosis.    C5-6: Fusion level. Severe bilateral neural foraminal and moderate  spinal canal stenosis secondary to uncovertebral spurring and  superimposed epidural inflammatory  findings.    C6-7: Disc osteophyte complex and bilateral uncinate spurring.  Bilateral facet hypertrophy. Epidural inflammatory/infectious findings  contribute to severe bilateral neural foraminal and severe spinal  canal stenosis.     C7-T1: Fusion level. Epidural inflammatory/infectious findings  contribute to severe bilateral neural foraminal and severe spinal  canal stenosis      Impression    Impression:   1. Early postsurgical changes of instrumented posterior spinal fusion  with interbody devices at C5-C6, C7-T1 and T1-T2.   2. Bone marrow edema and associated enhancement involving the C6-T2  vertebrae, consistent with osteomyelitis. Epidural  phlegmon extending  from phlegmon C5-6 down to T2-3. There are several questionable tiny  fluid pockets pockets, for example in the anterior epidural space at  C6 level. However it is mostly phlegmon. Edema and enhancement extends  into bilateral neural foramina from C6 to T2. Associated severe spinal  canal stenosis at C6-7 at C7-T1 and moderate spinal canal stenosis  C5-6.  3. Extensive retropharyngeal/prevertebral soft tissue edema and  enhancement extending from C5 down to T3 level. This is combination of  postsurgical changes and inflammatory/infectious findings.     I have personally reviewed the examination and initial interpretation  and I agree with the findings.    ANDRE ESTRADA MD         SYSTEM ID:  [id number]   CT Cervical Spine w/o Contrast    Narrative    CT CERVICAL SPINE W/O CONTRAST 2/10/2022 12:06 PM    Provided History: Epidural abscess; s/p anterior fusion; to OR Friday  or Saturday for posterior approach    Comparison: None available    Technique: Using multidetector thin collimation helical acquisition  technique, axial, coronal and sagittal CT images through the cervical  spine were obtained without intravenous contrast.     Findings:  Postsurgical changes of anterior cervical discectomy and fusion with  interbody devices at C5-6, C7-T1 and  T1-2. Associated postoperative  prevertebral soft tissue edema and emphysema. The cervical vertebrae  are normally aligned. Trace anterolisthesis of C3 on C4. Straightening  of the expected cervical lordosis. No acute fracture or subluxation.  Heterogeneous sclerotic changes of the C5 and C6 vertebral bodies.  Multilevel degenerative changes with osteophytic and uncovertebral  spurring, facet arthropathy, and moderate disc space narrowing at C6-7  The findings on a level by level basis are as follows:    C2-3:  Bilateral uncovertebral and facet hypertrophy, left greater  than right. Mild left neuroforaminal narrowing. No right  neuroforaminal or spinal canal stenosis.    C3-4:  Disc osteophyte complex. Bilateral uncovertebral and facet  hypertrophy, left greater than right. Mild to moderate left  neuroforaminal narrowing. No right neuroforaminal or spinal canal  stenosis    C4-5:  Bilateral uncovertebral and facet hypertrophy. No spinal canal  or neural foraminal stenosis.    C5-6:  Bilateral uncovertebral and facet hypertrophy, right greater  the left. Moderate bilateral neural foraminal narrowing. No spinal  canal stenosis.    C6-7:  Disc osteophyte complex. Bilateral uncovertebral and facet  hypertrophy. Mild-to-moderate bilateral neural foraminal narrowing. No  spinal canal stenosis.    C7-T1:  No spinal canal or neural foraminal stenosis.       Impression    Impression:   1. Postsurgical changes of anterior cervical discectomy and fusion  C5-6 and C7-T2 with associated postoperative prevertebral soft tissue  edema and emphysema. Consider MRI for evaluation of soft tissue  abscess and osteomyelitis.  2. Multilevel cervical spondylosis as described above.    I have personally reviewed the examination and initial interpretation  and I agree with the findings.    HOME ROSE MD         SYSTEM ID:  [id number]   XR Surgery DEVORA L/T 5 Min Fluoro w Stills    Narrative    This exam was marked as non-reportable because  it will not be read by a   radiologist or a [place] non-radiologist provider.         XR Cervical Spine 2/3 Views    Narrative    XR CERVICAL SPINE 2/3 VWS 2022 8:29 AM    Provided History: status post Anterior and posterior cervical fusion   ICD-10:    Comparison: MRI cervical spine 2/10/2022. CT cervical spine 2/10/2022.    Findings: AP and lateral views of the cervical spine were obtained.  Posterior fusion instrumentation from C5-T2. Intervening lower  cervical laminectomies. Interbody spacers at C5-6, C7-T1, and T1-2.  Straightening of cervical lordosis. Focal kyphosis at C7-T1 and grade  1 anterolisthesis of C7 on T1. Moderate prevertebral edema. Multilevel  degenerative changes and cutting endplate osteophytosis, uncinate  hypertrophy, and facet hypertrophy.    No mass in the visualized lung apices.      Impression    Impression:  1. New posterior fusion instrumentation from C5-T2.  2. Persistent changes of ACDF at C5-6, C7-T1, and T1-2.  3. Persistent prevertebral edema.         DIANDRA BROWN MD         SYSTEM ID:  [id number]   Echo Complete     Value    LVEF  55-60%    Narrative    [id number]  [id number]  [id number]  [id number]^[place]^CARLO^LANA     [facility],[place]  Echocardiography Laboratory  [address] [place] [address]     Name: SONYA ARAUJO  MRN: [id number]  : 1970  Study Date: 2022 02:35 PM  Age: 51 yrs  Gender: Male  Patient [facility]: [facility]  Reason For Study: Endocarditis  Ordering Physician: CARLO GOFF  Performed By: Rachel Dunbar RDCS     BSA: 1.9 m2  Height: 69 in  Weight: 174 lb  HR: 109  BP: 135/80 mmHg  ______________________________________________________________________________  Procedure  Complete Portable Echo Adult. Contrast Optison. Patient was given 5 ml mixture  of 3 ml Optison and 6 ml saline. 4 ml wasted.  ______________________________________________________________________________  Interpretation  Summary  Global and regional left ventricular function is normal with an EF of 55-60%.  The right ventricle is normal in function and size.  No significant valvular abnormality.  No pericardial effusion is present.  IVC diameter <2.1 cm collapsing >50% with sniff suggests a normal RA pressure  of 3 mmHg.  There is no prior study for direct comparison.  ______________________________________________________________________________  Left Ventricle  Left ventricular size is normal. Global and regional left ventricular function  is normal with an EF of 55-60%. Thickening of the anterobasal septum is  present. Diastolic function not assessed due to tachycardia. No regional wall  motion abnormalities are seen.     Right Ventricle  The right ventricle is normal size. Global right ventricular function is  normal.     Atria  Both atria appear normal.     Mitral Valve  The mitral valve is normal. Trace mitral insufficiency is present.     Tricuspid Valve  The tricuspid valve is normal. Trace tricuspid insufficiency is present. The  peak velocity of the tricuspid regurgitant jet is not obtainable.     Pulmonic Valve  On Doppler interrogation, there is no significant stenosis or regurgitation.  The valve leaflets are not well visualized.     Vessels  The aorta root is normal. The thoracic aorta is normal. IVC diameter <2.1 cm  collapsing >50% with sniff suggests a normal RA pressure of 3 mmHg.     Pericardium  No pericardial effusion is present.     Compared to Previous Study  There is no prior study for direct comparison.     ______________________________________________________________________________  MMode/2D Measurements & Calculations  IVSd: 1.3 cm  LVIDd: 3.9 cm  LVIDs: 3.1 cm  LVPWd: 0.82 cm  FS: 21.3 %     LV mass(C)d: 132.4 grams  LV mass(C)dI: 68.0 grams/m2  Ao root diam: 2.9 cm  asc Aorta Diam: 2.7 cm  LVOT diam: 2.0 cm  LVOT area: 3.1 cm2  LA Volume (BP): 24.0 ml  LA Volume Index (BP): 12.3 ml/m2  RWT: 0.42      Doppler Measurements & Calculations  PA acc time: 0.08 sec     ______________________________________________________________________________  Report approved by: Farhan Stevenson 2022 03:30 PM         Transesophageal Echocardiogram     Value    LVEF  60-65%    Narrative    [id number]  [facility]  [id number]  [id number]^[place]^CARLO^LANA     [facility],[place]  Echocardiography Laboratory  [address] [place] [id number]     Name: SONYA ARAUJO  MRN: [id number]  : 1970  Study Date: 02/15/2022 01:42 PM  Age: 51 yrs  Gender: Male  Patient Location: [facility]  Reason For Study: 2nd degree AV Block, Endocarditis  Ordering Physician: CARLO GOFF  Performed By: Son Vale     Attestation  I was present during PREM probe placement by the fellow, Son Vale. I  personally viewed the imaging and agree with the interpretation and report as  documented by the fellow.  ______________________________________________________________________________  Interpretation Summary  Normal biventricular function.  No valvular abnormalities or vegetations noted.  No pericardial effusion present.  ______________________________________________________________________________  Procedure  Intraoperative Transesophageal Echocardiogram with color and spectral Doppler  performed. 3D image acquisition, reconstruction, and real-time interpretation  was performed. Procedure location Operating Room. Informed consent for  Transesophegeal echo obtained. PREM Probe #63 was used during the procedure.  Sedation, endotracheal intubation, and mechanical ventilation were initiated  prior to the PREM and were monitored by anesthesia. The Transducer was inserted  without difficulty . Complications None. The patient tolerated the procedure  well. The patient's rhythm is sinus tachycardia. Adequate quality two-  dimensional was performed and interpreted. Adequate quality color  and spectral  Doppler were performed and interpreted.     Left Ventricle  Left ventricular size is normal. Global and regional left ventricular function  is normal with an EF of 60-65%.     Right Ventricle  The right ventricle is normal size. Global right ventricular function is  normal.     Atria  Both atria appear normal. The left atrial appendage is normal. It is free of  spontaneous echo contrast and thrombus. No left atrial mass or thrombus  visualized. The atrial septum is intact as assessed by color Doppler .     Mitral Valve  The mitral valve is normal. Trace mitral insufficiency is present.     Aortic Valve  Aortic valve is normal in structure and function. The aortic valve is  tricuspid. No aortic regurgitation is present.     Tricuspid Valve  The tricuspid valve is normal. Trace tricuspid insufficiency is present.     Pulmonic Valve  The pulmonic valve is normal.     Vessels  The aorta root is normal.     Pericardium  No pericardial effusion is present.     Miscellaneous  Transgastric imaging was not performed.     ______________________________________________________________________________  Report approved by: Catherine Meraz 02/15/2022 03:20 PM     ______________________________________________________________________________

## 2022-06-08 NOTE — LETTER
6/8/2022      RE: Dave Calero  [address]  [facility] [address]       Heron is a 51 year old who is being evaluated via a billable video visit.      How would you like to obtain your AVS? MyChart  If the video visit is dropped, the invitation should be resent by: Text to cell phone: [phone]  Will anyone else be joining your video visit? No     Dave is a 51 year old who is being evaluated via a billable video visit.      Video Start Time: Approx 432 pm  Video-Visit Details    Type of service:  Video Visit    Video End Time: Approx 4:45 pm  Originating Location (pt. Location):Home    Distant Location (provider location):  [facility] IN[facility] [place]     Platform used for Video Visit: Dialogfeed      [facility]  Infectious Disease Clinic Note: Follow up     Patient:  Dave Calero, Date of birth 1970, Medical record number [id number]  Date of Visit: 06/08/22         Assessment and Recommendations:       Assessment:  ID Problem List:  1. Cervical discitis with epidural abscess  -s/p ACDF (2/9) and posterior instrmentation (2/10)  -Intraop cultures with MSSA, Cutibacterium  2. Granulicatella and MSSA bacteremia        Discussion:  52yo M initially admitted with cervical discitis and epidural abscess, s/p ACDF (2/9) and posterior instrumentation (2/10) with intraoperative cultures growing MSSA and Cutibacterium. Also had bacteremia (Granulicatella and MSSA).  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    First seen by me at ID clinic follow up 3/10/22. CRPS had trended down to normal. Pt with no obviouss concerns from an infection standpoint but some headache and difficulty swallowing  after cracking his neck in bed one night. Since these are new since postop period reached out to Ortho to check on these earlier than planned follow up.      6 weeks were to be completed 3/25, had a CRP elevation to 12 3/24, so repeated 3/28, and stopped IV Vanc 3/29 after this resulted normal. 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  Anticipated minimum 3 months of Doxycyline+Rifampin, final duration to be decided at 3 month follow up today.    Other than persistent difficulty swallowing he is doing well from infection standpoint. Had speech eval and video swallow, now waiting to hear back from GI regarding esophagogram. Having a lot of acid reflux as well, and regurgitation    There is no clear evidence for duration of chronic suppression, most people get 6 months to a year. Discussed with pt today, he is amenable to continuing another 3 months minimum. I do wonder if Doxy is contributing to his GERD, so will re-evaluate earlier if no other cause found for GERD after GI eval. Pt reports this was only last one month and he was taking Doxy before that so he does not feel it is related likely.     Recommendations:  Started Pantoprazole tablet daily to see if it helps reflux, until he can be seen by GI. This is typically not recommended >12 weeks so advised pt against long term use without GI supervision.   Continue Doxycyline and Rifampin for another 3 months - we will e-evaluate as needed if Doxycyline could be contributing to GERD symptoms  Follow up with me in 3 months  Pt is anxious to get in to see GI, very troubled by dysphagia, I have sent a message to GI RN to see if  they are able to schedule him earlier.   Labs to monitor on antibitoics at nearby [place] in next 1-2 weeks: CBC, CMP, CRP      Nazario Tapia   of Medicine  Division of Infectious Diseases         Interval events     Neck issues much better but  "swallowing still giving a lot of trouble. No weight loss. Unable to eat without laying down sometimes. Had speech eval and video swallow, now waiting to hear back from GI regarding esophagogram. Having a lot of acid reflux as well, and regurgitation    Previous concern from pt and wife last visit about  'bad kidney function' reported per pharmacy was clarified via epic message with JEN CAREY RPH Jackelier Ang 3/10/22: \"I spoke to Dave Calero on 3/4/22 and reviewed his labs from 3/3/22.  As you stated his Scr of 0.72 on 3/3 was normal. His SCr on 2/17 was 0.58, on 2/24 0.68 and on 3/4/22 was 0.72. My documentation on 3/4 states that I encouraged patient to drink enough fluids to keep kidneys hydrated and that urine color should be lemonade like, which is a general advise we give to patients on IV vancomycin to avoid dehydration.\" - presume this was just misunderstood as bad kidney function at the time by pt.         No past medical history on file.    Past Surgical History:   Procedure Laterality Date     OPTICAL TRACKING SYSTEM FUSION POSTERIOR CERVICAL THREE + LEVELS N/A 2/12/2022    Procedure: Posterior instrumented spinal fusion cervical 5 to thoracic 2, with laminectomies at Cervical 5, Cervical 6, Smithe Tucker Oseotomy Cervical 5-6, Cervical 6-7 ;  Surgeon: Kulwinder Calixto MD;  Location:  OR     OPTICAL TRACKING SYSTEM FUSION POSTERIOR SPINE LUMBAR N/A 2/9/2022    Procedure: Anterior cervical diskectomy and fusion, cervical 5 to thoracic 1 (3 levels); right or anterior iliac crest autograft harvest. ;  Surgeon: Kulwinder Calixto MD;  Location: [facility] OR     TRANSESOPHAGEAL ECHOCARDIOGRAM INTRAOPERATIVE N/A 2/15/2022    Procedure: ECHOCARDIOGRAM, TRANSESOPHAGEAL (PREM), INTRAOPERATIVE;  Surgeon: GENERIC ANESTHESIA PROVIDER;  Location: [facility] OR       No family history on file.    Social History     Social History Narrative     Not on file     Social History     Tobacco Use     Smoking " status: Former Smoker     Smokeless tobacco: Never Used       Immunization History   Administered Date(s) Administered     COVID-19,PF,Moderna 04/14/2021, 05/12/2021       Patient Active Problem List   Diagnosis     Discitis of cervical region     Epidural abscess     Hard to intubate     Cervical spondylosis with myelopathy     Encounter for screening laboratory testing for severe acute respiratory syndrome coronavirus 2 (SARS-CoV-2)     Neck pain     Arthrodesis status       Current Outpatient Medications   Medication Sig     acetaminophen (TYLENOL) 500 MG tablet Take 1-2 tablets (500-1,000 mg) by mouth every 6 hours as needed for mild pain     amphetamine-dextroamphetamine (ADDERALL XR) 30 MG 24 hr capsule Take 30 mg by mouth daily     baclofen (LIORESAL) 10 MG tablet Take 5-10 mg by mouth 3 times daily as needed for muscle spasms     camphor-menthol-methyl sal 4-10-30 % CREA Externally apply topically 3 times daily as needed (pain)     cetirizine (ZYRTEC) 10 MG tablet Take 10 mg by mouth daily     diazepam (VALIUM) 5 MG tablet Take 1 tablet (5 mg) by mouth every 6 hours as needed for anxiety or muscle spasms     doxycycline hyclate (VIBRA-TABS) 100 MG tablet Take 1 tablet (100 mg) by mouth 2 times daily     FLUoxetine (PROZAC) 10 MG capsule Take 10 mg by mouth daily Take with the 20mg capsule for a total daily dose of 30mg     FLUoxetine (PROZAC) 20 MG capsule Take 20 mg by mouth daily Take with the 10mg capsule for a total daily dose of 30mg     gabapentin (NEURONTIN) 300 MG capsule Take 300mg by mouth at bedtime for 2 days, then increase to 600mg at bedtime daily     hydrOXYzine (ATARAX) 50 MG tablet Take 1 tablet (50 mg) by mouth every 6 hours as needed for itching or other (pain adjunct)     ibuprofen (ADVIL/MOTRIN) 200 MG tablet Take 200 mg by mouth every 4 hours as needed for mild pain     methocarbamol (ROBAXIN) 750 MG tablet Take 1 tablet (750 mg) by mouth 4 times daily as needed for muscle spasms      oxyCODONE (ROXICODONE) 5 MG tablet Take 1 tablet (5 mg) by mouth every 4 hours     pantoprazole (PROTONIX) 40 MG EC tablet Take 1 tablet (40 mg) by mouth daily     rifampin (RIFADIN) 300 MG capsule Take 2 capsules (600 mg) by mouth daily     senna-docusate (SENOKOT-S/PERICOLACE) 8.6-50 MG tablet Take 1 tablet by mouth 2 times daily     VITAMIN D PO Take 1 tablet by mouth daily     polyethylene glycol (MIRALAX) 17 g packet Take 17 g by mouth daily (Patient not taking: No sig reported)     No current facility-administered medications for this visit.       No Known Allergies           Physical Exam:     There were no vitals taken for this visit.    Limited video Exam:  GENERAL:  well-developed, well-nourished, alert, oriented, in no acute distress.  HEENT:  Head is normocephalic, atraumatic, collar now off  EYES:  Eyes have anicteric sclerae.    LUNGS:  Breathing comfortably  SKIN:  No acute rashes.    NEUROLOGIC:  Grossly nonfocal.         Laboratory Data:     Inflammatory Markers    Recent Labs   Lab Test 03/28/22  1315 03/24/22  1830 03/17/22  1830 03/10/22  1815 03/03/22  1810 02/24/22  1900 02/17/22  0845 02/09/22  1438   CRP 4.6 12.0* 3.3 4.9 6.1 14.0* 53.2* 74.0*       Metabolic Studies       Recent Labs   Lab Test 03/24/22  1830 03/17/22  1830 03/10/22  1815 03/03/22  1810 02/24/22  1900 02/17/22  0845 02/15/22  1211 02/14/22  1803 02/13/22  0634 02/12/22  1153    139 138 140 139 139  --   --    < > 138   POTASSIUM 3.7 4.0 4.3 4.2 4.2 3.8  --   --    < > 3.8   CHLORIDE 107 107 105 107 106 104  --   --    < >  --    CO2 28 29 29 28 28 30  --   --    < >  --    ANIONGAP 5 3 4 5 5 5  --   --    < >  --    BUN 13 12 15 10 13 10  --   --    < >  --    CR 0.86 0.77 0.76 0.72 0.68 0.58*  --   --    < >  --    GFRESTIMATED >90 >90 >90 >90 >90 >90  --   --    < >  --    GLC 94 90 78 98 122* 101*   < >  --    < > 117*   VIANEY 8.9 8.9 8.8 9.0 8.6 9.5  --   --    < >  --    LACT  --   --   --   --   --   --   --  1.3   --  0.8    < > = values in this interval not displayed.       Hematology Studies      Recent Labs   Lab Test 03/24/22  1830 03/17/22  1830 03/10/22  1815 03/03/22  1810 02/24/22  1900 02/17/22  0845   WBC 7.4 7.0 6.0 7.9 9.2 7.6   HGB 11.9* 12.6* 12.0* 11.3* 10.4* 9.1*   HCT 35.8* 38.8* 36.8* 36.0* 33.2* 28.5*    314 353 476* 515* 603*       Clotting Studies    Recent Labs   Lab Test 02/09/22  1438   INR 0.98       Urine Studies     Recent Labs   Lab Test 02/10/22  1918   URINEPH 5.5   NITRITE Negative   LEUKEST Negative   WBCU 1       Imaging:  Results for orders placed or performed during the hospital encounter of 02/09/22   XR Surgery DEVORA Fluoro L/T 5 Min w Stills    Narrative    Exam: XR SURGERY DEVORA FLUORO LESS THAN 5 MIN W STILLS, 2/9/2022 11:33  PM    Provided History: Anterior cervical discectomy and fusion, possible  anterior plate fixation C5-T1, right or left anterior iliac crest  autograft harvest  ICD-10:    Comparison: None.    Technique: Intraoperative imaging.    Findings:    A single lateral intraoperative image from 11:13 PM 2/9/2022 shows  linear surgical probe tip projecting over the anterior aspect of the  inferior endplate of C6.    Additional surgical clamp projects over the C6-7 intervertebral disc  space. Endotracheal tube is partially imaged.       Impression    Impression: Instrumentation projecting toward anterior aspect of the  inferior endplate of C6.    The above indicated surgical level was reported to operating room  personnel, specifically Dr Calixto, via telephone by Dr Andujar  on 2/9/2022 11:33 PM.    I have personally reviewed the examination and initial interpretation  and I agree with the findings.    LAUREN MEAD MD         SYSTEM ID:  [id number]   MR Cervical Spine w/o & w Contrast    Narrative    MR CERVICAL SPINE W/O & W CONTRAST 2/10/2022 3:00 PM    Provided History: Epidural abscess; s/p acdf  MR CERVICAL SPINE W/O & W CONTRAST 2/10/2022 3:00  PM    Provided History: Epidural abscess; s/p acdf    Comparison: Same day CT cervical spine    Technique: Sagittal T1-weighted, sagittal T2-weighted, sagittal  diffusion weighted, axial T2-weighted images of the cervical spine  were obtained without intravenous contrast. Following intravenous  administration of gadolinium, axial and sagittal T1-weighted images  with fat saturation were also obtained.    Contrast: 7.5ML iv Gadavist    Findings:  Minimal anterolisthesis C2-3.    Postsurgical changes of anterior fusion with interbody devices at  C5-6, C7-T1, and T1-2.      Bone marrow edema and associated enhancement involving the C6-T2  vertebral bodies. Bone marrow edema extends into T1 and T2 posterior  elements    Extensive retropharyngeal/prevertebral soft tissue edema and  enhancement extending from C5 down to T3 level. Anterior and posterior  epidural T2 hyperintense signal with associated enhancement extending  from C5-6 down to T2-3. Edema and enhancement extends into bilateral  neural foramina from C6 to T2. Associated severe spinal canal stenosis  at C6-7 at C7-T1. Edema and associated enhancement in the interspinous  region at C7-T1 and T1-T2 levels. Moderate spinal canal stenosis C5-6.  No definite epidural fluid collection.    Multilevel disc height narrowing and disc desiccation. On a level by  level basis;    C2-3: Bilateral uncovertebral spurring and facet hypertrophy resulting  in mild bilateral neural stenosis. No spinal canal stenosis.    C3-4: Disc osteophyte complex and bilateral uncinate spurring,  eccentric to the left. Left greater than right facet hypertrophy.  Severe left neural foraminal stenosis. Mild right neural foraminal  stenosis. No spinal canal stenosis.    C4-5: Uncovertebral spurring in the right greater than left facet  hypertrophy. Mild to moderate right and mild left neural foraminal  stenosis. No spinal canal stenosis.    C5-6: Fusion level. Severe bilateral neural foraminal  and moderate  spinal canal stenosis secondary to uncovertebral spurring and  superimposed epidural inflammatory findings.    C6-7: Disc osteophyte complex and bilateral uncinate spurring.  Bilateral facet hypertrophy. Epidural inflammatory/infectious findings  contribute to severe bilateral neural foraminal and severe spinal  canal stenosis.     C7-T1: Fusion level. Epidural inflammatory/infectious findings  contribute to severe bilateral neural foraminal and severe spinal  canal stenosis      Impression    Impression:   1. Early postsurgical changes of instrumented posterior spinal fusion  with interbody devices at C5-C6, C7-T1 and T1-T2.   2. Bone marrow edema and associated enhancement involving the C6-T2  vertebrae, consistent with osteomyelitis. Epidural  phlegmon extending  from phlegmon C5-6 down to T2-3. There are several questionable tiny  fluid pockets pockets, for example in the anterior epidural space at  C6 level. However it is mostly phlegmon. Edema and enhancement extends  into bilateral neural foramina from C6 to T2. Associated severe spinal  canal stenosis at C6-7 at C7-T1 and moderate spinal canal stenosis  C5-6.  3. Extensive retropharyngeal/prevertebral soft tissue edema and  enhancement extending from C5 down to T3 level. This is combination of  postsurgical changes and inflammatory/infectious findings.     I have personally reviewed the examination and initial interpretation  and I agree with the findings.    ANDRE ESTRADA MD         SYSTEM ID:  [id number]   CT Cervical Spine w/o Contrast    Narrative    CT CERVICAL SPINE W/O CONTRAST 2/10/2022 12:06 PM    Provided History: Epidural abscess; s/p anterior fusion; to OR Friday  or Saturday for posterior approach    Comparison: None available    Technique: Using multidetector thin collimation helical acquisition  technique, axial, coronal and sagittal CT images through the cervical  spine were obtained without intravenous contrast.      Findings:  Postsurgical changes of anterior cervical discectomy and fusion with  interbody devices at C5-6, C7-T1 and T1-2. Associated postoperative  prevertebral soft tissue edema and emphysema. The cervical vertebrae  are normally aligned. Trace anterolisthesis of C3 on C4. Straightening  of the expected cervical lordosis. No acute fracture or subluxation.  Heterogeneous sclerotic changes of the C5 and C6 vertebral bodies.  Multilevel degenerative changes with osteophytic and uncovertebral  spurring, facet arthropathy, and moderate disc space narrowing at C6-7  The findings on a level by level basis are as follows:    C2-3:  Bilateral uncovertebral and facet hypertrophy, left greater  than right. Mild left neuroforaminal narrowing. No right  neuroforaminal or spinal canal stenosis.    C3-4:  Disc osteophyte complex. Bilateral uncovertebral and facet  hypertrophy, left greater than right. Mild to moderate left  neuroforaminal narrowing. No right neuroforaminal or spinal canal  stenosis    C4-5:  Bilateral uncovertebral and facet hypertrophy. No spinal canal  or neural foraminal stenosis.    C5-6:  Bilateral uncovertebral and facet hypertrophy, right greater  the left. Moderate bilateral neural foraminal narrowing. No spinal  canal stenosis.    C6-7:  Disc osteophyte complex. Bilateral uncovertebral and facet  hypertrophy. Mild-to-moderate bilateral neural foraminal narrowing. No  spinal canal stenosis.    C7-T1:  No spinal canal or neural foraminal stenosis.       Impression    Impression:   1. Postsurgical changes of anterior cervical discectomy and fusion  C5-6 and C7-T2 with associated postoperative prevertebral soft tissue  edema and emphysema. Consider MRI for evaluation of soft tissue  abscess and osteomyelitis.  2. Multilevel cervical spondylosis as described above.    I have personally reviewed the examination and initial interpretation  and I agree with the findings.    HOME ROSE MD    SYSTEM ID:   [id number]   XR Surgery DEVORA L/T 5 Min Fluoro w Stills    Narrative    This exam was marked as non-reportable because it will not be read by a   radiologist or a [place] non-radiologist provider.       XR Cervical Spine 2/3 Views    Narrative    XR CERVICAL SPINE 2/3 VWS 2022 8:29 AM    Provided History: status post Anterior and posterior cervical fusion   ICD-10:    Comparison: MRI cervical spine 2/10/2022. CT cervical spine 2/10/2022.    Findings: AP and lateral views of the cervical spine were obtained.  Posterior fusion instrumentation from C5-T2. Intervening lower  cervical laminectomies. Interbody spacers at C5-6, C7-T1, and T1-2.  Straightening of cervical lordosis. Focal kyphosis at C7-T1 and grade  1 anterolisthesis of C7 on T1. Moderate prevertebral edema. Multilevel  degenerative changes and cutting endplate osteophytosis, uncinate  hypertrophy, and facet hypertrophy.    No mass in the visualized lung apices.      Impression    Impression:  1. New posterior fusion instrumentation from C5-T2.  2. Persistent changes of ACDF at C5-6, C7-T1, and T1-2.  3. Persistent prevertebral edema.         DIANDRA BROWN MD         SYSTEM ID:  [id number]   Echo Complete     Value    LVEF  55-60%    Narrative    [id number]  [id number]  [id number]  [id number]^[place]^CARLO^LANA     [facility],[place]  Echocardiography Laboratory  [address] [place] [id number]     Name: SONYA ARAUJO  MRN: [id number]  : 1970  Study Date: 2022 02:35 PM  Age: 51 yrs  Gender: Male  Patient Location: [facility]  Reason For Study: Endocarditis  Ordering Physician: CARLO GOFF  Performed By: Rachel Dunbar RDCS     BSA: 1.9 m2  Height: 69 in  Weight: 174 lb  HR: 109  BP: 135/80 mmHg  ______________________________________________________________________________  Procedure  Complete Portable Echo Adult. Contrast Optison. Patient was given 5 ml mixture  of 3 ml Optison and 6 ml  saline. 4 ml wasted.  ______________________________________________________________________________  Interpretation Summary  Global and regional left ventricular function is normal with an EF of 55-60%.  The right ventricle is normal in function and size.  No significant valvular abnormality.  No pericardial effusion is present.  IVC diameter <2.1 cm collapsing >50% with sniff suggests a normal RA pressure  of 3 mmHg.  There is no prior study for direct comparison.  ______________________________________________________________________________  Left Ventricle  Left ventricular size is normal. Global and regional left ventricular function  is normal with an EF of 55-60%. Thickening of the anterobasal septum is  present. Diastolic function not assessed due to tachycardia. No regional wall  motion abnormalities are seen.     Right Ventricle  The right ventricle is normal size. Global right ventricular function is  normal.     Atria  Both atria appear normal.     Mitral Valve  The mitral valve is normal. Trace mitral insufficiency is present.     Tricuspid Valve  The tricuspid valve is normal. Trace tricuspid insufficiency is present. The  peak velocity of the tricuspid regurgitant jet is not obtainable.     Pulmonic Valve  On Doppler interrogation, there is no significant stenosis or regurgitation.  The valve leaflets are not well visualized.     Vessels  The aorta root is normal. The thoracic aorta is normal. IVC diameter <2.1 cm  collapsing >50% with sniff suggests a normal RA pressure of 3 mmHg.     Pericardium  No pericardial effusion is present.     Compared to Previous Study  There is no prior study for direct comparison.   ______________________________________________________________________________  MMode/2D Measurements & Calculations  IVSd: 1.3 cm  LVIDd: 3.9 cm  LVIDs: 3.1 cm  LVPWd: 0.82 cm  FS: 21.3 %     LV mass(C)d: 132.4 grams  LV mass(C)dI: 68.0 grams/m2  Ao root diam: 2.9 cm  asc Aorta Diam: 2.7  cm  LVOT diam: 2.0 cm  LVOT area: 3.1 cm2  LA Volume (BP): 24.0 ml  LA Volume Index (BP): 12.3 ml/m2  RWT: 0.42     Doppler Measurements & Calculations  PA acc time: 0.08 sec   ______________________________________________________________________________  Report approved by: Farhan Stevenson 2022 03:30 PM       Transesophageal Echocardiogram     Value    LVEF  60-65%    Narrative    [id number]  [facility]  [id number]  [id number]^[place]^CARLO^LANA     [facility],[place]  Echocardiography Laboratory  [address] [place] [id number]     Name: SONYA ARAUJO  MRN: [id number]  : 1970  Study Date: 02/15/2022 01:42 PM  Age: 51 yrs  Gender: Male  Patient Location: [facility]  Reason For Study: 2nd degree AV Block, Endocarditis  Ordering Physician: CARLO GOFF  Performed By: Son Vale     Attestation  I was present during PREM probe placement by the fellow, Son Vale. I  personally viewed the imaging and agree with the interpretation and report as  documented by the fellow.  ______________________________________________________________________________  Interpretation Summary  Normal biventricular function.  No valvular abnormalities or vegetations noted.  No pericardial effusion present.  ______________________________________________________________________________  Procedure  Intraoperative Transesophageal Echocardiogram with color and spectral Doppler  performed. 3D image acquisition, reconstruction, and real-time interpretation  was performed. Procedure location Operating Room. Informed consent for  Transesophegeal echo obtained. PREM Probe #[id number] was used during the procedure.  Sedation, endotracheal intubation, and mechanical ventilation were initiated  prior to the PREM and were monitored by anesthesia. The Transducer was inserted  without difficulty . Complications None. The patient tolerated the procedure  well. The patient's rhythm is  sinus tachycardia. Adequate quality two-  dimensional was performed and interpreted. Adequate quality color and spectral  Doppler were performed and interpreted.     Left Ventricle  Left ventricular size is normal. Global and regional left ventricular function  is normal with an EF of 60-65%.     Right Ventricle  The right ventricle is normal size. Global right ventricular function is  normal.     Atria  Both atria appear normal. The left atrial appendage is normal. It is free of  spontaneous echo contrast and thrombus. No left atrial mass or thrombus  visualized. The atrial septum is intact as assessed by color Doppler .     Mitral Valve  The mitral valve is normal. Trace mitral insufficiency is present.     Aortic Valve  Aortic valve is normal in structure and function. The aortic valve is  tricuspid. No aortic regurgitation is present.     Tricuspid Valve  The tricuspid valve is normal. Trace tricuspid insufficiency is present.     Pulmonic Valve  The pulmonic valve is normal.     Vessels  The aorta root is normal.     Pericardium  No pericardial effusion is present.     Miscellaneous  Transgastric imaging was not performed.     ______________________________________________________________________________  Report approved by: Catherine Meraz 02/15/2022 03:20 PM     ______________________________________________________________________________          Nazario Tapia MD

## 2022-06-14 ENCOUNTER — LAB (OUTPATIENT)
Dept: LAB | Facility: HOSPITAL | Age: 52
End: 2022-06-14
Payer: COMMERCIAL

## 2022-06-14 DIAGNOSIS — Z51.81 THERAPEUTIC DRUG MONITORING: ICD-10-CM

## 2022-06-14 LAB
ALBUMIN SERPL-MCNC: 3.8 G/DL (ref 3.5–5)
ALP SERPL-CCNC: 109 U/L (ref 45–120)
ALT SERPL W P-5'-P-CCNC: 22 U/L (ref 0–45)
ANION GAP SERPL CALCULATED.3IONS-SCNC: 4 MMOL/L (ref 5–18)
AST SERPL W P-5'-P-CCNC: 13 U/L (ref 0–40)
BASOPHILS # BLD AUTO: 0.1 10E3/UL (ref 0–0.2)
BASOPHILS NFR BLD AUTO: 2 %
BILIRUB SERPL-MCNC: 0.4 MG/DL (ref 0–1)
BUN SERPL-MCNC: 19 MG/DL (ref 8–22)
C REACTIVE PROTEIN LHE: 0.2 MG/DL (ref 0–0.8)
CALCIUM SERPL-MCNC: 9.2 MG/DL (ref 8.5–10.5)
CHLORIDE BLD-SCNC: 105 MMOL/L (ref 98–107)
CO2 SERPL-SCNC: 30 MMOL/L (ref 22–31)
CREAT SERPL-MCNC: 0.81 MG/DL (ref 0.7–1.3)
EOSINOPHIL # BLD AUTO: 0.4 10E3/UL (ref 0–0.7)
EOSINOPHIL NFR BLD AUTO: 5 %
ERYTHROCYTE [DISTWIDTH] IN BLOOD BY AUTOMATED COUNT: 13.3 % (ref 10–15)
GFR SERPL CREATININE-BSD FRML MDRD: >90 ML/MIN/1.73M2
GLUCOSE BLD-MCNC: 110 MG/DL (ref 70–125)
HCT VFR BLD AUTO: 44.8 % (ref 40–53)
HGB BLD-MCNC: 15.3 G/DL (ref 13.3–17.7)
IMM GRANULOCYTES # BLD: 0 10E3/UL
IMM GRANULOCYTES NFR BLD: 1 %
LYMPHOCYTES # BLD AUTO: 2.4 10E3/UL (ref 0.8–5.3)
LYMPHOCYTES NFR BLD AUTO: 35 %
MCH RBC QN AUTO: 27.9 PG (ref 26.5–33)
MCHC RBC AUTO-ENTMCNC: 34.2 G/DL (ref 31.5–36.5)
MCV RBC AUTO: 82 FL (ref 78–100)
MONOCYTES # BLD AUTO: 0.6 10E3/UL (ref 0–1.3)
MONOCYTES NFR BLD AUTO: 9 %
NEUTROPHILS # BLD AUTO: 3.4 10E3/UL (ref 1.6–8.3)
NEUTROPHILS NFR BLD AUTO: 48 %
NRBC # BLD AUTO: 0 10E3/UL
NRBC BLD AUTO-RTO: 0 /100
PLATELET # BLD AUTO: 269 10E3/UL (ref 150–450)
POTASSIUM BLD-SCNC: 4 MMOL/L (ref 3.5–5)
PROT SERPL-MCNC: 7 G/DL (ref 6–8)
RBC # BLD AUTO: 5.49 10E6/UL (ref 4.4–5.9)
SODIUM SERPL-SCNC: 139 MMOL/L (ref 136–145)
WBC # BLD AUTO: 6.9 10E3/UL (ref 4–11)

## 2022-06-14 PROCEDURE — 80053 COMPREHEN METABOLIC PANEL: CPT

## 2022-06-14 PROCEDURE — 36415 COLL VENOUS BLD VENIPUNCTURE: CPT

## 2022-06-14 PROCEDURE — 85025 COMPLETE CBC W/AUTO DIFF WBC: CPT

## 2022-06-14 PROCEDURE — 86140 C-REACTIVE PROTEIN: CPT

## 2022-06-17 NOTE — TELEPHONE ENCOUNTER
[facility] Call Center    Phone Message    May a detailed message be left on voicemail: yes     Reason for Call: Other: Patient still has not heard back from clinic or been scheduled.  Encounter sent on 6/8 - please reach out asap.      Action Taken: Message routed to:  [facility] GI    Travel Screening: Not Applicable

## 2022-06-23 DIAGNOSIS — M47.12 CERVICAL SPONDYLOSIS WITH MYELOPATHY: ICD-10-CM

## 2022-06-23 RX ORDER — RIFAMPIN 300 MG/1
CAPSULE ORAL
Qty: 180 CAPSULE | Refills: 0 | Status: SHIPPED | OUTPATIENT
Start: 2022-06-23 | End: 2022-10-11

## 2022-07-08 PROBLEM — Z98.1 ARTHRODESIS STATUS: Status: RESOLVED | Noted: 2022-03-22 | Resolved: 2022-07-08

## 2022-07-08 PROBLEM — M54.2 NECK PAIN: Status: RESOLVED | Noted: 2022-03-22 | Resolved: 2022-07-08

## 2022-07-08 NOTE — PROGRESS NOTES
Discharge Note    Progress reporting period is from initial evaluation date (please see noted date below) to May 4, 2022.  Linked Episodes   Type: Episode: Status: Noted: Resolved: Last update: Updated by:   PHYSICAL THERAPY s/p C spine fusion 3/22/22 Active 3/22/2022  5/4/2022  8:39 AM Roxane Calixto, PT      Comments:       Dave failed to follow up and current status is unknown.  Please see information below for last relevant information on current status.  Patient seen for 8 visits.    SUBJECTIVE  Subjective changes noted by patient:  Pt notes that returned to work this week, starting wtih half days. Out of collar. Surgery on wrist on May 12 so will be off work.  .  Current pain level is 0/10.     Previous pain level was  2/10.   Changes in function:  Yes (See Goal flowsheet attached for changes in current functional level)  Adverse reaction to treatment or activity: None    OBJECTIVE  Changes noted in objective findings: Cervical rotation to R 45*, L 35. SB R 15, L 10.     ASSESSMENT/PLAN  Diagnosis: Neck pain   Updated problem list and treatment plan:   Decreased ROM/flexibility - HEP  Decreased strength - HEP  Impaired muscle performance - HEP  STG/LTGs have been met or progress has been made towards goals:  Yes, please see goal flowsheet for most current information  Assessment of Progress: current status is unknown.    Last current status: Pt is progressing well   Self Management Plans:  HEP  I have re-evaluated this patient and find that the nature, scope, duration and intensity of the therapy is appropriate for the medical condition of the patient.  Dave continues to require the following intervention to meet STG and LTG's:  HEP.    Recommendations:  Discharge with current home program.  Patient to follow up with MD as needed.    Please refer to the daily flowsheet for treatment today, total treatment time and time spent performing 1:1 timed codes.

## 2022-07-13 NOTE — TELEPHONE ENCOUNTER
Referral reviewed by Alanna Hodgson to add pt to Dr. Hook's schedule on 7/19/22 at 8am.    Spoke with Heron, Heron agree to the appointment.

## 2022-07-13 NOTE — TELEPHONE ENCOUNTER
[facility] Call Center    Phone Message    May a detailed message be left on voicemail: yes     Reason for Call: The patient called and stated that he still has not received a call regarding scheduling for this appt.  This encounter was sent on 6/8.  Please reach out to the patient ASAP.    Action Taken: Message routed to:  [facility] Adult Gastro    Travel Screening: Not Applicable

## 2022-07-14 DIAGNOSIS — M46.42 DISCITIS OF CERVICAL REGION: Primary | ICD-10-CM

## 2022-07-14 DIAGNOSIS — Z98.890 HX OF CERVICAL SPINE SURGERY: ICD-10-CM

## 2022-07-19 ENCOUNTER — VIRTUAL VISIT (OUTPATIENT)
Dept: GASTROENTEROLOGY | Facility: CLINIC | Age: 52
End: 2022-07-19
Payer: COMMERCIAL

## 2022-07-19 ENCOUNTER — HOSPITAL ENCOUNTER (OUTPATIENT)
Facility: AMBULATORY SURGERY CENTER | Age: 52
End: 2022-07-19
Attending: INTERNAL MEDICINE
Payer: COMMERCIAL

## 2022-07-19 ENCOUNTER — TELEPHONE (OUTPATIENT)
Dept: GASTROENTEROLOGY | Facility: CLINIC | Age: 52
End: 2022-07-19

## 2022-07-19 DIAGNOSIS — R13.10 DYSPHAGIA, UNSPECIFIED TYPE: Primary | ICD-10-CM

## 2022-07-19 DIAGNOSIS — G06.2 EPIDURAL ABSCESS: ICD-10-CM

## 2022-07-19 DIAGNOSIS — M47.12 CERVICAL SPONDYLOSIS WITH MYELOPATHY: ICD-10-CM

## 2022-07-19 DIAGNOSIS — R12 HEARTBURN: ICD-10-CM

## 2022-07-19 DIAGNOSIS — R68.81 EARLY SATIETY: ICD-10-CM

## 2022-07-19 PROCEDURE — 99204 OFFICE O/P NEW MOD 45 MIN: CPT | Mod: GC | Performed by: INTERNAL MEDICINE

## 2022-07-19 RX ORDER — OMEPRAZOLE 40 MG/1
40 CAPSULE, DELAYED RELEASE ORAL DAILY
Qty: 90 CAPSULE | Refills: 3 | Status: SHIPPED | OUTPATIENT
Start: 2022-07-19 | End: 2022-10-11

## 2022-07-19 NOTE — PROGRESS NOTES
Heron is a 51 year old who is being evaluated via a billable video visit.      How would you like to obtain your AVS? MyChart  If the video visit is dropped, the invitation should be resent by: Text to cell phone: [phone]  Will anyone else be joining your video visit? No         Video-Visit Details    Video Start Time: 8:00    Type of service:  Video Visit     Video End Time: 8:10AM    Originating Location (pt. Location): physical therapy (patient was at physical therapy at time of visit thus video visit was limited)    Distant Location (provider location):  [facility] [place]     Platform used for Video Visit: [facility]    Gastroenterology Visit for: Dave Calero 1970   MRN: [id number]     Reason for Visit:  Dysphagia and acid reflux     Referred by: Dr Kulwinder Calixto  Patient Care Team:  Maurisio Araiza MD as PCP - General Calixto, Kulwinder Moore MD as Assigned Musculoskeletal Provider  Nazario Tapia MD as Assigned Infectious Disease Provider    History of Present Illness:   Dave Calero is a 51 year old male with a pmhx of cervical discitis with epidural abscess (C5-T1) secondary to MSSA and cutibacterium ultimately requiring anterior cervical discectomy with fusion and posterior instrumentation on 2/9 and 2/10/2022 as well as granulicatella and MSSA bacteremia who is referred for new issues of dysphagia and acid reflux.    The patient was simultaneously scheduled for physical therapy on our visit this morning and thus video visit was limited. He had ACDF as well as posterior cervical instrumtentation in February of this year for treatment of discitis and an epidural abscess. Since that time he has struggled with solid food dysphagia. This occurs primarily when sitting up or standing and interestingly improves when lying flat on his back. He does not express odynophagia. In addition, he has developed severe acid reflux and regurgitation  over the last several months to the point that he needs to eat 6-8 hours prior to going to bed and is unable to lay on his right side which will exacerbate his symptoms. Symptoms have persisted despite taking Omeprazole 20mg daily which he takes in the evening. He has never had issues with acid reflux prior to his cervical procedures. He has never had an EGD previously. Lastly he also notes early and prolonged satiety that lasts for hours even after eating very little. He denies any associated abdominal pain, nausea, vomiting, changes in stools, melena, hematochezia, weight loss, fevers, chills or night sweats. No coughing, dyspnea or chest pain. We were not able to discuss history of gastrointestinal malignancy or social habits as he needed to leave the conversation for physical therapy.      Patient had a MBSS on May which showed normal oropharyngeal swallow function though there was concern during the esophageal phase. He subsequently had a esophagram in June which showed decreased clearance from the distal esophagus requiring multiple dry swallows to clear. This was not apparent when patient was lying prone.     In terms of his infection he is to remain on Doxycycline and Rifampin for the next 3 months. He has tolerated these antibiotics well without noted issues.        Family history of colon cancer: not documented   Wt Readings from Last 5 Encounters:   02/09/22 78.9 kg (174 lb)        Esophageal Questionnaire(s)    BEDQ Questionnaire  BEDQ Questionnaire: How Often Have You Had the Following? 7/19/2022   Trouble eating solid food (meat, bread, vegetables) 0   Trouble eating soft foods (yogurt, jello, pudding) 0   Trouble swallowing liquids 0   Pain while swallowing 0   Coughing or choking while swallowing foods or liquids 0   Total Score: 0     BEDQ Questionnaire: Discomfort/Pain Ratings 7/19/2022   Eating solid food (meat, bread, vegetables) 2   Eating soft foods (yogurt, jello, pudding) 0   Drinking liquid  0   Total Score: 2       Eckardt Questionnaire  Eckardt Questionnaire 7/19/2022   Dysphagia 1   Regurgitation 1   Retrosternal Pain 0   Weight Loss (kg) 0   Total Score:  2       Promis 10 Questionnaire  PROMIS 10 FLOWSHEET DATA 3/17/2022 4/28/2022   In general, would you say your health is: 5 3   In general, would you say your quality of life is: 5 3   In general, how would you rate your physical health? 3 3   In general, how would you rate your mental health, including your mood and your ability to think? 5 3   In general, how would you rate your satisfaction with your social activities and relationships? 3 3   In general, please rate how well you carry out your usual social activities and roles. (This includes activities at home, at work and in your community, and responsibilities as a parent, child, spouse, employee, friend, etc.) 3 3   To what extent are you able to carry out your everyday physical activities such as walking, climbing stairs, carrying groceries, or moving a chair? 3 5   In the past 7 days, how often have you been bothered by emotional problems such as feeling anxious, depressed, or irritable? 3 3   In the past 7 days, how would you rate your fatigue on average? 3 3   In the past 7 days, how would you rate your pain on average, where 0 means no pain, and 10 means worst imaginable pain? 5 1   Mental health question re-calculation - no clinical value 3 3   Physical health question re-calculation - no clinical value 3 3   Pain question re-calculation - no clinical value 3 4   Global Mental Health Score 16 12   Global Physical Health Score 12 15   PROMIS TOTAL - SUBSCORES 28 27       STUDIES & PROCEDURES:    EGD:   None    Colonoscopy:  None     EndoFLIP directed at the UES or LES (8cm (EF-325) balloon length or 16cm (EF-322) balloon length):   Date:  8cm balloon  Balloon inflation Balloon pressure CSA (mm^2) DI (mm^2/mmHg) Dmin (mm) Compliance   20 (ladmark ID)        30        70 98            16cm balloon  Balloon inflation Balloon pressure CSA (mm^2) DI (mm^2/mmHg) Dmin (mm) Compliance   30 (ladmark ID)        40        50        60        70           High Resolution Manometry:  None    PH/Impedance:  None     Bravo:  None    CT: cervical spine   Date: 2/10/2022  Impression:  1. Postsurgical changes of anterior cervical discectomy and fusion  C5-6 and C7-T2 with associated postoperative prevertebral soft tissue  edema and emphysema. Consider MRI for evaluation of soft tissue  abscess and osteomyelitis.  2. Multilevel cervical spondylosis as described above.    Esophagram:  Date: 6/3/2022  Impression:  1.  Esophagram is abnormal with the patient upright. There is delay of clearance of barium from  the distal esophagus requiring multiple, dry swallows to clear contrast. This likely explains his globus sensation.   2.  Above is an atypical finding as with the patient prone (used to assess motility), normal primary stripping wave without any abnormal motility seen.  3.  No reflux during study.     MBSS  Date: 5/18/2022:  Impression:  1.  Normal swallow study.  2.  Esophagram may be helpful to assess motility, anatomy and reflux.    Prior medical records were reviewed including, but not limited to, notes from referring providers, lab work, radiographic tests, and other diagnostic tests. Pertinent results were summarized above.     History   No past medical history on file.    Past Surgical History:   Procedure Laterality Date     OPTICAL TRACKING SYSTEM FUSION POSTERIOR CERVICAL THREE + LEVELS N/A 2/12/2022    Procedure: Posterior instrumented spinal fusion cervical 5 to thoracic 2, with laminectomies at Cervical 5, Cervical 6, Smithe Tucker Oseotomy Cervical 5-6, Cervical 6-7 ;  Surgeon: Kulwinder Calixto MD;  Location:  OR     OPTICAL TRACKING SYSTEM FUSION POSTERIOR SPINE LUMBAR N/A 2/9/2022    Procedure: Anterior cervical diskectomy and fusion, cervical 5 to thoracic 1 (3 levels); right  or anterior iliac crest autograft harvest. ;  Surgeon: Kulwinder Calixto MD;  Location: [facility] OR     TRANSESOPHAGEAL ECHOCARDIOGRAM INTRAOPERATIVE N/A 2/15/2022    Procedure: ECHOCARDIOGRAM, TRANSESOPHAGEAL (PREM), INTRAOPERATIVE;  Surgeon: GENERIC ANESTHESIA PROVIDER;  Location: [facility] OR       Social History     Socioeconomic History     Marital status:      Spouse name: Not on file     Number of children: Not on file     Years of education: Not on file     Highest education level: Not on file   Occupational History     Not on file   Tobacco Use     Smoking status: Former Smoker     Smokeless tobacco: Never Used   Substance and Sexual Activity     Alcohol use: Not on file     Drug use: Not on file     Sexual activity: Not on file   Other Topics Concern     Parent/sibling w/ CABG, MI or angioplasty before 65F 55M? Not Asked   Social History Narrative     Not on file     Social Determinants of Health     Financial Resource Strain: Not on file   Food Insecurity: Not on file   Transportation Needs: Not on file   Physical Activity: Not on file   Stress: Not on file   Social Connections: Not on file   Intimate Partner Violence: Not on file   Housing Stability: Not on file       No family history on file.  Family history reviewed and edited as appropriate    Medications and Allergies:     Outpatient Encounter Medications as of 7/19/2022   Medication Sig Dispense Refill     acetaminophen (TYLENOL) 500 MG tablet Take 1-2 tablets (500-1,000 mg) by mouth every 6 hours as needed for mild pain 40 tablet 0     amphetamine-dextroamphetamine (ADDERALL XR) 30 MG 24 hr capsule Take 30 mg by mouth daily       baclofen (LIORESAL) 10 MG tablet Take 5-10 mg by mouth 3 times daily as needed for muscle spasms       cetirizine (ZYRTEC) 10 MG tablet Take 10 mg by mouth daily       diazepam (VALIUM) 5 MG tablet Take 1 tablet (5 mg) by mouth every 6 hours as needed for anxiety or muscle spasms 30 tablet 0     doxycycline hyclate  (VIBRA-TABS) 100 MG tablet Take 1 tablet (100 mg) by mouth 2 times daily 180 tablet 1     FLUoxetine (PROZAC) 10 MG capsule Take 10 mg by mouth daily Take with the 20mg capsule for a total daily dose of 30mg       FLUoxetine (PROZAC) 20 MG capsule Take 20 mg by mouth daily Take with the 10mg capsule for a total daily dose of 30mg       ibuprofen (ADVIL/MOTRIN) 200 MG tablet Take 200 mg by mouth every 4 hours as needed for mild pain       omeprazole (PRILOSEC) 40 MG DR capsule Take 1 capsule (40 mg) by mouth daily 90 capsule 3     rifampin (RIFADIN) 300 MG capsule TAKE 2 CAPSULES BY MOUTH EVERY  capsule 0     senna-docusate (SENOKOT-S/PERICOLACE) 8.6-50 MG tablet Take 1 tablet by mouth 2 times daily 30 tablet 0     VITAMIN D PO Take 1 tablet by mouth daily       camphor-menthol-methyl sal 4-10-30 % CREA Externally apply topically 3 times daily as needed (pain) (Patient not taking: Reported on 7/19/2022)       gabapentin (NEURONTIN) 300 MG capsule Take 300mg by mouth at bedtime for 2 days, then increase to 600mg at bedtime daily (Patient not taking: Reported on 7/19/2022)       hydrOXYzine (ATARAX) 50 MG tablet Take 1 tablet (50 mg) by mouth every 6 hours as needed for itching or other (pain adjunct) (Patient not taking: Reported on 7/19/2022) 40 tablet 0     methocarbamol (ROBAXIN) 750 MG tablet Take 1 tablet (750 mg) by mouth 4 times daily as needed for muscle spasms (Patient not taking: Reported on 7/19/2022) 50 tablet 0     oxyCODONE (ROXICODONE) 5 MG tablet Take 1 tablet (5 mg) by mouth every 4 hours (Patient not taking: Reported on 7/19/2022) 50 tablet 0     pantoprazole (PROTONIX) 40 MG EC tablet Take 1 tablet (40 mg) by mouth daily (Patient not taking: Reported on 7/19/2022) 30 tablet 2     polyethylene glycol (MIRALAX) 17 g packet Take 17 g by mouth daily (Patient not taking: No sig reported) 7 packet 0     No facility-administered encounter medications on file as of 7/19/2022.        No Known  Allergies     Review of systems:  A full 10 point review of systems was obtained and was negative except for the pertinent positives and negatives stated within the HPI.    Objective Findings:   Physical Exam:  Limited with video visit   Constitutional: There were no vitals taken for this visit.  General: Alert, cooperative, no distress, well-appearing  Head: Atraumatic, normocephalic, no obvious abnormalities   Eyes: EOMI, Sclera anicteric, no obvious conjunctival hemorrhage   Respiratory: breathing comfortably on room air, actively exercising at physical therapy  Gastrointestinal: obese abdomen  Musculoskeletal: Range of motion intact, no obvious strength deficit  Skin: No jaundice, no obvious rash  Neurologic: AAOx3, no obvious neurologic abnormality  Psychiatric: Normal Affect, appropriate mood     Labs, Radiology, Pathology     Lab Results   Component Value Date    WBC 6.9 06/14/2022    WBC 7.4 03/24/2022    WBC 7.0 03/17/2022    HGB 15.3 06/14/2022    HGB 11.9 (L) 03/24/2022    HGB 12.6 (L) 03/17/2022     06/14/2022     03/24/2022     03/17/2022    ALT 22 06/14/2022    ALT 26 03/24/2022    ALT 22 03/17/2022    AST 13 06/14/2022    AST 15 03/24/2022    AST 13 03/17/2022     06/14/2022     03/24/2022     03/17/2022    BUN 19 06/14/2022    BUN 13 03/24/2022    BUN 12 03/17/2022    CO2 30 06/14/2022    CO2 28 03/24/2022    CO2 29 03/17/2022    INR 0.98 02/09/2022        Liver Function Studies -   Recent Labs   Lab Test 06/14/22  0920   PROTTOTAL 7.0   ALBUMIN 3.8   BILITOTAL 0.4   ALKPHOS 109   AST 13   ALT 22          Patient Active Problem List    Diagnosis Date Noted     Dysphagia, unspecified type 07/19/2022     Priority: Medium     Heartburn 07/19/2022     Priority: Medium     Early satiety 07/19/2022     Priority: Medium     Hard to intubate 02/12/2022     Priority: Medium     Cervical spondylosis with myelopathy 02/12/2022     Priority: Medium     Encounter for  screening laboratory testing for severe acute respiratory syndrome coronavirus 2 (SARS-CoV-2) 02/12/2022     Priority: Medium     Discitis of cervical region 02/09/2022     Priority: Medium     Epidural abscess 02/09/2022     Priority: Medium      Assessment and Plan   Assessment:    Dave Calero is a 51 year old male with a pmhx of cervical discitis with epidural abscess (C5-T1) secondary to MSSA and cutibacterium ultimately requiring anterior cervical discectomy with fusion and posterior instrumentation on 2/9 and 2/10/2022 as well as granulicatella and MSSA bacteremia who is referred for new issues of dysphagia and acid reflux.    Since his cervical spine procedures in February, he notes solid food dysphagia which appears to get stuck in the bottom of his throat and is improved when lying prone. In addition he notes severe new acid reflux despite taking Omeprazole 20mg daily as well as early and prolonged satiety. He has not lost weight but is eating significantly less due to these symptoms. Oropharyngeal swallow function was normal without aspiration or penetration on recent MBSS though there was concern during the esophageal phase and indeed his esophagram does show decreased clearance in the distal esophagus that is not noted when prone. There was no evidence of reflux or gastric outlet obstruction.    The positional nature of his dysphagia does raise question about extrinsic compression possibly related to hardware or scarring from prior procedures. In addition he may have developed erosive esophagitis with stricturing from the significant acid reflux he has been experiencing over the last several months. Malignancy is also possible though lower on the differential given his rapid onset of symptoms. Moreover, his early and prolonged satiety is suggestive of delayed gastric emptying and given the timeframe of symptoms following recent instrumentation, concern is for vagal nerve injury which could  explain his dysphagia as well. We will plan to evaluate further with upper endoscopy (EGD) along with a gastric emptying study. He has been instructed to increase his PPI to 40mg in the interim for improved symptomatic relief though will need to stop taking this 2 weeks prior to endoscopic evaluation. We will plan to see him again in 4 months to review his results and discuss next steps in treatment. All questions were answered and patient is in agreement with plan.     Plan:  - We will arrange for a gastric emptying study   - We will arrange for upper endoscopic evaluation (EGD)   - Increase Omeprazole to 40mg daily. He is instructed to take this first thing in the morning 30-40 minutes before eating or drinking anything else.  - Stop Omeprazole 2 weeks prior to EGD    Discussed with attending MD Emeterio Greenberg MD  Internal medicine, PGY3    Attending Attestation:  I saw the patient with Dr. Altamirano and agree with the findings and the plan of care as documented in the fellow's note. In summary, the patient is an 51 year old male with a history of cervical discitis with epidural abscess (C5-T1) secondary to MSSA and cutibacterium ultimately requiring anterior cervical discectomy with fusion and posterior instrumentation on 2/9 and 2/10/2022 as well as granulicatella and MSSA bacteremia who is referred for new issues of dysphagia and acid reflux.    The patient was seen in video telemedicine consultation while he was at PT. The visit was short based on his conflicting obligations. His symptoms of dysphagia and acid reflux along with abdominal fullness,  early satiety, and prolonged sensation of fullness all began following his cervical neck surgery. I wonder if there was vagal injury or inflammation following surgery or the epidural abscess. The fact that his dysphagia is improved while supine and worse while upright suggests at least some mechanical component that is compressing or pushing towards the  esophagus while upright and alleviated while supine.     I have asked that he increase the omeprazole to 40mg 30-60 minutes before breakfast daily, with a plan for endoscopy to evaluate. He will hold the omeprazole for 2 weeks before the scope. Lastly, I have ordered a gastric emptying study to evaluate for gastroparesis in the setting of possible vagal injury that can also contribute to his increased reflux.     Overall time spent discussing, thinking, reviewing the chart including available imaging and labs, and evaluating the patient was 30 minutes of which greater than 50% of the time was spent on counseling and coordination of care.    Kyle Hook DO   of Medicine  Director, Esophageal Disorders Program  Division of Gastroenterology, Hepatology, and Nutrition  [facility]          Follow up plan:   Return to clinic in 4 months and as needed.    The risks and benefits of my recommendations, as well as other treatment options were discussed with the patient and any available family today. All questions were answered.     o Follow up: As planned above. Today, I personally spent 10 minutes in direct face to face time with the patient, of which greater than 50% of the time was spent in patient education and counseling as described above. Approximately 20 minutes were spent on indirect care associated with the patient's consultation including but not limited to review of: patient medical records to date, clinic visits, hospital records, lab results, imaging studies, procedural documentation, and coordinating care with other providers. The findings from this review are summarized in the above note. All of the above accounted for a cumulative time of 20 minutes and was performed on the date of service.     The patient verbalized understanding of the plan and was appreciative for the time spent and information provided during the office visit.

## 2022-07-19 NOTE — LETTER
7/19/2022         RE: Dave Calero  [address]  [place] [address]        Dear Colleague,    Thank you for referring your patient, Dave Calero, to the [facility] [place]. Please see a copy of my visit note below.    Gastroenterology Visit for: Dave Calero 1970   MRN: [id number]     Reason for Visit:  Dysphagia and acid reflux     Referred by: Dr Kulwinder Calixto  Patient Care Team:  Maurisio Araiza MD as PCP - General  Durga, Kulwinder Moore MD as Assigned Musculoskeletal Provider  Nazario Tapia MD as Assigned Infectious Disease Provider    History of Present Illness:   Dave Calero is a 51 year old male with a pmhx of cervical discitis with epidural abscess (C5-T1) secondary to MSSA and cutibacterium ultimately requiring anterior cervical discectomy with fusion and posterior instrumentation on 2/9 and 2/10/2022 as well as granulicatella and MSSA bacteremia who is referred for new issues of dysphagia and acid reflux.    The patient was simultaneously scheduled for physical therapy on our visit this morning and thus video visit was limited. He had ACDF as well as posterior cervical instrumtentation in February of this year for treatment of discitis and an epidural abscess. Since that time he has struggled with solid food dysphagia. This occurs primarily when sitting up or standing and interestingly improves when lying flat on his back. He does not express odynophagia. In addition, he has developed severe acid reflux and regurgitation over the last several months to the point that he needs to eat 6-8 hours prior to going to bed and is unable to lay on his right side which will exacerbate his symptoms. Symptoms have persisted despite taking Omeprazole 20mg daily which he takes in the evening. He has never had issues with acid reflux prior to his cervical procedures. He has never had an EGD  previously. Lastly he also notes early and prolonged satiety that lasts for hours even after eating very little. He denies any associated abdominal pain, nausea, vomiting, changes in stools, melena, hematochezia, weight loss, fevers, chills or night sweats. No coughing, dyspnea or chest pain. We were not able to discuss history of gastrointestinal malignancy or social habits as he needed to leave the conversation for physical therapy.      Patient had a MBSS on May which showed normal oropharyngeal swallow function though there was concern during the esophageal phase. He subsequently had a esophagram in June which showed decreased clearance from the distal esophagus requiring multiple dry swallows to clear. This was not apparent when patient was lying prone.     In terms of his infection he is to remain on Doxycycline and Rifampin for the next 3 months. He has tolerated these antibiotics well without noted issues.        Family history of colon cancer: not documented   Wt Readings from Last 5 Encounters:   02/09/22 78.9 kg (174 lb)        Esophageal Questionnaire(s)    BEDQ Questionnaire  BEDQ Questionnaire: How Often Have You Had the Following? 7/19/2022   Trouble eating solid food (meat, bread, vegetables) 0   Trouble eating soft foods (yogurt, jello, pudding) 0   Trouble swallowing liquids 0   Pain while swallowing 0   Coughing or choking while swallowing foods or liquids 0   Total Score: 0     BEDQ Questionnaire: Discomfort/Pain Ratings 7/19/2022   Eating solid food (meat, bread, vegetables) 2   Eating soft foods (yogurt, jello, pudding) 0   Drinking liquid 0   Total Score: 2       Eckardt Questionnaire  Eckardt Questionnaire 7/19/2022   Dysphagia 1   Regurgitation 1   Retrosternal Pain 0   Weight Loss (kg) 0   Total Score:  2       Promis 10 Questionnaire  PROMIS 10 FLOWSHEET DATA 3/17/2022 4/28/2022   In general, would you say your health is: 5 3   In general, would you say your quality of life is: 5 3   In  general, how would you rate your physical health? 3 3   In general, how would you rate your mental health, including your mood and your ability to think? 5 3   In general, how would you rate your satisfaction with your social activities and relationships? 3 3   In general, please rate how well you carry out your usual social activities and roles. (This includes activities at home, at work and in your community, and responsibilities as a parent, child, spouse, employee, friend, etc.) 3 3   To what extent are you able to carry out your everyday physical activities such as walking, climbing stairs, carrying groceries, or moving a chair? 3 5   In the past 7 days, how often have you been bothered by emotional problems such as feeling anxious, depressed, or irritable? 3 3   In the past 7 days, how would you rate your fatigue on average? 3 3   In the past 7 days, how would you rate your pain on average, where 0 means no pain, and 10 means worst imaginable pain? 5 1   Mental health question re-calculation - no clinical value 3 3   Physical health question re-calculation - no clinical value 3 3   Pain question re-calculation - no clinical value 3 4   Global Mental Health Score 16 12   Global Physical Health Score 12 15   PROMIS TOTAL - SUBSCORES 28 27       STUDIES & PROCEDURES:    EGD:   None    Colonoscopy:  None     EndoFLIP directed at the UES or LES (8cm (EF-325) balloon length or 16cm (EF-322) balloon length):   Date:  8cm balloon  Balloon inflation Balloon pressure CSA (mm^2) DI (mm^2/mmHg) Dmin (mm) Compliance   20 (ladmark ID)        30        40        50           16cm balloon  Balloon inflation Balloon pressure CSA (mm^2) DI (mm^2/mmHg) Dmin (mm) Compliance   30 (ladmark ID)        40        50        60        70           High Resolution Manometry:  None    PH/Impedance:  None     Bravo:  None    CT: cervical spine   Date: 2/10/2022  Impression:  1. Postsurgical changes of anterior cervical discectomy and  fusion  C5-6 and C7-T2 with associated postoperative prevertebral soft tissue  edema and emphysema. Consider MRI for evaluation of soft tissue  abscess and osteomyelitis.  2. Multilevel cervical spondylosis as described above.    Esophagram:  Date: 6/3/2022  Impression:  1.  Esophagram is abnormal with the patient upright. There is delay of clearance of barium from  the distal esophagus requiring multiple, dry swallows to clear contrast. This likely explains his globus sensation.   2.  Above is an atypical finding as with the patient prone (used to assess motility), normal primary stripping wave without any abnormal motility seen.  3.  No reflux during study.     MBSS  Date: 5/18/2022:  Impression:  1.  Normal swallow study.  2.  Esophagram may be helpful to assess motility, anatomy and reflux.    Prior medical records were reviewed including, but not limited to, notes from referring providers, lab work, radiographic tests, and other diagnostic tests. Pertinent results were summarized above.     History   No past medical history on file.    Past Surgical History:   Procedure Laterality Date     OPTICAL TRACKING SYSTEM FUSION POSTERIOR CERVICAL THREE + LEVELS N/A 2/12/2022    Procedure: Posterior instrumented spinal fusion cervical 5 to thoracic 2, with laminectomies at Cervical 5, Cervical 6, Smithe Tucker Oseotomy Cervical 5-6, Cervical 6-7 ;  Surgeon: Kulwinder Calixto MD;  Location:  OR     OPTICAL TRACKING SYSTEM FUSION POSTERIOR SPINE LUMBAR N/A 2/9/2022    Procedure: Anterior cervical diskectomy and fusion, cervical 5 to thoracic 1 (3 levels); right or anterior iliac crest autograft harvest. ;  Surgeon: Kulwinder Calixto MD;  Location: [facility] OR     TRANSESOPHAGEAL ECHOCARDIOGRAM INTRAOPERATIVE N/A 2/15/2022    Procedure: ECHOCARDIOGRAM, TRANSESOPHAGEAL (PREM), INTRAOPERATIVE;  Surgeon: GENERIC ANESTHESIA PROVIDER;  Location: [facility] OR       Social History     Socioeconomic History     Marital  status:      Spouse name: Not on file     Number of children: Not on file     Years of education: Not on file     Highest education level: Not on file   Occupational History     Not on file   Tobacco Use     Smoking status: Former Smoker     Smokeless tobacco: Never Used   Substance and Sexual Activity     Alcohol use: Not on file     Drug use: Not on file     Sexual activity: Not on file   Other Topics Concern     Parent/sibling w/ CABG, MI or angioplasty before 65F 55M? Not Asked   Social History Narrative     Not on file     Social Determinants of Health     Financial Resource Strain: Not on file   Food Insecurity: Not on file   Transportation Needs: Not on file   Physical Activity: Not on file   Stress: Not on file   Social Connections: Not on file   Intimate Partner Violence: Not on file   Housing Stability: Not on file       No family history on file.  Family history reviewed and edited as appropriate    Medications and Allergies:     Outpatient Encounter Medications as of 7/19/2022   Medication Sig Dispense Refill     acetaminophen (TYLENOL) 500 MG tablet Take 1-2 tablets (500-1,000 mg) by mouth every 6 hours as needed for mild pain 40 tablet 0     amphetamine-dextroamphetamine (ADDERALL XR) 30 MG 24 hr capsule Take 30 mg by mouth daily       baclofen (LIORESAL) 10 MG tablet Take 5-10 mg by mouth 3 times daily as needed for muscle spasms       cetirizine (ZYRTEC) 10 MG tablet Take 10 mg by mouth daily       diazepam (VALIUM) 5 MG tablet Take 1 tablet (5 mg) by mouth every 6 hours as needed for anxiety or muscle spasms 30 tablet 0     doxycycline hyclate (VIBRA-TABS) 100 MG tablet Take 1 tablet (100 mg) by mouth 2 times daily 180 tablet 1     FLUoxetine (PROZAC) 10 MG capsule Take 10 mg by mouth daily Take with the 20mg capsule for a total daily dose of 30mg       FLUoxetine (PROZAC) 20 MG capsule Take 20 mg by mouth daily Take with the 10mg capsule for a total daily dose of 30mg       ibuprofen  (ADVIL/MOTRIN) 200 MG tablet Take 200 mg by mouth every 4 hours as needed for mild pain       omeprazole (PRILOSEC) 40 MG DR capsule Take 1 capsule (40 mg) by mouth daily 90 capsule 3     rifampin (RIFADIN) 300 MG capsule TAKE 2 CAPSULES BY MOUTH EVERY  capsule 0     senna-docusate (SENOKOT-S/PERICOLACE) 8.6-50 MG tablet Take 1 tablet by mouth 2 times daily 30 tablet 0     VITAMIN D PO Take 1 tablet by mouth daily       camphor-menthol-methyl sal 4-10-30 % CREA Externally apply topically 3 times daily as needed (pain) (Patient not taking: Reported on 7/19/2022)       gabapentin (NEURONTIN) 300 MG capsule Take 300mg by mouth at bedtime for 2 days, then increase to 600mg at bedtime daily (Patient not taking: Reported on 7/19/2022)       hydrOXYzine (ATARAX) 50 MG tablet Take 1 tablet (50 mg) by mouth every 6 hours as needed for itching or other (pain adjunct) (Patient not taking: Reported on 7/19/2022) 40 tablet 0     methocarbamol (ROBAXIN) 750 MG tablet Take 1 tablet (750 mg) by mouth 4 times daily as needed for muscle spasms (Patient not taking: Reported on 7/19/2022) 50 tablet 0     oxyCODONE (ROXICODONE) 5 MG tablet Take 1 tablet (5 mg) by mouth every 4 hours (Patient not taking: Reported on 7/19/2022) 50 tablet 0     pantoprazole (PROTONIX) 40 MG EC tablet Take 1 tablet (40 mg) by mouth daily (Patient not taking: Reported on 7/19/2022) 30 tablet 2     polyethylene glycol (MIRALAX) 17 g packet Take 17 g by mouth daily (Patient not taking: No sig reported) 7 packet 0     No facility-administered encounter medications on file as of 7/19/2022.        No Known Allergies     Review of systems:  A full 10 point review of systems was obtained and was negative except for the pertinent positives and negatives stated within the HPI.    Objective Findings:   Physical Exam:  Limited with video visit   Constitutional: There were no vitals taken for this visit.  General: Alert, cooperative, no distress,  well-appearing  Head: Atraumatic, normocephalic, no obvious abnormalities   Eyes: EOMI, Sclera anicteric, no obvious conjunctival hemorrhage   Respiratory: breathing comfortably on room air, actively exercising at physical therapy  Gastrointestinal: obese abdomen  Musculoskeletal: Range of motion intact, no obvious strength deficit  Skin: No jaundice, no obvious rash  Neurologic: AAOx3, no obvious neurologic abnormality  Psychiatric: Normal Affect, appropriate mood     Labs, Radiology, Pathology     Lab Results   Component Value Date    WBC 6.9 06/14/2022    WBC 7.4 03/24/2022    WBC 7.0 03/17/2022    HGB 15.3 06/14/2022    HGB 11.9 (L) 03/24/2022    HGB 12.6 (L) 03/17/2022     06/14/2022     03/24/2022     03/17/2022    ALT 22 06/14/2022    ALT 26 03/24/2022    ALT 22 03/17/2022    AST 13 06/14/2022    AST 15 03/24/2022    AST 13 03/17/2022     06/14/2022     03/24/2022     03/17/2022    BUN 19 06/14/2022    BUN 13 03/24/2022    BUN 12 03/17/2022    CO2 30 06/14/2022    CO2 28 03/24/2022    CO2 29 03/17/2022    INR 0.98 02/09/2022        Liver Function Studies -   Recent Labs   Lab Test 06/14/22  0920   PROTTOTAL 7.0   ALBUMIN 3.8   BILITOTAL 0.4   ALKPHOS 109   AST 13   ALT 22          Patient Active Problem List    Diagnosis Date Noted     Dysphagia, unspecified type 07/19/2022     Priority: Medium     Heartburn 07/19/2022     Priority: Medium     Early satiety 07/19/2022     Priority: Medium     Hard to intubate 02/12/2022     Priority: Medium     Cervical spondylosis with myelopathy 02/12/2022     Priority: Medium     Encounter for screening laboratory testing for severe acute respiratory syndrome coronavirus 2 (SARS-CoV-2) 02/12/2022     Priority: Medium     Discitis of cervical region 02/09/2022     Priority: Medium     Epidural abscess 02/09/2022     Priority: Medium      Assessment and Plan   Assessment:    Dave Calero is a 51 year old male with a pmhx  of cervical discitis with epidural abscess (C5-T1) secondary to MSSA and cutibacterium ultimately requiring anterior cervical discectomy with fusion and posterior instrumentation on 2/9 and 2/10/2022 as well as granulicatella and MSSA bacteremia who is referred for new issues of dysphagia and acid reflux.    Since his cervical spine procedures in February, he notes solid food dysphagia which appears to get stuck in the bottom of his throat and is improved when lying prone. In addition he notes severe new acid reflux despite taking Omeprazole 20mg daily as well as early and prolonged satiety. He has not lost weight but is eating significantly less due to these symptoms. Oropharyngeal swallow function was normal without aspiration or penetration on recent MBSS though there was concern during the esophageal phase and indeed his esophagram does show decreased clearance in the distal esophagus that is not noted when prone. There was no evidence of reflux or gastric outlet obstruction.    The positional nature of his dysphagia does raise question about extrinsic compression possibly related to hardware or scarring from prior procedures. In addition he may have developed erosive esophagitis with stricturing from the significant acid reflux he has been experiencing over the last several months. Malignancy is also possible though lower on the differential given his rapid onset of symptoms. Moreover, his early and prolonged satiety is suggestive of delayed gastric emptying and given the timeframe of symptoms following recent instrumentation, concern is for vagal nerve injury which could explain his dysphagia as well. We will plan to evaluate further with upper endoscopy (EGD) along with a gastric emptying study. He has been instructed to increase his PPI to 40mg in the interim for improved symptomatic relief though will need to stop taking this 2 weeks prior to endoscopic evaluation. We will plan to see him again in 4 months  to review his results and discuss next steps in treatment. All questions were answered and patient is in agreement with plan.     Plan:  - We will arrange for a gastric emptying study   - We will arrange for upper endoscopic evaluation (EGD)   - Increase Omeprazole to 40mg daily. He is instructed to take this first thing in the morning 30-40 minutes before eating or drinking anything else.  - Stop Omeprazole 2 weeks prior to EGD    Discussed with attending MD Emeterio Greenberg MD  Internal medicine, PGY3    Attending Attestation:  I saw the patient with Dr. Altamirano and agree with the findings and the plan of care as documented in the fellow's note. In summary, the patient is an 51 year old male with a history of cervical discitis with epidural abscess (C5-T1) secondary to MSSA and cutibacterium ultimately requiring anterior cervical discectomy with fusion and posterior instrumentation on 2/9 and 2/10/2022 as well as granulicatella and MSSA bacteremia who is referred for new issues of dysphagia and acid reflux.    The patient was seen in video telemedicine consultation while he was at PT. The visit was short based on his conflicting obligations. His symptoms of dysphagia and acid reflux along with abdominal fullness,  early satiety, and prolonged sensation of fullness all began following his cervical neck surgery. I wonder if there was vagal injury or inflammation following surgery or the epidural abscess. The fact that his dysphagia is improved while supine and worse while upright suggests at least some mechanical component that is compressing or pushing towards the esophagus while upright and alleviated while supine.     I have asked that he increase the omeprazole to 40mg 30-60 minutes before breakfast daily, with a plan for endoscopy to evaluate. He will hold the omeprazole for 2 weeks before the scope. Lastly, I have ordered a gastric emptying study to evaluate for gastroparesis in the setting of  possible vagal injury that can also contribute to his increased reflux.     Overall time spent discussing, thinking, reviewing the chart including available imaging and labs, and evaluating the patient was 30 minutes of which greater than 50% of the time was spent on counseling and coordination of care.    Kyle Hook DO   of Medicine  Director, Esophageal Disorders Program  Division of Gastroenterology, Hepatology, and Nutrition  [facility]          Follow up plan:   Return to clinic in 4 months and as needed.    The risks and benefits of my recommendations, as well as other treatment options were discussed with the patient and any available family today. All questions were answered.     o Follow up: As planned above. Today, I personally spent 10 minutes in direct face to face time with the patient, of which greater than 50% of the time was spent in patient education and counseling as described above. Approximately 20 minutes were spent on indirect care associated with the patient's consultation including but not limited to review of: patient medical records to date, clinic visits, hospital records, lab results, imaging studies, procedural documentation, and coordinating care with other providers. The findings from this review are summarized in the above note. All of the above accounted for a cumulative time of 20 minutes and was performed on the date of service.     The patient verbalized understanding of the plan and was appreciative for the time spent and information provided during the office visit.         Sincerely,    Kyle Hook DO

## 2022-07-19 NOTE — PATIENT INSTRUCTIONS
It was a pleasure taking care of you today.  I've included a brief summary of our discussion and care plan from today's visit below.  Please review this information with your primary care provider.  _______________________________________________________________________    My recommendations are summarized as follows:    - Increase your Omeprazole to 40 mg. Take first thing in the morning 30-40 minutes prior to eating or drinking anything else, including other medications.   - We will arrange for a gastric emptying study to be done.   - We will also arrange for an upper endoscopy evaluation (EGD) .  - If you do not hear from someone about scheduling these studies within the next week, please call the numbers below.      To schedule endoscopic procedures you may call: [phone]  To schedule radiology tests you may call: [phone]  To schedule an ENT appointment you may call: [phone]    Please call my nurse Elizabeth ([phone]), Katlin ([phone]) with any questions or concerns.  If you were seen through the [facility] please feel free to reach out to Naz at [phone]   --    Return to GI Clinic in 4 months to review your progress.    _______________________________________________________________________    Who do I call with any questions after my visit?  Please be in touch if there are any further questions that arise following today's visit.  There are multiple ways to contact your gastroenterology care team.      During business hours, you may reach a Gastroenterology nurse at [phone] and choose option 3.       To schedule or reschedule an appointment, please call [phone].     You can always send a secure message through [employer].  [employer] messages are answered by your nurse or doctor typically within 24 hours.  Please allow extra time on weekends and holidays.      For urgent/emergent questions after business hours, you may reach the on-call GI Fellow by contacting the [facility]  at [phone].     How will I get the results of any tests ordered?    You will receive all of your results.  If you have signed up for [employer], any tests ordered at your visit will be available to you after your physician reviews them.  Typically this takes 1-2 weeks.  If there are urgent results that require a change in your care plan, your physician or nurse will call you to discuss the next steps.      What is [employer]?  Edge[employer] is a secure way for you to access all of your healthcare records from the [facility].  It is a web based computer program, so you can sign on to it from any location.  It also allows you to send secure messages to your care team.  I recommend signing up for Edgeware access if you have not already done so and are comfortable with using a computer.      How to I schedule a follow-up visit?  If you did not schedule a follow-up visit today, please call [phone] to schedule a follow-up office visit.      If you feel you received exceptional care and are interested in supporting the clinical and research goals of Dr. Hook or the Division of Gastroenterology, Hepatology, and Nutrition please contact [email or web address] from the [facility] to discuss opportunities to donate.    Sincerely,    Kyle Hook DO   of Medicine  Director, Esophageal Disorders Program  Division of Gastroenterology, Hepatology, and Nutrition  [facility]

## 2022-07-19 NOTE — TELEPHONE ENCOUNTER
Screening Questions    BlueKIND OF PREP RedLOCATION [review exclusion criteria] GreenSEDATION TYPE      1. Are you active on mychart? Yes    2. What insurance is in the chart? [employer]     3.   Ordering/Referring Provider: Monster    4. BMI   (If greater than 40 review exclusion criteria [PAC APPT IF [MAC] @ [facility])  28.1  [If yes, BMI OVER 40-EXTENDED PREP]      **(Sedation review/consideration needed)**  Do you have a legal guardian or Medical Power of    and/or are you able to give consent for your medical care?     Yes    5. Have you had a positive covid test in the last 90 days?   N - NA    6.  Are you currently on dialysis?   N [ If yes, G-PREP & HOSPITAL setting ONLY]     7.  Do you have chronic kidney disease?  N [ If yes, G-PREP ]    8.   Do you have a diagnosis of diabetes?   N   [ If yes, G-PREP ]    9.  On a regular basis do you go 3-5 days between bowel movements?   NA   [ If yes, EXTENDED PREP]    10.  Are you taking any prescription pain medications on a routine schedule?    N - NA [ If yes, EXTENDED PREP] [If yes, MAC]      11.   Do you have any chemical dependencies such as alcohol, street drugs, or methadone?    N [If yes, MAC]    12.   Do you have any history of post-traumatic stress syndrome, severe anxiety or history of psychosis?    N  [If yes, MAC]    13.  [FEMALES] Are you currently pregnant? NA    If yes, how many weeks?       Respiratory/Heart Screening:  [If yes to any of the following HOSPITAL setting only]     14. Do you have Pulmonary Hypertension [Lungs]?   N       15. Do you have UNCONTROLLED asthma?   N     16.  Do you use daily home oxygen?  N      17. Do you have mod to severe Obstructive Sleep Apnea?         (OKAY @ [facility]  [facility]  SH  PH  RI  MG - if pt is not on OXYGEN)  N      18.   Have you had a heart or lung transplant?   N      19.   Have you had a stroke or Transient ischemic attack (TIA - aka  mini stroke ) within 6 months?  (If yes, please review exclusion criteria)  N      20.   In the past 6 months, have you had any heart related issues including cardiomyopathy or heart attack?   N           If yes, did it require cardiac stenting or other implantable device?   N      21.   Do you have any implantable devices in your body (pacemaker, defib, LVAD)? (If yes, please review exclusion criteria)  N     22. Do you take nitroglycerin?   N           If yes, how often? NA  (if yes, HOSPITAL setting ONLY)    23.  Are you currently taking any blood thinners?    [If yes, INFORM patient to follw up w/ ORDERING PROVIDER FOR BRIDGING INSTRUCTIONS]     N    24.   Do you transfer independently?                (If NO, please HOSPITAL setting ONLY)  Yes    25.   Preferred LOCAL Pharmacy for Pre Prescription:      [id number] [place], [place] - [address] AT [facility] [address]    Scheduling Details  (Please ask for phone number if not scheduled by patient)      Caller : Heron  Date of Procedure: 10/13/22  Surgeon: Monster  Location: [facility]        Sedation Type: MAC per order l   Conscious Sedation- Needs  for 6 hours after the procedure  MAC/General-Needs  for 24 hours after procedure    NA :[Pre-op Required] at [facility]  SH  MG and OR for MAC sedation   (advise patient they will need a pre-op WITH IN 30 DAYS of procedure date)     Type of Procedure Scheduled:   Upper Endoscopy [EGD]    Which Colonoscopy Prep was Sent?:   JORJE -     DONIS CF PATIENTS & GROEN'S PATIENTS NEEDS EXTENDED PREP       Informed patient they will need an adult  Yes  Cannot take any type of public or medical transportation alone    Pre-Procedure Covid test to be completed at [facility] or Externally: HOME  **INFORMED OF HOME TESTING & LAB OPTION**        Confirmed Nurse will call to complete assessment Yes    Additional comments:

## 2022-07-28 ENCOUNTER — HOSPITAL ENCOUNTER (OUTPATIENT)
Dept: NUCLEAR MEDICINE | Facility: CLINIC | Age: 52
Setting detail: NUCLEAR MEDICINE
Discharge: HOME | End: 2022-07-28
Attending: INTERNAL MEDICINE | Admitting: INTERNAL MEDICINE
Payer: COMMERCIAL

## 2022-07-28 DIAGNOSIS — R13.10 DYSPHAGIA, UNSPECIFIED TYPE: ICD-10-CM

## 2022-07-28 DIAGNOSIS — R12 HEARTBURN: ICD-10-CM

## 2022-07-28 DIAGNOSIS — R68.81 EARLY SATIETY: ICD-10-CM

## 2022-07-28 PROCEDURE — 78264 GASTRIC EMPTYING IMG STUDY: CPT | Mod: 26 | Performed by: RADIOLOGY

## 2022-07-28 PROCEDURE — 78264 GASTRIC EMPTYING IMG STUDY: CPT

## 2022-07-28 PROCEDURE — 343N000001 HC RX 343: Performed by: INTERNAL MEDICINE

## 2022-07-28 PROCEDURE — A9541 TC99M SULFUR COLLOID: HCPCS | Performed by: INTERNAL MEDICINE

## 2022-07-28 RX ADMIN — Medication 2.2 MILLICURIE: at 08:58

## 2022-07-28 NOTE — PROGRESS NOTES
This is a recent snapshot of the patient's [facility] medical record.  For current drug dose and complete information and questions, call [phone] or In Basket pool, fv home infusion ([id number])  CSN Number:  [id number]

## 2022-07-29 NOTE — PROGRESS NOTES
This is a recent snapshot of the patient's [facility] medical record.  For current drug dose and complete information and questions, call [phone] or In Basket pool, fv home infusion ([id number])  CSN Number:  [id number]

## 2022-08-06 NOTE — PROGRESS NOTES
This is a recent snapshot of the patient's [facility] medical record.  For current drug dose and complete information and questions, call [phone] or In Basket pool, fv home infusion ([id number])  CSN Number:  [id number]

## 2022-08-25 ENCOUNTER — OFFICE VISIT (OUTPATIENT)
Dept: ORTHOPEDICS | Facility: CLINIC | Age: 52
End: 2022-08-25
Payer: COMMERCIAL

## 2022-08-25 DIAGNOSIS — Z98.890 HX OF CERVICAL SPINE SURGERY: Primary | ICD-10-CM

## 2022-08-25 PROCEDURE — 99213 OFFICE O/P EST LOW 20 MIN: CPT | Performed by: ORTHOPAEDIC SURGERY

## 2022-08-25 NOTE — PROGRESS NOTES
Spine Surgical Hx:  02/09/2022 - St 1 (anterior): ACDF C5-6,C7-T1,T1-2 for discitis C7-T1; Right anterior ICBG harvest (Sembrano), advanced cervical spondylosis with stenosis and myelopathy.  [Implants: Medtronic cervical PEEK cages [id number]].  Cultures: MSSA, Cutibacterium acnes.  02/12/2022 - St 2 (posterior): PISF C5-T2; laminectomies C5 and C6 (C5-6 and C6-7); use of allograft (Sembrano).  [Implants: Medtronic Infinity screw system, 3.5 mm titanium rods x 2].      In-Person Visit    Chief Complaint   Patient presents with     RECHECK     Infectious disease and video swallow      Surgical Followup     DOS 2/12/22 S/PPosterior instrumented spinal fusion cervical 5 to thoracic 2, with laminectomies at Cervical 5, Cervical 6, Smithe Tucker Oseotomy Cervical 5-6, Cervical 6-7        S>  51 year old male, 6 mos postop    Unaccompanied.  Swallowing not improving.  Still reports dysphagia, with both solids and liquids.  Also reports left paraspinal upper back pain (towards the left of the base of posterior surgical scar), that also bulges towards the end of the day.     Follows with Dr. Nazario Tapia (Inf Dse).  Still on PO antibiotics:  - Rifampin 300 mg PO bid.  - Doxycycline 100 mg PO bid.    Saw Speech Path.  Was referred to Gastroenterology.    Neck Disability Index (NDI) Questionnaire    Neck Disability Index (NDI) 8/25/2022   Neck Disability Index: Count 10   NDI: Total Score = SUM (points for all 10 findings) 7   Neck Disability in Percent = (Total Score) / 50 * 100 14 (%)      Preop NDI          NR  6 wk                   48.89%  3 mo                  24.44%  6mo  14%      Visual Analog Pain Scale  Back Pain Scale 0-10: 0  Right leg pain: 0  Left leg pain: 0    PROMIS-10 Scores  Global Mental Health Score: (P) 14  Global Physical Health Score: (P) 16  PROMIS TOTAL - SUBSCORES: (P) 30    O>   Alert, oriented x 3, cooperative.  Not in CP distress.  There were no vitals taken for this visit.  Surgical incision(s)  well-healed, no sign of infection.  Ambulates independently.  Grossly neurologically intact.    Imaging:    Cervical flexion-extension x-rays taken today show stable posterior instrumentation C5-T2, with interbody cage placement C5-6, C7-T1 and T1-2.  No sign of cage migration.  No sign of fixation loosening or failure.    A>   1.  6 months status post anterior posterior fusion C5-T2 for spondylodiscitis (MSSA and Cutibacterium acnes), currently still on oral antibiotics (rifampin and doxycycline), comanaged by infectious disease (Dr. Nazario Tapia).  2.  Persistent postoperative dysphagia.  3.  Left upper thoracic paraspinal pain near the base of posterior surgical incision.    P>    Had a good discussion with patient.  Currently, he has 2 complaints: Persistent dysphagia and pain on the left upper thoracic spine.  I reassured him regarding my clinical and radiographic findings.  His fusion seems to be healing well.  Although there is evidence of spondylosis or degenerative changes at the other cervical levels, and still with loss of cervical lordosis, I do not think that surgery is warranted at this point.    His infection seems to be resolving if not already resolved.  Although he is currently still on oral antibiotics, he said he was told by infectious disease that this is not going to be a lifelong treatment.  We reviewed his inflammatory markers.  His CRP from June was already down to 0.2.  Clinically, his incisions are well-healed, with no evidence of ongoing infection.    He has EGD scheduled with gastroenterology on 10/13/2022.  I encouraged him to continue with this procedure.  We will defer to gastroenterology and speech pathology regarding further treatment of dysphagia.    Return to clinic in 6 months (1 year postoperative) with cervical CT scan for fusion status evaluation.  15 minutes spent on the date of the encounter doing chart review/review of outside records/review of test results/interpretation  of tests/patient visit/documentation/discussion with other provider(s)/discussion with patient and family.    Kulwinder Calixto MD    Orthopaedic Spine Surgery  Dept Orthopaedic Surgery, [facility]  [phone] office, [phone] pager  [email or web address]

## 2022-08-25 NOTE — LETTER
8/25/2022         RE: Dave Calero  [address]  [place] [address]        Dear Colleague,    Thank you for referring your patient, Dave Calero, to the [facility]. Please see a copy of my visit note below.    Spine Surgical Hx:  02/09/2022 - St 1 (anterior): ACDF C5-6,C7-T1,T1-2 for discitis C7-T1; Right anterior ICBG harvest (Sembrano), advanced cervical spondylosis with stenosis and myelopathy.  [Implants: Medtronic cervical PEEK cages [id number]].  Cultures: MSSA, Cutibacterium acnes.  02/12/2022 - St 2 (posterior): PISF C5-T2; laminectomies C5 and C6 (C5-6 and C6-7); use of allograft (Sembrano).  [Implants: Medtronic Infinity screw system, 3.5 mm titanium rods x 2].      In-Person Visit    Chief Complaint   Patient presents with     RECHECK     Infectious disease and video swallow      Surgical Followup     DOS 2/12/22 S/PPosterior instrumented spinal fusion cervical 5 to thoracic 2, with laminectomies at Cervical 5, Cervical 6, Smithe Tucker Oseotomy Cervical 5-6, Cervical 6-7        S>  51 year old male, 6 mos postop    Unaccompanied.  Swallowing not improving.  Still reports dysphagia, with both solids and liquids.  Also reports left paraspinal upper back pain (towards the left of the base of posterior surgical scar), that also bulges towards the end of the day.     Follows with Dr. Nazario Tapia (Inf Dse).  Still on PO antibiotics:  - Rifampin 300 mg PO bid.  - Doxycycline 100 mg PO bid.    Saw Speech Path.  Was referred to Gastroenterology.    Neck Disability Index (NDI) Questionnaire    Neck Disability Index (NDI) 8/25/2022   Neck Disability Index: Count 10   NDI: Total Score = SUM (points for all 10 findings) 7   Neck Disability in Percent = (Total Score) / 50 * 100 14 (%)      Preop NDI          NR  6 wk                   48.89%  3 mo                  24.44%  6mo  14%      Visual Analog Pain Scale  Back Pain Scale 0-10:  0  Right leg pain: 0  Left leg pain: 0    PROMIS-10 Scores  Global Mental Health Score: (P) 14  Global Physical Health Score: (P) 16  PROMIS TOTAL - SUBSCORES: (P) 30    O>   Alert, oriented x 3, cooperative.  Not in CP distress.  There were no vitals taken for this visit.  Surgical incision(s) well-healed, no sign of infection.  Ambulates independently.  Grossly neurologically intact.    Imaging:    Cervical flexion-extension x-rays taken today show stable posterior instrumentation C5-T2, with interbody cage placement C5-6, C7-T1 and T1-2.  No sign of cage migration.  No sign of fixation loosening or failure.    A>   1.  6 months status post anterior posterior fusion C5-T2 for spondylodiscitis (MSSA and Cutibacterium acnes), currently still on oral antibiotics (rifampin and doxycycline), comanaged by infectious disease (Dr. Nazario Tapia).  2.  Persistent postoperative dysphagia.  3.  Left upper thoracic paraspinal pain near the base of posterior surgical incision.    P>    Had a good discussion with patient.  Currently, he has 2 complaints: Persistent dysphagia and pain on the left upper thoracic spine.  I reassured him regarding my clinical and radiographic findings.  His fusion seems to be healing well.  Although there is evidence of spondylosis or degenerative changes at the other cervical levels, and still with loss of cervical lordosis, I do not think that surgery is warranted at this point.    His infection seems to be resolving if not already resolved.  Although he is currently still on oral antibiotics, he said he was told by infectious disease that this is not going to be a lifelong treatment.  We reviewed his inflammatory markers.  His CRP from June was already down to 0.2.  Clinically, his incisions are well-healed, with no evidence of ongoing infection.    He has EGD scheduled with gastroenterology on 10/13/2022.  I encouraged him to continue with this procedure.  We will defer to gastroenterology and  speech pathology regarding further treatment of dysphagia.    Return to clinic in 6 months (1 year postoperative) with cervical CT scan for fusion status evaluation.  15 minutes spent on the date of the encounter doing chart review/review of outside records/review of test results/interpretation of tests/patient visit/documentation/discussion with other provider(s)/discussion with patient and family.    Kulwinder Calixto MD    Orthopaedic Spine Surgery  Dept Orthopaedic Surgery, [facility]  [phone] office, [phone] pager  [email or web address]

## 2022-08-31 NOTE — PROGRESS NOTES
This is a recent snapshot of the patient's [facility] medical record.  For current drug dose and complete information and questions, call [phone] or In Basket pool, fv home infusion ([id number])  CSN Number:  [id number]

## 2022-09-02 NOTE — PROGRESS NOTES
This is a recent snapshot of the patient's [facility] medical record.  For current drug dose and complete information and questions, call [phone] or In Basket pool, fv home infusion ([id number])  CSN Number:  [id number]

## 2022-09-15 ENCOUNTER — VIRTUAL VISIT (OUTPATIENT)
Dept: INFECTIOUS DISEASES | Facility: CLINIC | Age: 52
End: 2022-09-15
Attending: STUDENT IN AN ORGANIZED HEALTH CARE EDUCATION/TRAINING PROGRAM
Payer: COMMERCIAL

## 2022-09-15 DIAGNOSIS — M47.12 CERVICAL SPONDYLOSIS WITH MYELOPATHY: ICD-10-CM

## 2022-09-15 DIAGNOSIS — Z51.81 THERAPEUTIC DRUG MONITORING: ICD-10-CM

## 2022-09-15 DIAGNOSIS — K21.9 GASTROESOPHAGEAL REFLUX DISEASE, UNSPECIFIED WHETHER ESOPHAGITIS PRESENT: ICD-10-CM

## 2022-09-15 DIAGNOSIS — M46.22 OSTEOMYELITIS OF CERVICAL SPINE (H): Primary | ICD-10-CM

## 2022-09-15 PROCEDURE — G0463 HOSPITAL OUTPT CLINIC VISIT: HCPCS | Mod: PN,RTG | Performed by: STUDENT IN AN ORGANIZED HEALTH CARE EDUCATION/TRAINING PROGRAM

## 2022-09-15 PROCEDURE — 99215 OFFICE O/P EST HI 40 MIN: CPT | Mod: GT | Performed by: STUDENT IN AN ORGANIZED HEALTH CARE EDUCATION/TRAINING PROGRAM

## 2022-09-15 NOTE — PROGRESS NOTES
Dave Calero  is being evaluated via a billable video visit.      How would you like to obtain your AVS? AnyCloud  For the video visit, send the invitation by: Text to cell phone: [phone]  Will anyone else be joining your video visit? No      Video Start Time: 1;05 pm  Video-Visit Details    Type of service:  Video Visit    Video End Time: 1:14 pm  Originating Location (pt. Location):Home    Distant Location (provider location):  [facility] INFECTIOUS DISEASE CLINIC [place]     Platform used for Video Visit: iMedX      [facility]  Infectious Disease Clinic Note: Follow up     Patient:  Dave Calero, Date of birth 1970, Medical record number [id number]  Date of Visit: 09/15/22         Assessment and Recommendations:       Assessment:  ID Problem List:  1. Cervical discitis with epidural abscess  -s/p ACDF (2/9) and posterior instrmentation (2/10)  -Intraop cultures with MSSA, Cutibacterium  2. Granulicatella and MSSA bacteremia        Discussion:  50yo M initially admitted with cervical discitis and epidural abscess, s/p ACDF (2/9) and posterior instrumentation (2/10) with intraoperative cultures growing MSSA and Cutibacterium. Also had bacteremia (Granulicatella and MSSA).  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    First seen by me at ID clinic follow up 3/10/22. CRPS had trended down to normal. Pt with no obviouss concerns from an infection standpoint but some headache and difficulty swallowing after cracking his neck in bed one night. Unable to eat without laying down sometimes. Since these were new since postop period reached out to Ortho to check on these earlier than planned follow up.  We started  Pantoprazole tablet daily to see if it helps reflux, until he can be seen by GI. Had speech eval and video swallow, Gi seen, they increased pantroprazole to 40, no improvement in symptoms, awaiting EGD. Ortho seen in interim, no concerns, xray done and reviewed by them.     6 weeks were to be completed 3/25, had a CRP elevation to 12 3/24, so repeated 3/28, and stopped IV Vanc 3/29 after this resulted normal. 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  Anticipated minimum 3 months of Doxycyline+Rifampin, extended another 3 months at 3 month follow up as he thought he was tolerating them and was ok continuing, given optimal duration unclear    Other than persistent difficulty swallowing, reflux he is doing well from infection standpoint. Some pain below surgical site over past 4 days, likely muscular. Labs done at last visit including CBC, CMP normal, CRP down to 0.2    There is no clear evidence for duration of chronic suppression, most people get 6 months to a year. He is at around 7 months from surgery now. Given GI issues, and in case medications are contributing to it, feel at this point would be reasonable to stop Rifampin. I do wonder if Doxy is contributing to his GERD, so will re-evaluate if no other cause found for GERD after GI eval. Pt reports reflux started much later after he started taking Doxy so he did not feel it is related likely.     Recommendations:  - Stop Rifampin  - Continue Doxycycline, we will plan to discuss one year vs indefinite at follow up  - Ensure to take Doxycycline with plenty of water  - If back pain persists/worsens or if fevers/chills/sweats, advised to reach out to us/ortho to consider imaging  - Follow up with GI regarding any medication adjustment for GI symptoms  - Follow up with me in 3 months    Total time on day of visit including chart review, visit, counseling, documentation and coordination of care: 40  minutes    Nazario Tapia   of Medicine  Division of Infectious Diseases         Interval events     Had interval visit with Dr Calixto where surgical site looked ok. Some pain below surgical site for past 4 days, otherwise no new issues, no fevers, chills, has chronic sweats    Swallowing issues persist. GI seen, planned EGD pending. They had increased pantoprazole to 40 but no change in reflux symptoms. No weight loss. Unable to eat without laying down sometimes.     Reports tolerating antibiotics      No past medical history on file.    Past Surgical History:   Procedure Laterality Date     OPTICAL TRACKING SYSTEM FUSION POSTERIOR CERVICAL THREE + LEVELS N/A 2/12/2022    Procedure: Posterior instrumented spinal fusion cervical 5 to thoracic 2, with laminectomies at Cervical 5, Cervical 6, Smithe Tucker Oseotomy Cervical 5-6, Cervical 6-7 ;  Surgeon: Kulwinder Calixto MD;  Location: [facility] OR     OPTICAL TRACKING SYSTEM FUSION POSTERIOR SPINE LUMBAR N/A 2/9/2022    Procedure: Anterior cervical diskectomy and fusion, cervical 5 to thoracic 1 (3 levels); right or anterior iliac crest autograft harvest. ;  Surgeon: Kulwinder Calixto MD;  Location: [facility] OR     TRANSESOPHAGEAL ECHOCARDIOGRAM INTRAOPERATIVE N/A 2/15/2022    Procedure: ECHOCARDIOGRAM, TRANSESOPHAGEAL (PREM), INTRAOPERATIVE;  Surgeon: GENERIC ANESTHESIA PROVIDER;  Location: [facility] OR       No family history on file.    Social History     Social History Narrative     Not on file     Social History     Tobacco Use     Smoking status: Former Smoker     Smokeless tobacco: Never Used       Immunization History   Administered Date(s) Administered     COVID-19,PF,Moderna 04/14/2021, 05/12/2021       Patient Active Problem List   Diagnosis     Discitis of cervical region     Epidural abscess     Hard to intubate     Cervical spondylosis with myelopathy     Encounter for screening laboratory testing for severe acute respiratory  syndrome coronavirus 2 (SARS-CoV-2)     Dysphagia, unspecified type     Heartburn     Early satiety       Current Outpatient Medications   Medication Sig     acetaminophen (TYLENOL) 500 MG tablet Take 1-2 tablets (500-1,000 mg) by mouth every 6 hours as needed for mild pain     amphetamine-dextroamphetamine (ADDERALL XR) 30 MG 24 hr capsule Take 30 mg by mouth daily     baclofen (LIORESAL) 10 MG tablet Take 5-10 mg by mouth 3 times daily as needed for muscle spasms     cetirizine (ZYRTEC) 10 MG tablet Take 10 mg by mouth daily     diazepam (VALIUM) 5 MG tablet Take 1 tablet (5 mg) by mouth every 6 hours as needed for anxiety or muscle spasms     doxycycline hyclate (VIBRA-TABS) 100 MG tablet Take 1 tablet (100 mg) by mouth 2 times daily     FLUoxetine (PROZAC) 10 MG capsule Take 10 mg by mouth daily Take with the 20mg capsule for a total daily dose of 30mg     FLUoxetine (PROZAC) 20 MG capsule Take 20 mg by mouth daily Take with the 10mg capsule for a total daily dose of 30mg     ibuprofen (ADVIL/MOTRIN) 200 MG tablet Take 200 mg by mouth every 4 hours as needed for mild pain     omeprazole (PRILOSEC) 40 MG DR capsule Take 1 capsule (40 mg) by mouth daily     rifampin (RIFADIN) 300 MG capsule TAKE 2 CAPSULES BY MOUTH EVERY DAY     senna-docusate (SENOKOT-S/PERICOLACE) 8.6-50 MG tablet Take 1 tablet by mouth 2 times daily     camphor-menthol-methyl sal 4-10-30 % CREA Externally apply topically 3 times daily as needed (pain) (Patient not taking: No sig reported)     gabapentin (NEURONTIN) 300 MG capsule Take 300mg by mouth at bedtime for 2 days, then increase to 600mg at bedtime daily (Patient not taking: No sig reported)     hydrOXYzine (ATARAX) 50 MG tablet Take 1 tablet (50 mg) by mouth every 6 hours as needed for itching or other (pain adjunct) (Patient not taking: No sig reported)     methocarbamol (ROBAXIN) 750 MG tablet Take 1 tablet (750 mg) by mouth 4 times daily as needed for muscle spasms (Patient not  taking: No sig reported)     oxyCODONE (ROXICODONE) 5 MG tablet Take 1 tablet (5 mg) by mouth every 4 hours (Patient not taking: No sig reported)     pantoprazole (PROTONIX) 40 MG EC tablet Take 1 tablet (40 mg) by mouth daily (Patient not taking: No sig reported)     polyethylene glycol (MIRALAX) 17 g packet Take 17 g by mouth daily (Patient not taking: No sig reported)     VITAMIN D PO Take 1 tablet by mouth daily (Patient not taking: Reported on 9/15/2022)     No current facility-administered medications for this visit.       No Known Allergies           Physical Exam:     There were no vitals taken for this visit.    Limited video Exam:  GENERAL:  well-developed, well-nourished, alert, oriented, in no acute distress.  HEENT:  Head is normocephalic, atraumatic, collar now off  EYES:  Eyes have anicteric sclerae.    LUNGS:  Breathing comfortably  SKIN:  No acute rashes.    NEUROLOGIC:  Grossly nonfocal.         Laboratory Data:     Inflammatory Markers    Recent Labs   Lab Test 06/14/22  0920 03/28/22  1315 03/24/22  1830 03/17/22  1830 03/10/22  1815 03/03/22  1810 02/24/22  1900 02/17/22  0845   CRP 0.2 4.6 12.0* 3.3 4.9 6.1 14.0* 53.2*       Metabolic Studies       Recent Labs   Lab Test 06/14/22  0920 03/24/22  1830 03/17/22  1830 03/10/22  1815 03/03/22  1810 02/24/22  1900 02/15/22  1211 02/14/22  1803 02/13/22  0634 02/12/22  1153    140 139 138 140 139   < >  --    < > 138   POTASSIUM 4.0 3.7 4.0 4.3 4.2 4.2   < >  --    < > 3.8   CHLORIDE 105 107 107 105 107 106   < >  --    < >  --    CO2 30 28 29 29 28 28   < >  --    < >  --    ANIONGAP 4* 5 3 4 5 5   < >  --    < >  --    BUN 19 13 12 15 10 13   < >  --    < >  --    CR 0.81 0.86 0.77 0.76 0.72 0.68   < >  --    < >  --    GFRESTIMATED >90 >90 >90 >90 >90 >90   < >  --    < >  --     94 90 78 98 122*   < >  --    < > 117*   VIANEY 9.2 8.9 8.9 8.8 9.0 8.6   < >  --    < >  --    LACT  --   --   --   --   --   --   --  1.3  --  0.8    < > =  values in this interval not displayed.       Hematology Studies      Recent Labs   Lab Test 06/14/22  0920 03/24/22  1830 03/17/22  1830 03/10/22  1815 03/03/22  1810 02/24/22  1900   WBC 6.9 7.4 7.0 6.0 7.9 9.2   HGB 15.3 11.9* 12.6* 12.0* 11.3* 10.4*   HCT 44.8 35.8* 38.8* 36.8* 36.0* 33.2*    312 314 353 476* 515*       Clotting Studies    Recent Labs   Lab Test 02/09/22  1438   INR 0.98       Urine Studies     Recent Labs   Lab Test 02/10/22  1918   URINEPH 5.5   NITRITE Negative   LEUKEST Negative   WBCU 1       Imaging:  Results for orders placed or performed during the hospital encounter of 02/09/22   XR Surgery DEVORA Fluoro L/T 5 Min w Stills    Narrative    Exam: XR SURGERY DEVORA FLUORO LESS THAN 5 MIN W STILLS, 2/9/2022 11:33  PM    Provided History: Anterior cervical discectomy and fusion, possible  anterior plate fixation C5-T1, right or left anterior iliac crest  autograft harvest  ICD-10:    Comparison: None.    Technique: Intraoperative imaging.    Findings:    A single lateral intraoperative image from 11:13 PM 2/9/2022 shows  linear surgical probe tip projecting over the anterior aspect of the  inferior endplate of C6.    Additional surgical clamp projects over the C6-7 intervertebral disc  space. Endotracheal tube is partially imaged.       Impression    Impression: Instrumentation projecting toward anterior aspect of the  inferior endplate of C6.    The above indicated surgical level was reported to operating room  personnel, specifically Dr Calixto, via telephone by Dr Andujar  on 2/9/2022 11:33 PM.    I have personally reviewed the examination and initial interpretation  and I agree with the findings.    LAUREN MEAD MD         SYSTEM ID:  [id number]   MR Cervical Spine w/o & w Contrast    Narrative    MR CERVICAL SPINE W/O & W CONTRAST 2/10/2022 3:00 PM    Provided History: Epidural abscess; s/p acdf  MR CERVICAL SPINE W/O & W CONTRAST 2/10/2022 3:00 PM    Provided History:  Epidural abscess; s/p acdf    Comparison: Same day CT cervical spine    Technique: Sagittal T1-weighted, sagittal T2-weighted, sagittal  diffusion weighted, axial T2-weighted images of the cervical spine  were obtained without intravenous contrast. Following intravenous  administration of gadolinium, axial and sagittal T1-weighted images  with fat saturation were also obtained.    Contrast: 7.5ML iv Gadavist    Findings:  Minimal anterolisthesis C2-3.    Postsurgical changes of anterior fusion with interbody devices at  C5-6, C7-T1, and T1-2.      Bone marrow edema and associated enhancement involving the C6-T2  vertebral bodies. Bone marrow edema extends into T1 and T2 posterior  elements    Extensive retropharyngeal/prevertebral soft tissue edema and  enhancement extending from C5 down to T3 level. Anterior and posterior  epidural T2 hyperintense signal with associated enhancement extending  from C5-6 down to T2-3. Edema and enhancement extends into bilateral  neural foramina from C6 to T2. Associated severe spinal canal stenosis  at C6-7 at C7-T1. Edema and associated enhancement in the interspinous  region at C7-T1 and T1-T2 levels. Moderate spinal canal stenosis C5-6.  No definite epidural fluid collection.    Multilevel disc height narrowing and disc desiccation. On a level by  level basis;    C2-3: Bilateral uncovertebral spurring and facet hypertrophy resulting  in mild bilateral neural stenosis. No spinal canal stenosis.    C3-4: Disc osteophyte complex and bilateral uncinate spurring,  eccentric to the left. Left greater than right facet hypertrophy.  Severe left neural foraminal stenosis. Mild right neural foraminal  stenosis. No spinal canal stenosis.    C4-5: Uncovertebral spurring in the right greater than left facet  hypertrophy. Mild to moderate right and mild left neural foraminal  stenosis. No spinal canal stenosis.    C5-6: Fusion level. Severe bilateral neural foraminal and moderate  spinal canal  stenosis secondary to uncovertebral spurring and  superimposed epidural inflammatory findings.    C6-7: Disc osteophyte complex and bilateral uncinate spurring.  Bilateral facet hypertrophy. Epidural inflammatory/infectious findings  contribute to severe bilateral neural foraminal and severe spinal  canal stenosis.     C7-T1: Fusion level. Epidural inflammatory/infectious findings  contribute to severe bilateral neural foraminal and severe spinal  canal stenosis      Impression    Impression:   1. Early postsurgical changes of instrumented posterior spinal fusion  with interbody devices at C5-C6, C7-T1 and T1-T2.   2. Bone marrow edema and associated enhancement involving the C6-T2  vertebrae, consistent with osteomyelitis. Epidural  phlegmon extending  from phlegmon C5-6 down to T2-3. There are several questionable tiny  fluid pockets pockets, for example in the anterior epidural space at  C6 level. However it is mostly phlegmon. Edema and enhancement extends  into bilateral neural foramina from C6 to T2. Associated severe spinal  canal stenosis at C6-7 at C7-T1 and moderate spinal canal stenosis  C5-6.  3. Extensive retropharyngeal/prevertebral soft tissue edema and  enhancement extending from C5 down to T3 level. This is combination of  postsurgical changes and inflammatory/infectious findings.     I have personally reviewed the examination and initial interpretation  and I agree with the findings.    ANDRE ESTRADA MD         SYSTEM ID:  [id number]   CT Cervical Spine w/o Contrast    Narrative    CT CERVICAL SPINE W/O CONTRAST 2/10/2022 12:06 PM    Provided History: Epidural abscess; s/p anterior fusion; to OR Friday  or Saturday for posterior approach    Comparison: None available    Technique: Using multidetector thin collimation helical acquisition  technique, axial, coronal and sagittal CT images through the cervical  spine were obtained without intravenous contrast.     Findings:  Postsurgical changes of  anterior cervical discectomy and fusion with  interbody devices at C5-6, C7-T1 and T1-2. Associated postoperative  prevertebral soft tissue edema and emphysema. The cervical vertebrae  are normally aligned. Trace anterolisthesis of C3 on C4. Straightening  of the expected cervical lordosis. No acute fracture or subluxation.  Heterogeneous sclerotic changes of the C5 and C6 vertebral bodies.  Multilevel degenerative changes with osteophytic and uncovertebral  spurring, facet arthropathy, and moderate disc space narrowing at C6-7  The findings on a level by level basis are as follows:    C2-3:  Bilateral uncovertebral and facet hypertrophy, left greater  than right. Mild left neuroforaminal narrowing. No right  neuroforaminal or spinal canal stenosis.    C3-4:  Disc osteophyte complex. Bilateral uncovertebral and facet  hypertrophy, left greater than right. Mild to moderate left  neuroforaminal narrowing. No right neuroforaminal or spinal canal  stenosis    C4-5:  Bilateral uncovertebral and facet hypertrophy. No spinal canal  or neural foraminal stenosis.    C5-6:  Bilateral uncovertebral and facet hypertrophy, right greater  the left. Moderate bilateral neural foraminal narrowing. No spinal  canal stenosis.    C6-7:  Disc osteophyte complex. Bilateral uncovertebral and facet  hypertrophy. Mild-to-moderate bilateral neural foraminal narrowing. No  spinal canal stenosis.    C7-T1:  No spinal canal or neural foraminal stenosis.       Impression    Impression:   1. Postsurgical changes of anterior cervical discectomy and fusion  C5-6 and C7-T2 with associated postoperative prevertebral soft tissue  edema and emphysema. Consider MRI for evaluation of soft tissue  abscess and osteomyelitis.  2. Multilevel cervical spondylosis as described above.    I have personally reviewed the examination and initial interpretation  and I agree with the findings.    HOME ROSE MD         SYSTEM ID:  [id number]    Surgery DEVORA L/T 5  Min Fluoro w Stills    Narrative    This exam was marked as non-reportable because it will not be read by a   radiologist or a [place] non-radiologist provider.         XR Cervical Spine 2/3 Views    Narrative    XR CERVICAL SPINE 2/3 VWS 2022 8:29 AM    Provided History: status post Anterior and posterior cervical fusion   ICD-10:    Comparison: MRI cervical spine 2/10/2022. CT cervical spine 2/10/2022.    Findings: AP and lateral views of the cervical spine were obtained.  Posterior fusion instrumentation from C5-T2. Intervening lower  cervical laminectomies. Interbody spacers at C5-6, C7-T1, and T1-2.  Straightening of cervical lordosis. Focal kyphosis at C7-T1 and grade  1 anterolisthesis of C7 on T1. Moderate prevertebral edema. Multilevel  degenerative changes and cutting endplate osteophytosis, uncinate  hypertrophy, and facet hypertrophy.    No mass in the visualized lung apices.      Impression    Impression:  1. New posterior fusion instrumentation from C5-T2.  2. Persistent changes of ACDF at C5-6, C7-T1, and T1-2.  3. Persistent prevertebral edema.         DIANDRA BROWN MD         SYSTEM ID:  [id number]   Echo Complete     Value    LVEF  55-60%    Narrative    [id number]  [id number]  [id number]  [id number]^DENVER^CARLO^LANA     [facility],[place]  Echocardiography Laboratory  [address] [place] [id number]     Name: SONYA ARAUJO  MRN: [id number]  : 1970  Study Date: 2022 02:35 PM  Age: 51 yrs  Gender: Male  Patient Location: [facility]  Reason For Study: Endocarditis  Ordering Physician: CARLO GOFF  Performed By: Rachel Dunbar RDCS     BSA: 1.9 m2  Height: 69 in  Weight: 174 lb  HR: 109  BP: 135/80 mmHg  ______________________________________________________________________________  Procedure  Complete Portable Echo Adult. Contrast Optison. Patient was given 5 ml mixture  of 3 ml Optison and 6 ml saline. 4 ml  wasted.  ______________________________________________________________________________  Interpretation Summary  Global and regional left ventricular function is normal with an EF of 55-60%.  The right ventricle is normal in function and size.  No significant valvular abnormality.  No pericardial effusion is present.  IVC diameter <2.1 cm collapsing >50% with sniff suggests a normal RA pressure  of 3 mmHg.  There is no prior study for direct comparison.  ______________________________________________________________________________  Left Ventricle  Left ventricular size is normal. Global and regional left ventricular function  is normal with an EF of 55-60%. Thickening of the anterobasal septum is  present. Diastolic function not assessed due to tachycardia. No regional wall  motion abnormalities are seen.     Right Ventricle  The right ventricle is normal size. Global right ventricular function is  normal.     Atria  Both atria appear normal.     Mitral Valve  The mitral valve is normal. Trace mitral insufficiency is present.     Tricuspid Valve  The tricuspid valve is normal. Trace tricuspid insufficiency is present. The  peak velocity of the tricuspid regurgitant jet is not obtainable.     Pulmonic Valve  On Doppler interrogation, there is no significant stenosis or regurgitation.  The valve leaflets are not well visualized.     Vessels  The aorta root is normal. The thoracic aorta is normal. IVC diameter <2.1 cm  collapsing >50% with sniff suggests a normal RA pressure of 3 mmHg.     Pericardium  No pericardial effusion is present.     Compared to Previous Study  There is no prior study for direct comparison.     ______________________________________________________________________________  MMode/2D Measurements & Calculations  IVSd: 1.3 cm  LVIDd: 3.9 cm  LVIDs: 3.1 cm  LVPWd: 0.82 cm  FS: 21.3 %     LV mass(C)d: 132.4 grams  LV mass(C)dI: 68.0 grams/m2  Ao root diam: 2.9 cm  asc Aorta Diam: 2.7 cm  LVOT diam:  2.0 cm  LVOT area: 3.1 cm2  LA Volume (BP): 24.0 ml  LA Volume Index (BP): 12.3 ml/m2  RWT: 0.42     Doppler Measurements & Calculations  PA acc time: 0.08 sec     ______________________________________________________________________________  Report approved by: Farhan Stevenson 2022 03:30 PM         Transesophageal Echocardiogram     Value    LVEF  60-65%    Narrative    [id number]  [facility]  [id number]  [id number]^DENVER^CARLO^LANA     [facility],[place]  Echocardiography Laboratory  [address] [place] [id number]     Name: SONYA ARAUJO  MRN: [id number]  : 1970  Study Date: 02/15/2022 01:42 PM  Age: 51 yrs  Gender: Male  Patient Location: [facility]  Reason For Study: 2nd degree AV Block, Endocarditis  Ordering Physician: CARLO GOFF  Performed By: Son Vale     Attestation  I was present during PREM probe placement by the fellow, Son Vale. I  personally viewed the imaging and agree with the interpretation and report as  documented by the fellow.  ______________________________________________________________________________  Interpretation Summary  Normal biventricular function.  No valvular abnormalities or vegetations noted.  No pericardial effusion present.  ______________________________________________________________________________  Procedure  Intraoperative Transesophageal Echocardiogram with color and spectral Doppler  performed. 3D image acquisition, reconstruction, and real-time interpretation  was performed. Procedure location Operating Room. Informed consent for  Transesophegeal echo obtained. PREM Probe #[id number] was used during the procedure.  Sedation, endotracheal intubation, and mechanical ventilation were initiated  prior to the PREM and were monitored by anesthesia. The Transducer was inserted  without difficulty . Complications None. The patient tolerated the procedure  well. The patient's rhythm is sinus  tachycardia. Adequate quality two-  dimensional was performed and interpreted. Adequate quality color and spectral  Doppler were performed and interpreted.     Left Ventricle  Left ventricular size is normal. Global and regional left ventricular function  is normal with an EF of 60-65%.     Right Ventricle  The right ventricle is normal size. Global right ventricular function is  normal.     Atria  Both atria appear normal. The left atrial appendage is normal. It is free of  spontaneous echo contrast and thrombus. No left atrial mass or thrombus  visualized. The atrial septum is intact as assessed by color Doppler .     Mitral Valve  The mitral valve is normal. Trace mitral insufficiency is present.     Aortic Valve  Aortic valve is normal in structure and function. The aortic valve is  tricuspid. No aortic regurgitation is present.     Tricuspid Valve  The tricuspid valve is normal. Trace tricuspid insufficiency is present.     Pulmonic Valve  The pulmonic valve is normal.     Vessels  The aorta root is normal.     Pericardium  No pericardial effusion is present.     Miscellaneous  Transgastric imaging was not performed.     ______________________________________________________________________________  Report approved by: Catherine Meraz 02/15/2022 03:20 PM     ______________________________________________________________________________

## 2022-09-15 NOTE — LETTER
9/15/2022       RE: Dave Calero  [address]  [place] [address]     Dear Colleague,    Thank you for referring your patient, Dave Calero, to the [facility] INFECTIOUS DISEASE CLINIC [place] [facility]. Please see a copy of my visit note below.    Dave Calero  is being evaluated via a billable video visit.      How would you like to obtain your AVS? [employer]  For the video visit, send the invitation by: Text to cell phone: [phone]  Will anyone else be joining your video visit? No      Video Start Time: 1;05 pm  Video-Visit Details    Type of service:  Video Visit    Video End Time: 1:14 pm  Originating Location (pt. Location):Home    Distant Location (provider location):  [facility] INFECTIOUS DISEASE CLINIC [place]     Platform used for Video Visit: [employer]      [facility]  Infectious Disease Clinic Note: Follow up     Patient:  Dave Calero, Date of birth 1970, Medical record number [id number]  Date of Visit: 09/15/22         Assessment and Recommendations:       Assessment:  ID Problem List:  1. Cervical discitis with epidural abscess  -s/p ACDF (2/9) and posterior instrmentation (2/10)  -Intraop cultures with MSSA, Cutibacterium  2. Granulicatella and MSSA bacteremia        Discussion:  50yo M initially admitted with cervical discitis and epidural abscess, s/p ACDF (2/9) and posterior instrumentation (2/10) with intraoperative cultures growing MSSA and Cutibacterium. Also had bacteremia (Granulicatella and MSSA).  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    First seen by me at ID clinic follow up  3/10/22. CRPS had trended down to normal. Pt with no obviouss concerns from an infection standpoint but some headache and difficulty swallowing after cracking his neck in bed one night. Unable to eat without laying down sometimes. Since these were new since postop period reached out to Ortho to check on these earlier than planned follow up.  We started Pantoprazole tablet daily to see if it helps reflux, until he can be seen by GI. Had speech eval and video swallow, Gi seen, they increased pantroprazole to 40, no improvement in symptoms, awaiting EGD. Ortho seen in interim, no concerns, xray done and reviewed by them.     6 weeks were to be completed 3/25, had a CRP elevation to 12 3/24, so repeated 3/28, and stopped IV Vanc 3/29 after this resulted normal. 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  Anticipated minimum 3 months of Doxycyline+Rifampin, extended another 3 months at 3 month follow up as he thought he was tolerating them and was ok continuing, given optimal duration unclear    Other than persistent difficulty swallowing, reflux he is doing well from infection standpoint. Some pain below surgical site over past 4 days, likely muscular. Labs done at last visit including CBC, CMP normal, CRP down to 0.2    There is no clear evidence for duration of chronic suppression, most people get 6 months to a year. He is at around 7 months from surgery now. Given GI issues, and in case medications are contributing to it, feel at this point would be reasonable to stop Rifampin. I do wonder if Doxy is contributing to his GERD, so will re-evaluate if no other cause found for GERD after GI eval. Pt reports reflux started much later after he started taking Doxy so he did not feel it is related likely.     Recommendations:  - Stop Rifampin  - Continue Doxycycline, we will plan to discuss one year vs indefinite at follow up  - Ensure to take Doxycycline with  plenty of water  - If back pain persists/worsens or if fevers/chills/sweats, advised to reach out to us/ortho to consider imaging  - Follow up with GI regarding any medication adjustment for GI symptoms  - Follow up with me in 3 months    Total time on day of visit including chart review, visit, counseling, documentation and coordination of care: 40 minutes    Nazario Tapia   of Medicine  Division of Infectious Diseases         Interval events     Had interval visit with Dr Calixto where surgical site looked ok. Some pain below surgical site for past 4 days, otherwise no new issues, no fevers, chills, has chronic sweats    Swallowing issues persist. GI seen, planned EGD pending. They had increased pantoprazole to 40 but no change in reflux symptoms. No weight loss. Unable to eat without laying down sometimes.     Reports tolerating antibiotics      No past medical history on file.    Past Surgical History:   Procedure Laterality Date     OPTICAL TRACKING SYSTEM FUSION POSTERIOR CERVICAL THREE + LEVELS N/A 2/12/2022    Procedure: Posterior instrumented spinal fusion cervical 5 to thoracic 2, with laminectomies at Cervical 5, Cervical 6, Smithe Tucker Oseotomy Cervical 5-6, Cervical 6-7 ;  Surgeon: Kulwinder Calixto MD;  Location:  OR     OPTICAL TRACKING SYSTEM FUSION POSTERIOR SPINE LUMBAR N/A 2/9/2022    Procedure: Anterior cervical diskectomy and fusion, cervical 5 to thoracic 1 (3 levels); right or anterior iliac crest autograft harvest. ;  Surgeon: Kulwinder Calixto MD;  Location: [facility] OR     TRANSESOPHAGEAL ECHOCARDIOGRAM INTRAOPERATIVE N/A 2/15/2022    Procedure: ECHOCARDIOGRAM, TRANSESOPHAGEAL (PREM), INTRAOPERATIVE;  Surgeon: GENERIC ANESTHESIA PROVIDER;  Location: [facility] OR       No family history on file.    Social History     Social History Narrative     Not on file     Social History     Tobacco Use     Smoking status: Former Smoker     Smokeless tobacco: Never Used        Immunization History   Administered Date(s) Administered     COVID-19,ULISES,Moderna 04/14/2021, 05/12/2021       Patient Active Problem List   Diagnosis     Discitis of cervical region     Epidural abscess     Hard to intubate     Cervical spondylosis with myelopathy     Encounter for screening laboratory testing for severe acute respiratory syndrome coronavirus 2 (SARS-CoV-2)     Dysphagia, unspecified type     Heartburn     Early satiety       Current Outpatient Medications   Medication Sig     acetaminophen (TYLENOL) 500 MG tablet Take 1-2 tablets (500-1,000 mg) by mouth every 6 hours as needed for mild pain     amphetamine-dextroamphetamine (ADDERALL XR) 30 MG 24 hr capsule Take 30 mg by mouth daily     baclofen (LIORESAL) 10 MG tablet Take 5-10 mg by mouth 3 times daily as needed for muscle spasms     cetirizine (ZYRTEC) 10 MG tablet Take 10 mg by mouth daily     diazepam (VALIUM) 5 MG tablet Take 1 tablet (5 mg) by mouth every 6 hours as needed for anxiety or muscle spasms     doxycycline hyclate (VIBRA-TABS) 100 MG tablet Take 1 tablet (100 mg) by mouth 2 times daily     FLUoxetine (PROZAC) 10 MG capsule Take 10 mg by mouth daily Take with the 20mg capsule for a total daily dose of 30mg     FLUoxetine (PROZAC) 20 MG capsule Take 20 mg by mouth daily Take with the 10mg capsule for a total daily dose of 30mg     ibuprofen (ADVIL/MOTRIN) 200 MG tablet Take 200 mg by mouth every 4 hours as needed for mild pain     omeprazole (PRILOSEC) 40 MG DR capsule Take 1 capsule (40 mg) by mouth daily     rifampin (RIFADIN) 300 MG capsule TAKE 2 CAPSULES BY MOUTH EVERY DAY     senna-docusate (SENOKOT-S/PERICOLACE) 8.6-50 MG tablet Take 1 tablet by mouth 2 times daily     camphor-menthol-methyl sal 4-10-30 % CREA Externally apply topically 3 times daily as needed (pain) (Patient not taking: No sig reported)     gabapentin (NEURONTIN) 300 MG capsule Take 300mg by mouth at bedtime for 2 days, then increase to 600mg at  bedtime daily (Patient not taking: No sig reported)     hydrOXYzine (ATARAX) 50 MG tablet Take 1 tablet (50 mg) by mouth every 6 hours as needed for itching or other (pain adjunct) (Patient not taking: No sig reported)     methocarbamol (ROBAXIN) 750 MG tablet Take 1 tablet (750 mg) by mouth 4 times daily as needed for muscle spasms (Patient not taking: No sig reported)     oxyCODONE (ROXICODONE) 5 MG tablet Take 1 tablet (5 mg) by mouth every 4 hours (Patient not taking: No sig reported)     pantoprazole (PROTONIX) 40 MG EC tablet Take 1 tablet (40 mg) by mouth daily (Patient not taking: No sig reported)     polyethylene glycol (MIRALAX) 17 g packet Take 17 g by mouth daily (Patient not taking: No sig reported)     VITAMIN D PO Take 1 tablet by mouth daily (Patient not taking: Reported on 9/15/2022)     No current facility-administered medications for this visit.       No Known Allergies           Physical Exam:     There were no vitals taken for this visit.    Limited video Exam:  GENERAL:  well-developed, well-nourished, alert, oriented, in no acute distress.  HEENT:  Head is normocephalic, atraumatic, collar now off  EYES:  Eyes have anicteric sclerae.    LUNGS:  Breathing comfortably  SKIN:  No acute rashes.    NEUROLOGIC:  Grossly nonfocal.         Laboratory Data:     Inflammatory Markers    Recent Labs   Lab Test 06/14/22  0920 03/28/22  1315 03/24/22  1830 03/17/22  1830 03/10/22  1815 03/03/22  1810 02/24/22  1900 02/17/22  0845   CRP 0.2 4.6 12.0* 3.3 4.9 6.1 14.0* 53.2*       Metabolic Studies       Recent Labs   Lab Test 06/14/22  0920 03/24/22  1830 03/17/22  1830 03/10/22  1815 03/03/22  1810 02/24/22  1900 02/15/22  1211 02/14/22  1803 02/13/22  0634 02/12/22  1153    140 139 138 140 139   < >  --    < > 138   POTASSIUM 4.0 3.7 4.0 4.3 4.2 4.2   < >  --    < > 3.8   CHLORIDE 105 107 107 105 107 106   < >  --    < >  --    CO2 30 28 29 29 28 28   < >  --    < >  --    ANIONGAP 4* 5 3 4 5 5   < >   --    < >  --    BUN 19 13 12 15 10 13   < >  --    < >  --    CR 0.81 0.86 0.77 0.76 0.72 0.68   < >  --    < >  --    GFRESTIMATED >90 >90 >90 >90 >90 >90   < >  --    < >  --     94 90 78 98 122*   < >  --    < > 117*   VIANEY 9.2 8.9 8.9 8.8 9.0 8.6   < >  --    < >  --    LACT  --   --   --   --   --   --   --  1.3  --  0.8    < > = values in this interval not displayed.       Hematology Studies      Recent Labs   Lab Test 06/14/22  0920 03/24/22  1830 03/17/22  1830 03/10/22  1815 03/03/22  1810 02/24/22  1900   WBC 6.9 7.4 7.0 6.0 7.9 9.2   HGB 15.3 11.9* 12.6* 12.0* 11.3* 10.4*   HCT 44.8 35.8* 38.8* 36.8* 36.0* 33.2*    312 314 353 476* 515*       Clotting Studies    Recent Labs   Lab Test 02/09/22  1438   INR 0.98       Urine Studies     Recent Labs   Lab Test 02/10/22  1918   URINEPH 5.5   NITRITE Negative   LEUKEST Negative   WBCU 1       Imaging:  Results for orders placed or performed during the hospital encounter of 02/09/22   XR Surgery DEVORA Fluoro L/T 5 Min w Stills    Narrative    Exam: XR SURGERY DEVROA FLUORO LESS THAN 5 MIN W STILLS, 2/9/2022 11:33  PM    Provided History: Anterior cervical discectomy and fusion, possible  anterior plate fixation C5-T1, right or left anterior iliac crest  autograft harvest  ICD-10:    Comparison: None.    Technique: Intraoperative imaging.    Findings:    A single lateral intraoperative image from 11:13 PM 2/9/2022 shows  linear surgical probe tip projecting over the anterior aspect of the  inferior endplate of C6.    Additional surgical clamp projects over the C6-7 intervertebral disc  space. Endotracheal tube is partially imaged.       Impression    Impression: Instrumentation projecting toward anterior aspect of the  inferior endplate of C6.    The above indicated surgical level was reported to operating room  personnel, specifically Dr Sembrano, via telephone by Dr Andujar  on 2/9/2022 11:33 PM.    I have personally reviewed the examination and  initial interpretation  and I agree with the findings.    LAUREN MEAD MD         SYSTEM ID:  [id number]   MR Cervical Spine w/o & w Contrast    Narrative    MR CERVICAL SPINE W/O & W CONTRAST 2/10/2022 3:00 PM    Provided History: Epidural abscess; s/p acdf  MR CERVICAL SPINE W/O & W CONTRAST 2/10/2022 3:00 PM    Provided History: Epidural abscess; s/p acdf    Comparison: Same day CT cervical spine    Technique: Sagittal T1-weighted, sagittal T2-weighted, sagittal  diffusion weighted, axial T2-weighted images of the cervical spine  were obtained without intravenous contrast. Following intravenous  administration of gadolinium, axial and sagittal T1-weighted images  with fat saturation were also obtained.    Contrast: 7.5ML iv Gadavist    Findings:  Minimal anterolisthesis C2-3.    Postsurgical changes of anterior fusion with interbody devices at  C5-6, C7-T1, and T1-2.      Bone marrow edema and associated enhancement involving the C6-T2  vertebral bodies. Bone marrow edema extends into T1 and T2 posterior  elements    Extensive retropharyngeal/prevertebral soft tissue edema and  enhancement extending from C5 down to T3 level. Anterior and posterior  epidural T2 hyperintense signal with associated enhancement extending  from C5-6 down to T2-3. Edema and enhancement extends into bilateral  neural foramina from C6 to T2. Associated severe spinal canal stenosis  at C6-7 at C7-T1. Edema and associated enhancement in the interspinous  region at C7-T1 and T1-T2 levels. Moderate spinal canal stenosis C5-6.  No definite epidural fluid collection.    Multilevel disc height narrowing and disc desiccation. On a level by  level basis;    C2-3: Bilateral uncovertebral spurring and facet hypertrophy resulting  in mild bilateral neural stenosis. No spinal canal stenosis.    C3-4: Disc osteophyte complex and bilateral uncinate spurring,  eccentric to the left. Left greater than right facet hypertrophy.  Severe left neural  foraminal stenosis. Mild right neural foraminal  stenosis. No spinal canal stenosis.    C4-5: Uncovertebral spurring in the right greater than left facet  hypertrophy. Mild to moderate right and mild left neural foraminal  stenosis. No spinal canal stenosis.    C5-6: Fusion level. Severe bilateral neural foraminal and moderate  spinal canal stenosis secondary to uncovertebral spurring and  superimposed epidural inflammatory findings.    C6-7: Disc osteophyte complex and bilateral uncinate spurring.  Bilateral facet hypertrophy. Epidural inflammatory/infectious findings  contribute to severe bilateral neural foraminal and severe spinal  canal stenosis.     C7-T1: Fusion level. Epidural inflammatory/infectious findings  contribute to severe bilateral neural foraminal and severe spinal  canal stenosis      Impression    Impression:   1. Early postsurgical changes of instrumented posterior spinal fusion  with interbody devices at C5-C6, C7-T1 and T1-T2.   2. Bone marrow edema and associated enhancement involving the C6-T2  vertebrae, consistent with osteomyelitis. Epidural  phlegmon extending  from phlegmon C5-6 down to T2-3. There are several questionable tiny  fluid pockets pockets, for example in the anterior epidural space at  C6 level. However it is mostly phlegmon. Edema and enhancement extends  into bilateral neural foramina from C6 to T2. Associated severe spinal  canal stenosis at C6-7 at C7-T1 and moderate spinal canal stenosis  C5-6.  3. Extensive retropharyngeal/prevertebral soft tissue edema and  enhancement extending from C5 down to T3 level. This is combination of  postsurgical changes and inflammatory/infectious findings.     I have personally reviewed the examination and initial interpretation  and I agree with the findings.    ANDRE ESTRADA MD         SYSTEM ID:  [id number]   CT Cervical Spine w/o Contrast    Narrative    CT CERVICAL SPINE W/O CONTRAST 2/10/2022 12:06 PM    Provided History: Epidural  abscess; s/p anterior fusion; to OR Friday  or Saturday for posterior approach    Comparison: None available    Technique: Using multidetector thin collimation helical acquisition  technique, axial, coronal and sagittal CT images through the cervical  spine were obtained without intravenous contrast.     Findings:  Postsurgical changes of anterior cervical discectomy and fusion with  interbody devices at C5-6, C7-T1 and T1-2. Associated postoperative  prevertebral soft tissue edema and emphysema. The cervical vertebrae  are normally aligned. Trace anterolisthesis of C3 on C4. Straightening  of the expected cervical lordosis. No acute fracture or subluxation.  Heterogeneous sclerotic changes of the C5 and C6 vertebral bodies.  Multilevel degenerative changes with osteophytic and uncovertebral  spurring, facet arthropathy, and moderate disc space narrowing at C6-7  The findings on a level by level basis are as follows:    C2-3:  Bilateral uncovertebral and facet hypertrophy, left greater  than right. Mild left neuroforaminal narrowing. No right  neuroforaminal or spinal canal stenosis.    C3-4:  Disc osteophyte complex. Bilateral uncovertebral and facet  hypertrophy, left greater than right. Mild to moderate left  neuroforaminal narrowing. No right neuroforaminal or spinal canal  stenosis    C4-5:  Bilateral uncovertebral and facet hypertrophy. No spinal canal  or neural foraminal stenosis.    C5-6:  Bilateral uncovertebral and facet hypertrophy, right greater  the left. Moderate bilateral neural foraminal narrowing. No spinal  canal stenosis.    C6-7:  Disc osteophyte complex. Bilateral uncovertebral and facet  hypertrophy. Mild-to-moderate bilateral neural foraminal narrowing. No  spinal canal stenosis.    C7-T1:  No spinal canal or neural foraminal stenosis.       Impression    Impression:   1. Postsurgical changes of anterior cervical discectomy and fusion  C5-6 and C7-T2 with associated postoperative prevertebral  soft tissue  edema and emphysema. Consider MRI for evaluation of soft tissue  abscess and osteomyelitis.  2. Multilevel cervical spondylosis as described above.    I have personally reviewed the examination and initial interpretation  and I agree with the findings.    HOME ROSE MD         SYSTEM ID:  [id number]   XR Surgery DEVORA L/T 5 Min Fluoro w Stills    Narrative    This exam was marked as non-reportable because it will not be read by a   radiologist or a [place] non-radiologist provider.         XR Cervical Spine 2/3 Views    Narrative    XR CERVICAL SPINE 2/3 VWS 2022 8:29 AM    Provided History: status post Anterior and posterior cervical fusion   ICD-10:    Comparison: MRI cervical spine 2/10/2022. CT cervical spine 2/10/2022.    Findings: AP and lateral views of the cervical spine were obtained.  Posterior fusion instrumentation from C5-T2. Intervening lower  cervical laminectomies. Interbody spacers at C5-6, C7-T1, and T1-2.  Straightening of cervical lordosis. Focal kyphosis at C7-T1 and grade  1 anterolisthesis of C7 on T1. Moderate prevertebral edema. Multilevel  degenerative changes and cutting endplate osteophytosis, uncinate  hypertrophy, and facet hypertrophy.    No mass in the visualized lung apices.      Impression    Impression:  1. New posterior fusion instrumentation from C5-T2.  2. Persistent changes of ACDF at C5-6, C7-T1, and T1-2.  3. Persistent prevertebral edema.         DIANDRA BROWN MD         SYSTEM ID:  [id number]   Echo Complete     Value    LVEF  55-60%    Narrative    [id number]  [id number]  [id number]  [id number]^[place]^CARLO^LANA     [facility]  Echocardiography Laboratory  [address] [place] [id number]     Name: SONYA ARAUJO  MRN: [id number]  : 1970  Study Date: 2022 02:35 PM  Age: 51 yrs  Gender: Male  Patient Location: [facility]  Reason For Study: Endocarditis  Ordering Physician: CARLO GOFF  LANA  Performed By: Rachel Dunbar JAH     BSA: 1.9 m2  Height: 69 in  Weight: 174 lb  HR: 109  BP: 135/80 mmHg  ______________________________________________________________________________  Procedure  Complete Portable Echo Adult. Contrast Optison. Patient was given 5 ml mixture  of 3 ml Optison and 6 ml saline. 4 ml wasted.  ______________________________________________________________________________  Interpretation Summary  Global and regional left ventricular function is normal with an EF of 55-60%.  The right ventricle is normal in function and size.  No significant valvular abnormality.  No pericardial effusion is present.  IVC diameter <2.1 cm collapsing >50% with sniff suggests a normal RA pressure  of 3 mmHg.  There is no prior study for direct comparison.  ______________________________________________________________________________  Left Ventricle  Left ventricular size is normal. Global and regional left ventricular function  is normal with an EF of 55-60%. Thickening of the anterobasal septum is  present. Diastolic function not assessed due to tachycardia. No regional wall  motion abnormalities are seen.     Right Ventricle  The right ventricle is normal size. Global right ventricular function is  normal.     Atria  Both atria appear normal.     Mitral Valve  The mitral valve is normal. Trace mitral insufficiency is present.     Tricuspid Valve  The tricuspid valve is normal. Trace tricuspid insufficiency is present. The  peak velocity of the tricuspid regurgitant jet is not obtainable.     Pulmonic Valve  On Doppler interrogation, there is no significant stenosis or regurgitation.  The valve leaflets are not well visualized.     Vessels  The aorta root is normal. The thoracic aorta is normal. IVC diameter <2.1 cm  collapsing >50% with sniff suggests a normal RA pressure of 3 mmHg.     Pericardium  No pericardial effusion is present.     Compared to Previous Study  There is no prior study  for direct comparison.     ______________________________________________________________________________  MMode/2D Measurements & Calculations  IVSd: 1.3 cm  LVIDd: 3.9 cm  LVIDs: 3.1 cm  LVPWd: 0.82 cm  FS: 21.3 %     LV mass(C)d: 132.4 grams  LV mass(C)dI: 68.0 grams/m2  Ao root diam: 2.9 cm  asc Aorta Diam: 2.7 cm  LVOT diam: 2.0 cm  LVOT area: 3.1 cm2  LA Volume (BP): 24.0 ml  LA Volume Index (BP): 12.3 ml/m2  RWT: 0.42     Doppler Measurements & Calculations  PA acc time: 0.08 sec     ______________________________________________________________________________  Report approved by: Farhan Stevenson 2022 03:30 PM         Transesophageal Echocardiogram     Value    LVEF  60-65%    Narrative    [id number]  [facility]  [id number]  [id number]^[place]^CARLO^LANA     [facility]  Echocardiography Laboratory  [address] [place] [id number]     Name: SONYA ARAUJO  MRN: [id number]  : 1970  Study Date: 02/15/2022 01:42 PM  Age: 51 yrs  Gender: Male  Patient Location: [facility]  Reason For Study: 2nd degree AV Block, Endocarditis  Ordering Physician: CARLO GOFF  Performed By: Son Vale     Attestation  I was present during PREM probe placement by the fellow, Son Vale. I  personally viewed the imaging and agree with the interpretation and report as  documented by the fellow.  ______________________________________________________________________________  Interpretation Summary  Normal biventricular function.  No valvular abnormalities or vegetations noted.  No pericardial effusion present.  ______________________________________________________________________________  Procedure  Intraoperative Transesophageal Echocardiogram with color and spectral Doppler  performed. 3D image acquisition, reconstruction, and real-time interpretation  was performed. Procedure location Operating Room. Informed consent for  Transesophegeal echo  obtained. PREM Probe #[id number] was used during the procedure.  Sedation, endotracheal intubation, and mechanical ventilation were initiated  prior to the PREM and were monitored by anesthesia. The Transducer was inserted  without difficulty . Complications None. The patient tolerated the procedure  well. The patient's rhythm is sinus tachycardia. Adequate quality two-  dimensional was performed and interpreted. Adequate quality color and spectral  Doppler were performed and interpreted.     Left Ventricle  Left ventricular size is normal. Global and regional left ventricular function  is normal with an EF of 60-65%.     Right Ventricle  The right ventricle is normal size. Global right ventricular function is  normal.     Atria  Both atria appear normal. The left atrial appendage is normal. It is free of  spontaneous echo contrast and thrombus. No left atrial mass or thrombus  visualized. The atrial septum is intact as assessed by color Doppler .     Mitral Valve  The mitral valve is normal. Trace mitral insufficiency is present.     Aortic Valve  Aortic valve is normal in structure and function. The aortic valve is  tricuspid. No aortic regurgitation is present.     Tricuspid Valve  The tricuspid valve is normal. Trace tricuspid insufficiency is present.     Pulmonic Valve  The pulmonic valve is normal.     Vessels  The aorta root is normal.     Pericardium  No pericardial effusion is present.     Miscellaneous  Transgastric imaging was not performed.     ______________________________________________________________________________  Report approved by: Catherine Meraz 02/15/2022 03:20 PM     ______________________________________________________________________________          Nazario Tapia MD

## 2022-09-15 NOTE — PATIENT INSTRUCTIONS
- Stop Rifampin  - Continue Doxycycline, we will plan to discuss one year vs indefinite at follow up  - Please ensure to take this medication with plenty of water  - If back pain persists/worsens or you develop fevers/chills/sweats, please reach out to us/ortho to consider imaging  - Follow up with GI regarding any medication adjustment for your GI symptoms  - Follow up with me in 3 months

## 2022-09-25 ENCOUNTER — HEALTH MAINTENANCE LETTER (OUTPATIENT)
Age: 52
End: 2022-09-25

## 2022-09-28 NOTE — PROGRESS NOTES
This is a recent snapshot of the patient's [facility] medical record.  For current drug dose and complete information and questions, call [phone] or In Basket pool, fv home infusion ([id number])  CSN Number:  [id number]

## 2022-10-03 NOTE — PROGRESS NOTES
This is a recent snapshot of the patient's [facility] medical record.  For current drug dose and complete information and questions, call [phone] or In Basket pool, fv home infusion ([id number])  CSN Number:  [id number]

## 2022-10-04 ENCOUNTER — TELEPHONE (OUTPATIENT)
Dept: GASTROENTEROLOGY | Facility: CLINIC | Age: 52
End: 2022-10-04

## 2022-10-04 NOTE — TELEPHONE ENCOUNTER
----- Message from Tatum Larios RN sent at 10/4/2022  2:06 PM CDT -----  Regarding: 10.12.2022 EGD  Endo scheduling,    Pt is scheduled for an EGD on 10.12.2022 at [facility] with MAC.    Pt has a difficult airway/intubation please r/s to [facility].    Thank you,  Tatum Larios RN  Pre-Assessment Endoscopy

## 2022-10-04 NOTE — TELEPHONE ENCOUNTER
Caller: Dave Calero     Procedure: EGD     Date, Location, and Surgeon of Procedure Cancelled: 10/13/2022 - [facility]  - [facility]     Ordering Provider:BENNIE     Reason for cancel (please be detailed, any staff messages or encounters to note?):     PER GI RN -- PRE ASSESSMENT PT NEEDED TO SHIFT TO [facility] DUE TO DIFFICULT AIRWAY         Rescheduled: YES      If rescheduled:    Date: 10/13/2022    Location: [facility]    Prep Resent: YES (changes to prep?)   Covid Test Rescheduled: **INFORMED OF HOME TESTING & LAB OPTION**     Note any change or update to original order/sedation: N/A

## 2022-10-05 ENCOUNTER — TELEPHONE (OUTPATIENT)
Dept: GASTROENTEROLOGY | Facility: CLINIC | Age: 52
End: 2022-10-05

## 2022-10-05 NOTE — TELEPHONE ENCOUNTER
FUTURE VISIT INFORMATION      SURGERY INFORMATION:    Date: 10/13/22    Location:  gi    Surgeon:  Akua Maldonado MD    Anesthesia Type:  MAC    Procedure: ESOPHAGOGASTRODUODENOSCOPY (EGD)  RECORDS REQUESTED FROM:       Primary Care Provider: Roseanne    Pertinent Medical History: Hard to intubate    Most recent EKG+ Tracin22    Most recent ECHO: 22

## 2022-10-05 NOTE — TELEPHONE ENCOUNTER
RN confirmed with Dr. Hook that patient is okay to proceed with EGD with Dr. Akua Maldonado (as GI order specified Dr. Hook).    Dr. Hook gave approval to keep scheduled appt with Dr. Akua Maldonado.    Tatum Larios RN

## 2022-10-05 NOTE — TELEPHONE ENCOUNTER
Pre assessment questions completed for upcoming EGD procedure scheduled on 10.13.2022    Discussed at home rapid antigen COVID test 1-2 days prior to procedure.     Pre-op: 10.11.2022 PAC with Arline Alston APRN CNP    Reviewed procedural arrival time 0745 and facility location [facility].    Designated  policy reviewed. Instructed to have someone stay 24 hours post procedure.     Anticoagulation/blood thinners? no    Electronic implanted devices? no    Reviewed EGD prep instructions with patient.     Patient verbalized understanding and had no questions or concerns at this time.    Tatum Larios RN

## 2022-10-11 ENCOUNTER — ANESTHESIA EVENT (OUTPATIENT)
Dept: GASTROENTEROLOGY | Facility: CLINIC | Age: 52
End: 2022-10-11
Payer: COMMERCIAL

## 2022-10-11 ENCOUNTER — PRE VISIT (OUTPATIENT)
Dept: SURGERY | Facility: CLINIC | Age: 52
End: 2022-10-11

## 2022-10-11 ENCOUNTER — VIRTUAL VISIT (OUTPATIENT)
Dept: SURGERY | Facility: CLINIC | Age: 52
End: 2022-10-11
Payer: COMMERCIAL

## 2022-10-11 DIAGNOSIS — R68.81 EARLY SATIETY: ICD-10-CM

## 2022-10-11 DIAGNOSIS — R13.10 DYSPHAGIA, UNSPECIFIED TYPE: ICD-10-CM

## 2022-10-11 DIAGNOSIS — Z01.818 PREOP EXAMINATION: Primary | ICD-10-CM

## 2022-10-11 DIAGNOSIS — K21.9 GASTROESOPHAGEAL REFLUX DISEASE WITHOUT ESOPHAGITIS: ICD-10-CM

## 2022-10-11 PROCEDURE — 99204 OFFICE O/P NEW MOD 45 MIN: CPT | Mod: GT | Performed by: NURSE PRACTITIONER

## 2022-10-11 ASSESSMENT — COPD QUESTIONNAIRES: COPD: 0

## 2022-10-11 ASSESSMENT — LIFESTYLE VARIABLES: TOBACCO_USE: 1

## 2022-10-11 ASSESSMENT — ENCOUNTER SYMPTOMS: ORTHOPNEA: 0

## 2022-10-11 ASSESSMENT — PAIN SCALES - GENERAL: PAINLEVEL: NO PAIN (0)

## 2022-10-11 NOTE — PATIENT INSTRUCTIONS
Name:  Dave Calero   MRN:  [id number]   :  1970   Today's Date:  10/11/2022     GI Lab procedures:    A representative from the GI Lab will contact you regarding arrival date and time.      You were seen today in the PAC Clinic.   (Pre-operative Anesthesia Assessment Center)  [place] [id number]   phone [phone]    You had a pre-operative assessment done.    Anesthesia recommendations for medications:    Hold Aspirin for 2 days before procedure.  Hold Multivitamins for 7 days before procedure.   Hold Herbal medications and Supplements for 7 days before procedure.  Hold Ibuprofen for 2 days before procedure.   Hold Naproxen for 2 days before procedure.       Please hold the following medications the day of procedure:  Adderall          Please take these medications the day of procedure:  Acetaminophen (tylenol) if needed  Cetirizine  Doxycycline  Fluoxetine          For questions or appointments, call:  [facility] Endoscopy: [phone], option 2.  Monday through Friday, 8 a.m. to 4:30 p.m.  (If it is after hours, please reach out to the clinic or provider you were scheduled with.)

## 2022-10-11 NOTE — H&P (VIEW-ONLY)
Pre-Operative H & P     CC:  Preoperative exam to assess for increased cardiopulmonary risk while undergoing surgery and anesthesia.    Date of Encounter: 10/11/2022  Primary Care Physician:  Maurisio Araiza     Reason for visit:   Encounter Diagnoses   Name Primary?     Preop examination Yes     Gastroesophageal reflux disease without esophagitis      Dysphagia, unspecified type      Early satiety        HPI  Dave Calero is a 51 year old male who presents for pre-operative H & P in preparation for  Procedure Information     Case: [id number] Date/Time: 10/13/22 0915    Procedure: ESOPHAGOGASTRODUODENOSCOPY (EGD) (Esophagus) - Dysphagia, unspecified type [R13.10]  Heartburn [R12]  Early satiety [R68.81]    Anesthesia type: MAC    Diagnosis:       Dysphagia, unspecified type [R13.10]      Heartburn [R12]      Early satiety [R68.81]    Pre-op diagnosis:       Dysphagia, unspecified type [R13.10]      Heartburn [R12]      Early satiety [R68.81]    Location:  GI 02 /  GI    Providers: Akua Maldonado MD          Dave Calero is a 51 year old male with allergic rhinitis, ADHD, anxiety and neck pain that has dysphagia, globus sensation, GERD, early satiety, heartburn and food regurgitation.  He has a history of cervical discitis with epidural abscess (C5-T1) secondary to MSSA and cutibacterium ultimately requiring anterior cervical discectomy with fusion and posterior instrumentation on 2/9 and 2/10/2022 as well as granulicatella and MSSA bacteremia.  He reports that since being hospitalized for that issue he has been having the above listed GI symptoms.  He has tried omeprazole and protonix, but stopped taking both as they weren't helpful.  He was subsequently referred to GI for evaluation.  He consulted with Dr. Hoover and the above listed procedure has now been recommended for further evaluation.      History is obtained from the patient and chart review    Hx of abnormal  bleeding or anti-platelet use: none      Past Medical History  Past Medical History:   Diagnosis Date     ADHD (attention deficit hyperactivity disorder)      Anesthesia complication     post-op paranoia     Anxiety and depression      Gastroesophageal reflux disease without esophagitis      Seasonal allergic rhinitis        Past Surgical History  Past Surgical History:   Procedure Laterality Date     OPTICAL TRACKING SYSTEM FUSION POSTERIOR CERVICAL THREE + LEVELS N/A 2/12/2022    Procedure: Posterior instrumented spinal fusion cervical 5 to thoracic 2, with laminectomies at Cervical 5, Cervical 6, Smithe Tucker Oseotomy Cervical 5-6, Cervical 6-7 ;  Surgeon: Kulwinder Calixto MD;  Location: [facility] OR     OPTICAL TRACKING SYSTEM FUSION POSTERIOR SPINE LUMBAR N/A 2/9/2022    Procedure: Anterior cervical diskectomy and fusion, cervical 5 to thoracic 1 (3 levels); right or anterior iliac crest autograft harvest. ;  Surgeon: Kulwinder Calixto MD;  Location: [facility] OR     TRANSESOPHAGEAL ECHOCARDIOGRAM INTRAOPERATIVE N/A 2/15/2022    Procedure: ECHOCARDIOGRAM, TRANSESOPHAGEAL (PREM), INTRAOPERATIVE;  Surgeon: GENERIC ANESTHESIA PROVIDER;  Location: [facility] OR       Prior to Admission Medications  Current Outpatient Medications   Medication Sig Dispense Refill     acetaminophen (TYLENOL) 500 MG tablet Take 1-2 tablets (500-1,000 mg) by mouth every 6 hours as needed for mild pain 40 tablet 0     amphetamine-dextroamphetamine (ADDERALL XR) 30 MG 24 hr capsule Take 1 capsule (30 mg) by mouth every morning       cetirizine (ZYRTEC) 10 MG tablet Take 1 tablet (10 mg) by mouth every morning       doxycycline hyclate (VIBRA-TABS) 100 MG tablet Take 1 tablet (100 mg) by mouth 2 times daily 180 tablet 1     FLUoxetine (PROZAC) 10 MG capsule Take 1 capsule (10 mg) by mouth every morning Take with the 20mg capsule for a total daily dose of 30mg       FLUoxetine (PROZAC) 20 MG capsule Take 20 mg by mouth daily Take with the 10mg  capsule for a total daily dose of 30mg       ibuprofen (ADVIL/MOTRIN) 200 MG tablet Take 200 mg by mouth every 4 hours as needed for mild pain       VITAMIN D PO Take 1 tablet by mouth daily (Patient not taking: Reported on 9/15/2022)         Allergies  No Known Allergies    Social History  Social History     Socioeconomic History     Marital status:      Spouse name: Not on file     Number of children: 2     Years of education: Not on file     Highest education level: Not on file   Occupational History     Occupation: property  management   Tobacco Use     Smoking status: Former     Packs/day: 0.50     Years: 20.00     Pack years: 10.00     Types: Cigarettes     Quit date:      Years since quittin.7     Smokeless tobacco: Never   Substance and Sexual Activity     Alcohol use: Not Currently     Drug use: Not Currently     Sexual activity: Not on file   Other Topics Concern     Parent/sibling w/ CABG, MI or angioplasty before 65F 55M? Not Asked   Social History Narrative     Not on file     Social Determinants of Health     Financial Resource Strain: Not on file   Food Insecurity: Not on file   Transportation Needs: Not on file   Physical Activity: Not on file   Stress: Not on file   Social Connections: Not on file   Intimate Partner Violence: Not on file   Housing Stability: Not on file       Family History  Family History   Problem Relation Age of Onset     Colon Cancer Father      Anesthesia Reaction No family hx of      Thrombosis No family hx of        Review of Systems  The complete review of systems is negative other than noted in the HPI or here.   Anesthesia Evaluation   Pt has had prior anesthetic.     History of anesthetic complications  - difficult airway.  paranoia.    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  Is able to walk several blocks continuously.     Hematologic:  - neg hematologic  ROS  (-) history of blood clots and history of blood transfusion   Musculoskeletal: Comment: Admitted 2/2020 for cervical discitis and abscess.  S/p surgery and IV antibiotics.  Rifampin has been discontinued.  Continues on doxycycline for prophylaxis. Slightly limited neck ROM.     Chronic neck pain - ID aware, unknown etiology for this pain      Right wrist ROM limitations s/p surgery in July 2022 - no brace or splint      GI/Hepatic: Comment: Dysphagia  Heartburn   Early satiety  Regurgitation  Globus sensation    (+) GERD, Symptomatic,     Renal/Genitourinary:  - neg Renal ROS     Endo:  - neg endo ROS     Psychiatric/Substance Use:     (+) psychiatric history anxiety and other (comment) (ADHD)     Infectious Disease: Comment: See Musculoskeletal notes - neg infectious disease ROS  (-) Recent Fever   Malignancy:  - neg malignancy ROS     Other:  - neg other ROS    (+) , no H/O Chronic Pain,        Virtual visit -  No vitals were obtained    Physical Exam  Constitutional: Awake, alert, cooperative, no apparent distress, and appears stated age.  Eyes: Pupils equal  HENT: Normocephalic  Respiratory: non labored breathing   Neurologic: Awake, alert, oriented to name, place and time.   Neuropsychiatric: Calm, cooperative. Normal affect.      Prior Labs/Diagnostic Studies   All labs and imaging personally reviewed     Echocardiogram  2/2022  Interpretation Summary  Normal biventricular function.  No valvular abnormalities or vegetations noted.  No pericardial effusion present.    Procedure  Intraoperative Transesophageal  Echocardiogram with color and spectral Doppler  performed. 3D image acquisition, reconstruction, and real-time interpretation  was performed. Procedure location Operating Room. Informed consent for  Transesophegeal echo obtained. PREM Probe #63 was used during the procedure.  Sedation, endotracheal intubation, and mechanical ventilation were initiated  prior to the PREM and were monitored by anesthesia. The Transducer was inserted  without difficulty . Complications None. The patient tolerated the procedure  well. The patient's rhythm is sinus tachycardia. Adequate quality two-  dimensional was performed and interpreted. Adequate quality color and spectral  Doppler were performed and interpreted.     Left Ventricle  Left ventricular size is normal. Global and regional left ventricular function  is normal with an EF of 60-65%.      EKG  2/2022   sinus tachycardia       Component      Latest Ref Rng & Units 6/14/2022   WBC      4.0 - 11.0 10e3/uL 6.9   RBC Count      4.40 - 5.90 10e6/uL 5.49   Hemoglobin      13.3 - 17.7 g/dL 15.3   Hematocrit      40.0 - 53.0 % 44.8   MCV      78 - 100 fL 82   MCH      26.5 - 33.0 pg 27.9   MCHC      31.5 - 36.5 g/dL 34.2   RDW      10.0 - 15.0 % 13.3   Platelet Count      150 - 450 10e3/uL 269   % Neutrophils      % 48   % Lymphocytes      % 35   % Monocytes      % 9   % Eosinophils      % 5   % Basophils      % 2   % Immature Granulocytes      % 1   NRBCs per 100 WBC      <1 /100 0   Absolute Neutrophils      1.6 - 8.3 10e3/uL 3.4   Absolute Lymphocytes      0.8 - 5.3 10e3/uL 2.4   Absolute Monocytes      0.0 - 1.3 10e3/uL 0.6   Absolute Eosinophils      0.0 - 0.7 10e3/uL 0.4   Absolute Basophils      0.0 - 0.2 10e3/uL 0.1   Absolute Immature Granulocytes      <=0.4 10e3/uL 0.0   Absolute NRBCs      10e3/uL 0.0   Sodium      136 - 145 mmol/L 139   Potassium      3.5 - 5.0 mmol/L 4.0   Chloride      98 - 107 mmol/L 105   Carbon Dioxide      22 - 31 mmol/L 30   Anion Gap      5 - 18  mmol/L 4 (L)   Urea Nitrogen      8 - 22 mg/dL 19   Creatinine      0.70 - 1.30 mg/dL 0.81   Calcium      8.5 - 10.5 mg/dL 9.2   Glucose      70 - 125 mg/dL 110   Alkaline Phosphatase      45 - 120 U/L 109   AST      0 - 40 U/L 13   ALT      0 - 45 U/L 22   Protein Total      6.0 - 8.0 g/dL 7.0   Albumin      3.5 - 5.0 g/dL 3.8   Bilirubin Total      0.0 - 1.0 mg/dL 0.4   GFR Estimate      >60 mL/min/1.73m2 >90       The patient's records and results personally reviewed by this provider.     Outside records reviewed from: Care Everywhere      Assessment      Dave Calero is a 51 year old male seen as a PAC referral for risk assessment and optimization for anesthesia.    Plan/Recommendations  Pt will be optimized for the proposed procedure.  See below for details on the assessment, risk, and preoperative recommendations    NEUROLOGY  - No history of TIA, CVA or seizure    -Post Op delirium risk factors:  No risk identified    ENT  **there is a notation on this patient's medical history list of possible difficult airway.  The patient has no recollection of ever being told this in the past.  His last three anesthesia records from [facility] note easy intubation.  Thinking it may be possible that someone put that on the medical history due to the possibility of issues with his cervical spine in Feb 2022 when he had discitis, but there are no notes to determine.**    Mallampati: Unable to assess  TM: Unable to assess    CARDIAC  - no noted cardiac history  - METS (Metabolic Equivalents) = >4  Patient performs 4 or more METS exercise without symptoms            Total Score: 0      RCRI-Very low risk: Class 1 0.4% complication rate            Total Score: 0        PULMONARY    BRETT Medium Risk            Total Score: 3    BRETT: Snores loudly    BRETT: Over 50 ys old    BRETT: Male      - Denies asthma or inhaler use  - Tobacco History      History   Smoking Status     Former     Packs/day: 0.50     Years: 20.00      "Types: Cigarettes     Quit date: 2016   Smokeless Tobacco     Never       GI  - has multiple GI symptoms as noted in ROS.  No current GI meds.  EGD planned as noted above.   PONV Medium Risk  Total Score: 2           1 AN PONV: Patient is not a current smoker    1 AN PONV: Intended Post Op Opioids            ENDOCRINE    - BMI: Estimated body mass index is 25.7 kg/m  as calculated from the following:    Height as of 2/9/22: 1.753 m (5' 9\").    Weight as of 2/9/22: 78.9 kg (174 lb).  Overweight (BMI 25.0-29.9)  - No history of Diabetes Mellitus    HEME  VTE Low Risk 0.5%            Total Score: 2    VTE: Male      - No history of abnormal bleeding or antiplatelet use.      MSK  - s/p cervical spine surgery and antibiotic treatment in Feb 2022 for cervical spine discitis and abscess. Has some ongoing neck pain below the level of surgery.  ID is aware.  Rifampin previously discontinued.  Continues on long term doxycycline for prophylaxis.  Consider cautious positioning.    - has some discomfort in the right wrist s/p surgery this past summer.  No splint or brace.  Consider cautious positioning.    PSYCH  - hold adderall DOS    ID  - as above in musculoskeletal    Anesthesia  - history of post-op paranoia.  See 2/9/22 anesthesia record.  Noted that midazolam was helpful.    - see note in ENT/airway above            The patient is optimized for their procedure. AVS with information on surgery time/arrival time, meds and NPO status given by nursing staff. No further diagnostic testing indicated.    Virtual visit - Please refer to the physical examination documented by the anesthesiologist in the anesthesia record on the day of surgery.    Video-Visit Details    Type of service:  Video Visit    Patient verbally consented to video service today: YES    Video Start Time: 0847  Video End Time (time video stopped): 0910    Originating Location (pt. Location): Parked in their car    Distant Location (provider location):  " provider's home    Mode of Communication:  Video Conference via [employer]       On the day of service:     Prep time: 10 minutes  Visit time: 23 minutes  Documentation time: 16 minutes  ------------------------------------------  Total time: 49 minutes      KYLAH Dorantes CNP  Preoperative Assessment Center  [facility]  [facility]  Phone: [phone]  Fax: [phone]

## 2022-10-11 NOTE — H&P
Pre-Operative H & P     CC:  Preoperative exam to assess for increased cardiopulmonary risk while undergoing surgery and anesthesia.    Date of Encounter: 10/11/2022  Primary Care Physician:  Maurisio Araiza     Reason for visit:   Encounter Diagnoses   Name Primary?     Preop examination Yes     Gastroesophageal reflux disease without esophagitis      Dysphagia, unspecified type      Early satiety        HPI  Dave Calero is a 51 year old male who presents for pre-operative H & P in preparation for  Procedure Information     Case: [id number] Date/Time: 10/13/22 0915    Procedure: ESOPHAGOGASTRODUODENOSCOPY (EGD) (Esophagus) - Dysphagia, unspecified type [R13.10]  Heartburn [R12]  Early satiety [R68.81]    Anesthesia type: MAC    Diagnosis:       Dysphagia, unspecified type [R13.10]      Heartburn [R12]      Early satiety [R68.81]    Pre-op diagnosis:       Dysphagia, unspecified type [R13.10]      Heartburn [R12]      Early satiety [R68.81]    Location:  [facility] 02 /  GI    Providers: Akua Maldonado MD          Dave Calero is a 51 year old male with allergic rhinitis, ADHD, anxiety and neck pain that has dysphagia, globus sensation, GERD, early satiety, heartburn and food regurgitation.  He has a history of cervical discitis with epidural abscess (C5-T1) secondary to MSSA and cutibacterium ultimately requiring anterior cervical discectomy with fusion and posterior instrumentation on 2/9 and 2/10/2022 as well as granulicatella and MSSA bacteremia.  He reports that since being hospitalized for that issue he has been having the above listed GI symptoms.  He has tried omeprazole and protonix, but stopped taking both as they weren't helpful.  He was subsequently referred to GI for evaluation.  He consulted with Dr. Hoover and the above listed procedure has now been recommended for further evaluation.      History is obtained from the patient and chart review    Hx of abnormal  bleeding or anti-platelet use: none      Past Medical History  Past Medical History:   Diagnosis Date     ADHD (attention deficit hyperactivity disorder)      Anesthesia complication     post-op paranoia     Anxiety and depression      Gastroesophageal reflux disease without esophagitis      Seasonal allergic rhinitis        Past Surgical History  Past Surgical History:   Procedure Laterality Date     OPTICAL TRACKING SYSTEM FUSION POSTERIOR CERVICAL THREE + LEVELS N/A 2/12/2022    Procedure: Posterior instrumented spinal fusion cervical 5 to thoracic 2, with laminectomies at Cervical 5, Cervical 6, Smithe Tucker Oseotomy Cervical 5-6, Cervical 6-7 ;  Surgeon: Kulwinder Calixto MD;  Location: [facility]     OPTICAL TRACKING SYSTEM FUSION POSTERIOR SPINE LUMBAR N/A 2/9/2022    Procedure: Anterior cervical diskectomy and fusion, cervical 5 to thoracic 1 (3 levels); right or anterior iliac crest autograft harvest. ;  Surgeon: Kulwinder Calixto MD;  Location: [facility] OR     TRANSESOPHAGEAL ECHOCARDIOGRAM INTRAOPERATIVE N/A 2/15/2022    Procedure: ECHOCARDIOGRAM, TRANSESOPHAGEAL (PREM), INTRAOPERATIVE;  Surgeon: GENERIC ANESTHESIA PROVIDER;  Location: [facility] OR       Prior to Admission Medications  Current Outpatient Medications   Medication Sig Dispense Refill     acetaminophen (TYLENOL) 500 MG tablet Take 1-2 tablets (500-1,000 mg) by mouth every 6 hours as needed for mild pain 40 tablet 0     amphetamine-dextroamphetamine (ADDERALL XR) 30 MG 24 hr capsule Take 1 capsule (30 mg) by mouth every morning       cetirizine (ZYRTEC) 10 MG tablet Take 1 tablet (10 mg) by mouth every morning       doxycycline hyclate (VIBRA-TABS) 100 MG tablet Take 1 tablet (100 mg) by mouth 2 times daily 180 tablet 1     FLUoxetine (PROZAC) 10 MG capsule Take 1 capsule (10 mg) by mouth every morning Take with the 20mg capsule for a total daily dose of 30mg       FLUoxetine (PROZAC) 20 MG capsule Take 20 mg by mouth daily Take with the 10mg  capsule for a total daily dose of 30mg       ibuprofen (ADVIL/MOTRIN) 200 MG tablet Take 200 mg by mouth every 4 hours as needed for mild pain       VITAMIN D PO Take 1 tablet by mouth daily (Patient not taking: Reported on 9/15/2022)         Allergies  No Known Allergies    Social History  Social History     Socioeconomic History     Marital status:      Spouse name: Not on file     Number of children: 2     Years of education: Not on file     Highest education level: Not on file   Occupational History     Occupation: property  management   Tobacco Use     Smoking status: Former     Packs/day: 0.50     Years: 20.00     Pack years: 10.00     Types: Cigarettes     Quit date:      Years since quittin.7     Smokeless tobacco: Never   Substance and Sexual Activity     Alcohol use: Not Currently     Drug use: Not Currently     Sexual activity: Not on file   Other Topics Concern     Parent/sibling w/ CABG, MI or angioplasty before 65F 55M? Not Asked   Social History Narrative     Not on file     Social Determinants of Health     Financial Resource Strain: Not on file   Food Insecurity: Not on file   Transportation Needs: Not on file   Physical Activity: Not on file   Stress: Not on file   Social Connections: Not on file   Intimate Partner Violence: Not on file   Housing Stability: Not on file       Family History  Family History   Problem Relation Age of Onset     Colon Cancer Father      Anesthesia Reaction No family hx of      Thrombosis No family hx of        Review of Systems  The complete review of systems is negative other than noted in the HPI or here.   Anesthesia Evaluation   Pt has had prior anesthetic.     History of anesthetic complications  - difficult airway.  paranoia.    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  Is able to walk several blocks continuously.     Hematologic:  - neg hematologic  ROS  (-) history of blood clots and history of blood transfusion   Musculoskeletal: Comment: Admitted 2/2020 for cervical discitis and abscess.  S/p surgery and IV antibiotics.  Rifampin has been discontinued.  Continues on doxycycline for prophylaxis. Slightly limited neck ROM.     Chronic neck pain - ID aware, unknown etiology for this pain      Right wrist ROM limitations s/p surgery in July 2022 - no brace or splint      GI/Hepatic: Comment: Dysphagia  Heartburn   Early satiety  Regurgitation  Globus sensation    (+) GERD, Symptomatic,     Renal/Genitourinary:  - neg Renal ROS     Endo:  - neg endo ROS     Psychiatric/Substance Use:     (+) psychiatric history anxiety and other (comment) (ADHD)     Infectious Disease: Comment: See Musculoskeletal notes - neg infectious disease ROS  (-) Recent Fever   Malignancy:  - neg malignancy ROS     Other:  - neg other ROS    (+) , no H/O Chronic Pain,        Virtual visit -  No vitals were obtained    Physical Exam  Constitutional: Awake, alert, cooperative, no apparent distress, and appears stated age.  Eyes: Pupils equal  HENT: Normocephalic  Respiratory: non labored breathing   Neurologic: Awake, alert, oriented to name, place and time.   Neuropsychiatric: Calm, cooperative. Normal affect.      Prior Labs/Diagnostic Studies   All labs and imaging personally reviewed     Echocardiogram  2/2022  Interpretation Summary  Normal biventricular function.  No valvular abnormalities or vegetations noted.  No pericardial effusion present.    Procedure  Intraoperative Transesophageal  Echocardiogram with color and spectral Doppler  performed. 3D image acquisition, reconstruction, and real-time interpretation  was performed. Procedure location Operating Room. Informed consent for  Transesophegeal echo obtained. PREM Probe #63 was used during the procedure.  Sedation, endotracheal intubation, and mechanical ventilation were initiated  prior to the PREM and were monitored by anesthesia. The Transducer was inserted  without difficulty . Complications None. The patient tolerated the procedure  well. The patient's rhythm is sinus tachycardia. Adequate quality two-  dimensional was performed and interpreted. Adequate quality color and spectral  Doppler were performed and interpreted.     Left Ventricle  Left ventricular size is normal. Global and regional left ventricular function  is normal with an EF of 60-65%.      EKG  2/2022   sinus tachycardia       Component      Latest Ref Rng & Units 6/14/2022   WBC      4.0 - 11.0 10e3/uL 6.9   RBC Count      4.40 - 5.90 10e6/uL 5.49   Hemoglobin      13.3 - 17.7 g/dL 15.3   Hematocrit      40.0 - 53.0 % 44.8   MCV      78 - 100 fL 82   MCH      26.5 - 33.0 pg 27.9   MCHC      31.5 - 36.5 g/dL 34.2   RDW      10.0 - 15.0 % 13.3   Platelet Count      150 - 450 10e3/uL 269   % Neutrophils      % 48   % Lymphocytes      % 35   % Monocytes      % 9   % Eosinophils      % 5   % Basophils      % 2   % Immature Granulocytes      % 1   NRBCs per 100 WBC      <1 /100 0   Absolute Neutrophils      1.6 - 8.3 10e3/uL 3.4   Absolute Lymphocytes      0.8 - 5.3 10e3/uL 2.4   Absolute Monocytes      0.0 - 1.3 10e3/uL 0.6   Absolute Eosinophils      0.0 - 0.7 10e3/uL 0.4   Absolute Basophils      0.0 - 0.2 10e3/uL 0.1   Absolute Immature Granulocytes      <=0.4 10e3/uL 0.0   Absolute NRBCs      10e3/uL 0.0   Sodium      136 - 145 mmol/L 139   Potassium      3.5 - 5.0 mmol/L 4.0   Chloride      98 - 107 mmol/L 105   Carbon Dioxide      22 - 31 mmol/L 30   Anion Gap      5 - 18  mmol/L 4 (L)   Urea Nitrogen      8 - 22 mg/dL 19   Creatinine      0.70 - 1.30 mg/dL 0.81   Calcium      8.5 - 10.5 mg/dL 9.2   Glucose      70 - 125 mg/dL 110   Alkaline Phosphatase      45 - 120 U/L 109   AST      0 - 40 U/L 13   ALT      0 - 45 U/L 22   Protein Total      6.0 - 8.0 g/dL 7.0   Albumin      3.5 - 5.0 g/dL 3.8   Bilirubin Total      0.0 - 1.0 mg/dL 0.4   GFR Estimate      >60 mL/min/1.73m2 >90       The patient's records and results personally reviewed by this provider.     Outside records reviewed from: Care Everywhere      Assessment      Dave Calero is a 51 year old male seen as a PAC referral for risk assessment and optimization for anesthesia.    Plan/Recommendations  Pt will be optimized for the proposed procedure.  See below for details on the assessment, risk, and preoperative recommendations    NEUROLOGY  - No history of TIA, CVA or seizure    -Post Op delirium risk factors:  No risk identified    ENT  **there is a notation on this patient's medical history list of possible difficult airway.  The patient has no recollection of ever being told this in the past.  His last three anesthesia records from [facility] note easy intubation.  Thinking it may be possible that someone put that on the medical history due to the possibility of issues with his cervical spine in Feb 2022 when he had discitis, but there are no notes to determine.**    Mallampati: Unable to assess  TM: Unable to assess    CARDIAC  - no noted cardiac history  - METS (Metabolic Equivalents) = >4  Patient performs 4 or more METS exercise without symptoms            Total Score: 0      RCRI-Very low risk: Class 1 0.4% complication rate            Total Score: 0        PULMONARY    BRETT Medium Risk            Total Score: 3    BRETT: Snores loudly    BRETT: Over 50 ys old    BRETT: Male      - Denies asthma or inhaler use  - Tobacco History      History   Smoking Status     Former     Packs/day: 0.50     Years: 20.00      "Types: Cigarettes     Quit date: 2016   Smokeless Tobacco     Never       GI  - has multiple GI symptoms as noted in ROS.  No current GI meds.  EGD planned as noted above.   PONV Medium Risk  Total Score: 2           1 AN PONV: Patient is not a current smoker    1 AN PONV: Intended Post Op Opioids            ENDOCRINE    - BMI: Estimated body mass index is 25.7 kg/m  as calculated from the following:    Height as of 2/9/22: 1.753 m (5' 9\").    Weight as of 2/9/22: 78.9 kg (174 lb).  Overweight (BMI 25.0-29.9)  - No history of Diabetes Mellitus    HEME  VTE Low Risk 0.5%            Total Score: 2    VTE: Male      - No history of abnormal bleeding or antiplatelet use.      MSK  - s/p cervical spine surgery and antibiotic treatment in Feb 2022 for cervical spine discitis and abscess. Has some ongoing neck pain below the level of surgery.  ID is aware.  Rifampin previously discontinued.  Continues on long term doxycycline for prophylaxis.  Consider cautious positioning.    - has some discomfort in the right wrist s/p surgery this past summer.  No splint or brace.  Consider cautious positioning.    PSYCH  - hold adderall DOS    ID  - as above in musculoskeletal    Anesthesia  - history of post-op paranoia.  See 2/9/22 anesthesia record.  Noted that midazolam was helpful.    - see note in ENT/airway above            The patient is optimized for their procedure. AVS with information on surgery time/arrival time, meds and NPO status given by nursing staff. No further diagnostic testing indicated.    Virtual visit - Please refer to the physical examination documented by the anesthesiologist in the anesthesia record on the day of surgery.    Video-Visit Details    Type of service:  Video Visit    Patient verbally consented to video service today: YES    Video Start Time: 0847  Video End Time (time video stopped): 0910    Originating Location (pt. Location): Parked in their car    Distant Location (provider location):  " provider's home    Mode of Communication:  Video Conference via [employer]       On the day of service:     Prep time: 10 minutes  Visit time: 23 minutes  Documentation time: 16 minutes  ------------------------------------------  Total time: 49 minutes      KYLAH Dorantes CNP  Preoperative Assessment Center  [facility]  Phone: [phone]  Fax: [phone]

## 2022-10-11 NOTE — PROGRESS NOTES
Heron is a 51 year old who is being evaluated via a billable video visit.      How would you like to obtain your AVS? MyChart  If the video visit is dropped, the invitation should be resent by: Text to cell phone: [phone]    HPI       Review of Systems         Objective    Vitals - Patient Reported  Pain Score: No Pain (0)        Physical Exam

## 2022-10-13 ENCOUNTER — HOSPITAL ENCOUNTER (OUTPATIENT)
Facility: CLINIC | Age: 52
Discharge: HOME | End: 2022-10-13
Attending: INTERNAL MEDICINE | Admitting: INTERNAL MEDICINE
Payer: COMMERCIAL

## 2022-10-13 ENCOUNTER — ANESTHESIA (OUTPATIENT)
Dept: GASTROENTEROLOGY | Facility: CLINIC | Age: 52
End: 2022-10-13
Payer: COMMERCIAL

## 2022-10-13 VITALS
OXYGEN SATURATION: 94 % | DIASTOLIC BLOOD PRESSURE: 77 MMHG | HEART RATE: 74 BPM | TEMPERATURE: 98.4 F | SYSTOLIC BLOOD PRESSURE: 126 MMHG | RESPIRATION RATE: 16 BRPM

## 2022-10-13 LAB — UPPER GI ENDOSCOPY: NORMAL

## 2022-10-13 PROCEDURE — 88313 SPECIAL STAINS GROUP 2: CPT | Mod: 26 | Performed by: STUDENT IN AN ORGANIZED HEALTH CARE EDUCATION/TRAINING PROGRAM

## 2022-10-13 PROCEDURE — 88312 SPECIAL STAINS GROUP 1: CPT | Mod: 26 | Performed by: STUDENT IN AN ORGANIZED HEALTH CARE EDUCATION/TRAINING PROGRAM

## 2022-10-13 PROCEDURE — 88342 IMHCHEM/IMCYTCHM 1ST ANTB: CPT | Mod: 26 | Performed by: STUDENT IN AN ORGANIZED HEALTH CARE EDUCATION/TRAINING PROGRAM

## 2022-10-13 PROCEDURE — 250N000011 HC RX IP 250 OP 636: Performed by: NURSE ANESTHETIST, CERTIFIED REGISTERED

## 2022-10-13 PROCEDURE — 250N000009 HC RX 250: Performed by: NURSE ANESTHETIST, CERTIFIED REGISTERED

## 2022-10-13 PROCEDURE — 43239 EGD BIOPSY SINGLE/MULTIPLE: CPT | Performed by: INTERNAL MEDICINE

## 2022-10-13 PROCEDURE — 258N000003 HC RX IP 258 OP 636: Performed by: NURSE ANESTHETIST, CERTIFIED REGISTERED

## 2022-10-13 PROCEDURE — 88305 TISSUE EXAM BY PATHOLOGIST: CPT | Mod: 26 | Performed by: STUDENT IN AN ORGANIZED HEALTH CARE EDUCATION/TRAINING PROGRAM

## 2022-10-13 PROCEDURE — 370N000017 HC ANESTHESIA TECHNICAL FEE, PER MIN: Performed by: INTERNAL MEDICINE

## 2022-10-13 PROCEDURE — 88305 TISSUE EXAM BY PATHOLOGIST: CPT | Mod: TC | Performed by: INTERNAL MEDICINE

## 2022-10-13 RX ORDER — ONDANSETRON 4 MG/1
4 TABLET, ORALLY DISINTEGRATING ORAL EVERY 6 HOURS PRN
Status: DISCONTINUED | OUTPATIENT
Start: 2022-10-13 | End: 2022-10-13 | Stop reason: HOSPADM

## 2022-10-13 RX ORDER — NALOXONE HYDROCHLORIDE 0.4 MG/ML
0.4 INJECTION, SOLUTION INTRAMUSCULAR; INTRAVENOUS; SUBCUTANEOUS
Status: DISCONTINUED | OUTPATIENT
Start: 2022-10-13 | End: 2022-10-13 | Stop reason: HOSPADM

## 2022-10-13 RX ORDER — KETAMINE HYDROCHLORIDE 10 MG/ML
INJECTION INTRAMUSCULAR; INTRAVENOUS PRN
Status: DISCONTINUED | OUTPATIENT
Start: 2022-10-13 | End: 2022-10-13

## 2022-10-13 RX ORDER — NALOXONE HYDROCHLORIDE 0.4 MG/ML
0.2 INJECTION, SOLUTION INTRAMUSCULAR; INTRAVENOUS; SUBCUTANEOUS
Status: DISCONTINUED | OUTPATIENT
Start: 2022-10-13 | End: 2022-10-13 | Stop reason: HOSPADM

## 2022-10-13 RX ORDER — SODIUM CHLORIDE, SODIUM LACTATE, POTASSIUM CHLORIDE, CALCIUM CHLORIDE 600; 310; 30; 20 MG/100ML; MG/100ML; MG/100ML; MG/100ML
INJECTION, SOLUTION INTRAVENOUS CONTINUOUS PRN
Status: DISCONTINUED | OUTPATIENT
Start: 2022-10-13 | End: 2022-10-13

## 2022-10-13 RX ORDER — PROPOFOL 10 MG/ML
INJECTION, EMULSION INTRAVENOUS PRN
Status: DISCONTINUED | OUTPATIENT
Start: 2022-10-13 | End: 2022-10-13

## 2022-10-13 RX ORDER — PROPOFOL 10 MG/ML
INJECTION, EMULSION INTRAVENOUS CONTINUOUS PRN
Status: DISCONTINUED | OUTPATIENT
Start: 2022-10-13 | End: 2022-10-13

## 2022-10-13 RX ORDER — ONDANSETRON 2 MG/ML
4 INJECTION INTRAMUSCULAR; INTRAVENOUS EVERY 6 HOURS PRN
Status: DISCONTINUED | OUTPATIENT
Start: 2022-10-13 | End: 2022-10-13 | Stop reason: HOSPADM

## 2022-10-13 RX ORDER — LIDOCAINE HYDROCHLORIDE 20 MG/ML
INJECTION, SOLUTION INFILTRATION; PERINEURAL PRN
Status: DISCONTINUED | OUTPATIENT
Start: 2022-10-13 | End: 2022-10-13

## 2022-10-13 RX ORDER — LIDOCAINE 40 MG/G
CREAM TOPICAL
Status: DISCONTINUED | OUTPATIENT
Start: 2022-10-13 | End: 2022-10-13 | Stop reason: HOSPADM

## 2022-10-13 RX ORDER — PROCHLORPERAZINE MALEATE 10 MG
10 TABLET ORAL EVERY 6 HOURS PRN
Status: DISCONTINUED | OUTPATIENT
Start: 2022-10-13 | End: 2022-10-13 | Stop reason: HOSPADM

## 2022-10-13 RX ORDER — ONDANSETRON 2 MG/ML
4 INJECTION INTRAMUSCULAR; INTRAVENOUS
Status: DISCONTINUED | OUTPATIENT
Start: 2022-10-13 | End: 2022-10-13 | Stop reason: HOSPADM

## 2022-10-13 RX ORDER — FLUMAZENIL 0.1 MG/ML
0.2 INJECTION, SOLUTION INTRAVENOUS
Status: DISCONTINUED | OUTPATIENT
Start: 2022-10-13 | End: 2022-10-13 | Stop reason: HOSPADM

## 2022-10-13 RX ADMIN — LIDOCAINE HYDROCHLORIDE 100 MG: 20 INJECTION, SOLUTION INFILTRATION; PERINEURAL at 09:25

## 2022-10-13 RX ADMIN — PROPOFOL 40 MG: 10 INJECTION, EMULSION INTRAVENOUS at 09:25

## 2022-10-13 RX ADMIN — Medication 20 MG: at 09:38

## 2022-10-13 RX ADMIN — SODIUM CHLORIDE, POTASSIUM CHLORIDE, SODIUM LACTATE AND CALCIUM CHLORIDE: 600; 310; 30; 20 INJECTION, SOLUTION INTRAVENOUS at 09:21

## 2022-10-13 RX ADMIN — PROPOFOL 200 MCG/KG/MIN: 10 INJECTION, EMULSION INTRAVENOUS at 09:25

## 2022-10-13 RX ADMIN — MIDAZOLAM 2 MG: 1 INJECTION INTRAMUSCULAR; INTRAVENOUS at 09:21

## 2022-10-13 ASSESSMENT — ENCOUNTER SYMPTOMS: ORTHOPNEA: 0

## 2022-10-13 ASSESSMENT — ACTIVITIES OF DAILY LIVING (ADL)
ADLS_ACUITY_SCORE: 35
ADLS_ACUITY_SCORE: 35

## 2022-10-13 ASSESSMENT — LIFESTYLE VARIABLES: TOBACCO_USE: 1

## 2022-10-13 ASSESSMENT — COPD QUESTIONNAIRES: COPD: 0

## 2022-10-13 NOTE — ANESTHESIA CARE TRANSFER NOTE
Patient: Dave Calero    Procedure: Procedure(s):  ESOPHAGOGASTRODUODENOSCOPY, WITH BIOPSY       Diagnosis: Dysphagia, unspecified type [R13.10]  Heartburn [R12]  Early satiety [R68.81]  Diagnosis Additional Information: No value filed.    Anesthesia Type:   No value filed.     Note:    Oropharynx: oropharynx clear of all foreign objects  Level of Consciousness: awake  Oxygen Supplementation: face mask  Level of Supplemental Oxygen (L/min / FiO2): 6  Independent Airway: airway patency satisfactory and stable  Dentition: dentition unchanged  Vital Signs Stable: post-procedure vital signs reviewed and stable  Report to RN Given: handoff report given  Patient transferred to: Phase II    Handoff Report: Identifed the Patient, Identified the Reponsible Provider, Reviewed the pertinent medical history, Discussed the surgical course, Reviewed Intra-OP anesthesia mangement and issues during anesthesia, Set expectations for post-procedure period and Allowed opportunity for questions and acknowledgement of understanding      Vitals:  Vitals Value Taken Time   /80 10/13/22 1012   Temp     Pulse 102 10/13/22 1012   Resp     SpO2 91 % 10/13/22 1016   Vitals shown include unvalidated device data.    Electronically Signed By: KYLAH Dave CRNA  October 13, 2022  10:17 AM

## 2022-10-13 NOTE — OR NURSING
EGD with biopsies Performed under MAC, patient tolerated. Patient required extra support from anesthesia staff during procedure to maintain airway and manage secretions.  Transferred to  and report given to recovery RN.

## 2022-10-13 NOTE — ANESTHESIA POSTPROCEDURE EVALUATION
Patient: Dave Calero    Procedure: Procedure(s):  ESOPHAGOGASTRODUODENOSCOPY, WITH BIOPSY       Anesthesia Type:  MAC    Note:  Disposition: Outpatient   Postop Pain Control: Uneventful            Sign Out: Well controlled pain   PONV: No   Neuro/Psych: Uneventful            Sign Out: Acceptable/Baseline neuro status   Airway/Respiratory: Uneventful            Sign Out: Acceptable/Baseline resp. status   CV/Hemodynamics: Uneventful            Sign Out: Acceptable CV status; No obvious hypovolemia; No obvious fluid overload   Other NRE: NONE   DID A NON-ROUTINE EVENT OCCUR? No           Last vitals:  Vitals Value Taken Time   /74 10/13/22 1145   Temp     Pulse 82 10/13/22 1145   Resp     SpO2 91 % 10/13/22 1155   Vitals shown include unvalidated device data.    Electronically Signed By: Gerard Beckford MD  October 13, 2022  12:37 PM

## 2022-10-13 NOTE — ANESTHESIA PREPROCEDURE EVALUATION
Anesthesia Pre-Procedure Evaluation    Patient: Dave Calero   MRN: [id number] : 1970        Procedure : Procedure(s):  ESOPHAGOGASTRODUODENOSCOPY (EGD)          Past Medical History:   Diagnosis Date     ADHD (attention deficit hyperactivity disorder)      Anesthesia complication     post-op paranoia     Anxiety and depression      Gastroesophageal reflux disease without esophagitis      Seasonal allergic rhinitis       Past Surgical History:   Procedure Laterality Date     OPTICAL TRACKING SYSTEM FUSION POSTERIOR CERVICAL THREE + LEVELS N/A 2022    Procedure: Posterior instrumented spinal fusion cervical 5 to thoracic 2, with laminectomies at Cervical 5, Cervical 6, Smithe Tucker Oseotomy Cervical 5-6, Cervical 6-7 ;  Surgeon: Kulwinder Calixto MD;  Location: [facility] OR     OPTICAL TRACKING SYSTEM FUSION POSTERIOR SPINE LUMBAR N/A 2022    Procedure: Anterior cervical diskectomy and fusion, cervical 5 to thoracic 1 (3 levels); right or anterior iliac crest autograft harvest. ;  Surgeon: Kulwinder Calixto MD;  Location: [facility] OR     TRANSESOPHAGEAL ECHOCARDIOGRAM INTRAOPERATIVE N/A 2/15/2022    Procedure: ECHOCARDIOGRAM, TRANSESOPHAGEAL (PREM), INTRAOPERATIVE;  Surgeon: GENERIC ANESTHESIA PROVIDER;  Location: [facility] OR      No Known Allergies   Social History     Tobacco Use     Smoking status: Former     Packs/day: 0.50     Years: 20.00     Pack years: 10.00     Types: Cigarettes     Quit date:      Years since quittin.7     Smokeless tobacco: Never   Substance Use Topics     Alcohol use: Not Currently      Wt Readings from Last 1 Encounters:   22 78.9 kg (174 lb)        Anesthesia Evaluation   Pt has had prior anesthetic.     History of anesthetic complications  - difficult airway.  paranoia.    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  Is able to walk several blocks continuously.     Hematologic:  - neg hematologic  ROS  (-) history of blood clots and history of blood transfusion   Musculoskeletal: Comment: Admitted 2/2020 for cervical discitis and abscess.  S/p surgery and IV antibiotics.  Rifampin has been discontinued.  Continues on doxycycline for prophylaxis. Slightly limited neck ROM.     Chronic neck pain - ID aware, unknown etiology for this pain      Right wrist ROM limitations s/p surgery in July 2022 - no brace or splint      GI/Hepatic: Comment: Dysphagia  Heartburn   Early satiety  Regurgitation  Globus sensation    (+) GERD, Symptomatic,     Renal/Genitourinary:  - neg Renal ROS     Endo:  - neg endo ROS     Psychiatric/Substance Use:     (+) psychiatric history anxiety and other (comment) (ADHD)     Infectious Disease: Comment: See Musculoskeletal notes - neg infectious disease ROS  (-) Recent Fever   Malignancy:  - neg malignancy ROS     Other:  - neg other ROS    (+) , no H/O Chronic Pain,        Physical Exam    Airway  airway exam normal           Respiratory Devices and Support         Dental  no notable dental history         Cardiovascular   cardiovascular exam normal          Pulmonary                   OUTSIDE LABS:  CBC:   Lab Results   Component Value Date    WBC 6.9 06/14/2022    WBC 7.4 03/24/2022    HGB 15.3 06/14/2022    HGB 11.9 (L) 03/24/2022    HCT 44.8 06/14/2022    HCT 35.8 (L) 03/24/2022     06/14/2022     03/24/2022     BMP:   Lab Results   Component Value Date     06/14/2022     03/24/2022    POTASSIUM 4.0  06/14/2022    POTASSIUM 3.7 03/24/2022    CHLORIDE 105 06/14/2022    CHLORIDE 107 03/24/2022    CO2 30 06/14/2022    CO2 28 03/24/2022    BUN 19 06/14/2022    BUN 13 03/24/2022    CR 0.81 06/14/2022    CR 0.86 03/24/2022     06/14/2022    GLC 94 03/24/2022     COAGS:   Lab Results   Component Value Date    INR 0.98 02/09/2022     POC: No results found for: BGM, HCG, HCGS  HEPATIC:   Lab Results   Component Value Date    ALBUMIN 3.8 06/14/2022    PROTTOTAL 7.0 06/14/2022    ALT 22 06/14/2022    AST 13 06/14/2022    ALKPHOS 109 06/14/2022    BILITOTAL 0.4 06/14/2022     OTHER:   Lab Results   Component Value Date    PH 7.48 (H) 02/12/2022    LACT 1.3 02/14/2022    VIANEY 9.2 06/14/2022    CRP 0.2 06/14/2022       Anesthesia Plan    ASA Status:  2   NPO Status:  NPO Appropriate    Anesthesia Type: MAC.     - Reason for MAC: immobility needed   Induction: Intravenous.   Maintenance: TIVA.        Consents    Anesthesia Plan(s) and associated risks, benefits, and realistic alternatives discussed. Questions answered and patient/representative(s) expressed understanding.    - Discussed:     - Discussed with:  Patient      - Extended Intubation/Ventilatory Support Discussed: No.      - Patient is DNR/DNI Status: No    Use of blood products discussed: No .     Postoperative Care       PONV prophylaxis: Background Propofol Infusion     Comments:                Gerard Beckford MD

## 2022-10-20 LAB
PATH REPORT.COMMENTS IMP SPEC: NORMAL
PATH REPORT.FINAL DX SPEC: NORMAL
PATH REPORT.GROSS SPEC: NORMAL
PATH REPORT.MICROSCOPIC SPEC OTHER STN: NORMAL
PATH REPORT.RELEVANT HX SPEC: NORMAL
PHOTO IMAGE: NORMAL

## 2022-11-07 ENCOUNTER — TELEPHONE (OUTPATIENT)
Dept: GASTROENTEROLOGY | Facility: CLINIC | Age: 52
End: 2022-11-07

## 2022-11-07 DIAGNOSIS — R13.10 DYSPHAGIA, UNSPECIFIED TYPE: Primary | ICD-10-CM

## 2022-11-07 DIAGNOSIS — K22.10 EROSIVE ESOPHAGITIS: ICD-10-CM

## 2022-11-07 RX ORDER — RABEPRAZOLE SODIUM 20 MG/1
20 TABLET, DELAYED RELEASE ORAL 2 TIMES DAILY
Qty: 180 TABLET | Refills: 3 | Status: SHIPPED | OUTPATIENT
Start: 2022-11-07 | End: 2023-11-07

## 2022-11-07 NOTE — TELEPHONE ENCOUNTER
Erosive esophagitis on EGD, has taken PPI BID (omeprazole and esomeprazole) in the past but not at the time of endoscopy. Had been off of PPI for 4 weeks prior to endoscopy. Not currently on PPI because he feels it does not do anything for symptoms. The patient states that not eating 5 hours before going to sleep is the only thing that prevents reflux. He is not eating anything after 4pm. If he eats anything after 4pm and lies down he has reflux all night long. Patient has a sensation of globus constantly. He denies dysphagia to foods and feels this is more a persistent sensation of globus.     Recommend Aciphex BID for 8 weeks and repeat endoscopy. If symptoms persist, I discussed the possibility of HRM.     Kyle Hook DO   of Medicine  Director, Esophageal Disorders Program  Division of Gastroenterology, Hepatology, and Nutrition  [facility]

## 2022-11-08 NOTE — PROGRESS NOTES
This is a recent snapshot of the patient's [facility] medical record.  For current drug dose and complete information and questions, call [phone] or In Basket pool, [facility]v home infusion ([id number])  CSN Number:  [id number]

## 2022-11-08 NOTE — PROGRESS NOTES
This is a recent snapshot of the patient's [facility] medical record.  For current drug dose and complete information and questions, call [phone] or In Basket pool, fv home infusion ([id number])  CSN Number:  [id number]

## 2022-11-10 ENCOUNTER — MYC MEDICAL ADVICE (OUTPATIENT)
Dept: ORTHOPEDICS | Facility: CLINIC | Age: 52
End: 2022-11-10

## 2022-11-10 DIAGNOSIS — Z98.890 HX OF CERVICAL SPINE SURGERY: Primary | ICD-10-CM

## 2022-11-10 DIAGNOSIS — M46.42 CERVICAL DISCITIS: ICD-10-CM

## 2022-11-10 DIAGNOSIS — M47.12 CERVICAL SPONDYLOSIS WITH MYELOPATHY: ICD-10-CM

## 2022-11-10 NOTE — PROGRESS NOTES
This is a recent snapshot of the patient's [facility] medical record.  For current drug dose and complete information and questions, call [phone] or In Basket pool, fv home infusion ([id number])  CSN Number:  [id number]

## 2022-11-15 ENCOUNTER — DOCUMENTATION ONLY (OUTPATIENT)
Dept: ORTHOPEDICS | Facility: CLINIC | Age: 52
End: 2022-11-15

## 2022-11-15 NOTE — PROGRESS NOTES
Type of Form Received: hospital form     Form Received (Date) 11/14/22   Form Filled out Yes, date 11/15/22   Placed in provider folder Yes

## 2022-11-16 NOTE — PROGRESS NOTES
This is a recent snapshot of the patient's [facility] medical record.  For current drug dose and complete information and questions, call [phone] or In Basket pool, fv home infusion ([id number])  CSN Number:  [id number]

## 2022-11-17 ENCOUNTER — LAB (OUTPATIENT)
Dept: LAB | Facility: CLINIC | Age: 52
End: 2022-11-17
Payer: COMMERCIAL

## 2022-11-17 ENCOUNTER — ANCILLARY PROCEDURE (OUTPATIENT)
Dept: GENERAL RADIOLOGY | Facility: CLINIC | Age: 52
End: 2022-11-17
Attending: ORTHOPAEDIC SURGERY
Payer: COMMERCIAL

## 2022-11-17 ENCOUNTER — OFFICE VISIT (OUTPATIENT)
Dept: ORTHOPEDICS | Facility: CLINIC | Age: 52
End: 2022-11-17
Payer: COMMERCIAL

## 2022-11-17 DIAGNOSIS — Z98.1 S/P SPINAL FUSION: Primary | ICD-10-CM

## 2022-11-17 DIAGNOSIS — M47.12 CERVICAL SPONDYLOSIS WITH MYELOPATHY: ICD-10-CM

## 2022-11-17 DIAGNOSIS — M46.42 CERVICAL DISCITIS: ICD-10-CM

## 2022-11-17 DIAGNOSIS — Z98.890 HX OF CERVICAL SPINE SURGERY: ICD-10-CM

## 2022-11-17 LAB
BASOPHILS # BLD AUTO: 0.1 10E3/UL (ref 0–0.2)
BASOPHILS NFR BLD AUTO: 2 %
CRP SERPL-MCNC: <3 MG/L
EOSINOPHIL # BLD AUTO: 0.4 10E3/UL (ref 0–0.7)
EOSINOPHIL NFR BLD AUTO: 5 %
ERYTHROCYTE [DISTWIDTH] IN BLOOD BY AUTOMATED COUNT: 12.2 % (ref 10–15)
ERYTHROCYTE [SEDIMENTATION RATE] IN BLOOD BY WESTERGREN METHOD: 7 MM/HR (ref 0–20)
HCT VFR BLD AUTO: 46.9 % (ref 40–53)
HGB BLD-MCNC: 15.9 G/DL (ref 13.3–17.7)
IMM GRANULOCYTES # BLD: 0 10E3/UL
IMM GRANULOCYTES NFR BLD: 0 %
LYMPHOCYTES # BLD AUTO: 2.8 10E3/UL (ref 0.8–5.3)
LYMPHOCYTES NFR BLD AUTO: 33 %
MCH RBC QN AUTO: 29.2 PG (ref 26.5–33)
MCHC RBC AUTO-ENTMCNC: 33.9 G/DL (ref 31.5–36.5)
MCV RBC AUTO: 86 FL (ref 78–100)
MONOCYTES # BLD AUTO: 0.7 10E3/UL (ref 0–1.3)
MONOCYTES NFR BLD AUTO: 8 %
NEUTROPHILS # BLD AUTO: 4.3 10E3/UL (ref 1.6–8.3)
NEUTROPHILS NFR BLD AUTO: 52 %
NRBC # BLD AUTO: 0 10E3/UL
NRBC BLD AUTO-RTO: 0 /100
PLATELET # BLD AUTO: 287 10E3/UL (ref 150–450)
RBC # BLD AUTO: 5.45 10E6/UL (ref 4.4–5.9)
WBC # BLD AUTO: 8.4 10E3/UL (ref 4–11)

## 2022-11-17 PROCEDURE — 86140 C-REACTIVE PROTEIN: CPT | Performed by: PATHOLOGY

## 2022-11-17 PROCEDURE — 99214 OFFICE O/P EST MOD 30 MIN: CPT | Performed by: PHYSICIAN ASSISTANT

## 2022-11-17 PROCEDURE — 85025 COMPLETE CBC W/AUTO DIFF WBC: CPT | Performed by: PATHOLOGY

## 2022-11-17 PROCEDURE — 85652 RBC SED RATE AUTOMATED: CPT | Performed by: PATHOLOGY

## 2022-11-17 PROCEDURE — 36415 COLL VENOUS BLD VENIPUNCTURE: CPT | Performed by: PATHOLOGY

## 2022-11-17 PROCEDURE — 72040 X-RAY EXAM NECK SPINE 2-3 VW: CPT | Mod: GC | Performed by: RADIOLOGY

## 2022-11-17 NOTE — LETTER
11/17/2022         RE: Dave Calero  [address] [facility] [address]        Dear Colleague,    Thank you for referring your patient, Dave Calero, to the [facility] [place]. Please see a copy of my visit note below.    Spine Surgical Hx:  02/09/2022 - St 1 (anterior): ACDF C5-6,C7-T1,T1-2 for discitis C7-T1; Right anterior ICBG harvest (Sembrano), advanced cervical spondylosis with stenosis and myelopathy.  [Implants: Medtronic cervical PEEK cages [id number]].  Cultures: MSSA, Cutibacterium acnes.  02/12/2022 - St 2 (posterior): PISF C5-T2; laminectomies C5 and C6 (C5-6 and C6-7); use of allograft (Melvinabrano).  [Implants: Medtronic Infinity screw system, 3.5 mm titanium rods x 2].    I have reviewed and updated the patient's Past Medical History, Social History, Family History and Medication List.    ALLERGIES  Patient has no known allergies.    Spine Surgery Follow Up    REFERRING PHYSICIAN: No ref. provider found   PRIMARY CARE PHYSICIAN: Maurisio Araiza           Chief Complaint:   RECHECK (pain under incision site.  )      History of Present Illness:  Symptom Profile Including: location of symptoms, onset, severity, exacerbating/alleviating factors, previous treatments:      Dave Calero is a 52 year old male who presents today for  follow up due to new pain. Last seen 8/25/22 with Dr. Calixto for routine 6 month post-op. He had persistent dysphagia and Dr. Calixto recommended proceeding with EGD. Also reported left paraspinal upper back pain (towards the left of the base of posterior surgical scar), that also bulges towards the end of the day.  At that time, he was still on PO antibiotics: Rifampin 300 mg PO bid. Doxycycline 100 mg PO bid.    Last seen by infectious disease on 9/15/2022: Recommendations were to stop rifampin, continue doxycycline (continue x1 year versus indefinite).  He had EGD done 10/13/2022    Today, patient  reports that that for the past 2 to 5 weeks he has had new onset of pain localized midline just below his posterior cervical incision.  He denies any injury or trauma to the area.  He says that the pain comes and goes.  The only thing that helps with the pain is heat.  He denies fever/chills, ill feeling, radicular symptoms, balance difficulties, difficulties with fine motor movements or dropping things.  Denies bowel/bladder changes.  He reports that this pain feels similar to the pain he had right before he was diagnosed with cervical discitis.  He says that when he had discitis he did not have any fever/chills/ill feeling, etc.  He continues to take doxycycline as instructed by infectious disease team. He also continues to have difficulties with swallowing.      Neck Disability Index (NDI) Questionnaire    Neck Disability Index (NDI) 8/25/2022   Neck Disability Index: Count 10   NDI: Total Score = SUM (points for all 10 findings) 7   Neck Disability in Percent = (Total Score) / 50 * 100 14 (%)      Preop NDI          NR  6 wk                   48.89%  3 mo                  24.44%  6mo                   14%  9mo    n/a%            Physical Exam:   PHYSICAL EXAM:   Constitutional - Patient is healthy, well-nourished and appears stated age   Respiratory - Patient is breathing normally and in no respiratory distress.   Skin - No suspicious rashes or lesions.   Psychiatric - Normal mood and affect.   Cardiovascular - Extremities warm and well perfused.   Eyes - Visual acuity is normal to the written word.   ENT - Hearing intact to the spoken word.   GI - No abdominal distention.   Musculoskeletal - Non-antalgic gait without use of assistive devices.  Able to complete tandem gait without instability.  Negative Romberg's.        Cervical spine:    Appearance - Normal     Palpation - Non-tender to palpation      ROM - limted, painless    Motor -     UPPER EXTREMITY Left Right    strength 5/5 5/5   Finger ab/adduction  5/5 5/5   Wrist flexion 5/5 5/5   Wrist extension 5/5 5/5   Elbow flexion 5/5 5/5   Elbow extension 5/5 5/5   Shoulder abduction 5/5 5/5         Special Tests -      Lhermitte's Test - negative     Spurling's Test -negative          Thoracic Spine:    Appearance - Normal     Palpation - Non-tender to palpation     Strength/ROM - deferred       Neurologic - Sensation intact to light touch bilaterally. hoffmans negative bilateral. DTR +1 bilateral UE.               Imaging:   I ordered and independently reviewed new radiographs at this clinic visit. The results were discussed with the patient.  Findings include:    11/17/2022 XR cervical spine AP/lateral views: Stable appearance of anterior and posterior hardware C5-T2.  Arthritic changes proximal to fusion construct.  No evidence of new degenerative changes at T2-3 disc space.    11/17/22 Labs:  CRP <3.00  ESR: 7  WBC: 8.4           Assessment and Plan:   Assessment:  52 year old male 9 months s/p ACDF C5-6,C7-T1,T1-2 for discitis C7-T1 and PISF C5-T2 with new onset midline pain T2-3 (reportedly similar to pain prior to initial discitis diagnosis)     Plan:  Reviewed today's inflammatory lab results and XR results with patient.  There does not appear to be any gross hardware problems or new changes distal to the fusion construct where he is reporting new pain.  His inflammatory lab results continue to remain within normal limits.  However, with his history of pain being similar to how it was before previous episode of discitis we will get advanced imaging to confirm there is no new discitis developing.  Reviewed this plan with Dr. Calixto who agrees with obtaining advanced imaging.    - Ordered MRI w/ and w/o contrast  - ordered CT w/o contrast   - Follow up after above.    Respectfully,  Cammy Reyes (nilda Resendiz), PANATACHA  Orthopaedic Spine Surgery  Dept Orthopaedic Surgery, [facility]  [facility] [place] Phone: [phone]    >30 minutes spent on the date of the  encounter doing chart review, review of outside records, review of test results, my own interpretation of tests, documentation, patient history, physical exam & discussion of plan with patient and family.    Dictation Disclaimer: Some of this Note has been completed with voice-recognition dictation software. Although errors are generally corrected real-time, there is the potential for a rare error to be present in the completed chart.        SHAUNA NobleC

## 2022-11-17 NOTE — PROGRESS NOTES
Spine Surgical Hx:  02/09/2022 - St 1 (anterior): ACDF C5-6,C7-T1,T1-2 for discitis C7-T1; Right anterior ICBG harvest (Durga), advanced cervical spondylosis with stenosis and myelopathy.  [Implants: Medtronic cervical PEEK cages [id number]].  Cultures: MSSA, Cutibacterium acnes.  02/12/2022 - St 2 (posterior): PISF C5-T2; laminectomies C5 and C6 (C5-6 and C6-7); use of allograft (Lalano).  [Implants: Medtronic Infinity screw system, 3.5 mm titanium rods x 2].    I have reviewed and updated the patient's Past Medical History, Social History, Family History and Medication List.    ALLERGIES  Patient has no known allergies.    Spine Surgery Follow Up    REFERRING PHYSICIAN: No ref. provider found   PRIMARY CARE PHYSICIAN: Maurisio Araiza           Chief Complaint:   RECHECK (pain under incision site.  )      History of Present Illness:  Symptom Profile Including: location of symptoms, onset, severity, exacerbating/alleviating factors, previous treatments:      Dave Calero is a 52 year old male who presents today for  follow up due to new pain. Last seen 8/25/22 with Dr. Calixto for routine 6 month post-op. He had persistent dysphagia and Dr. Calixto recommended proceeding with EGD. Also reported left paraspinal upper back pain (towards the left of the base of posterior surgical scar), that also bulges towards the end of the day.  At that time, he was still on PO antibiotics: Rifampin 300 mg PO bid. Doxycycline 100 mg PO bid.    Last seen by infectious disease on 9/15/2022: Recommendations were to stop rifampin, continue doxycycline (continue x1 year versus indefinite).  He had EGD done 10/13/2022    Today, patient reports that that for the past 2 to 5 weeks he has had new onset of pain localized midline just below his posterior cervical incision.  He denies any injury or trauma to the area.  He says that the pain comes and goes.  The only thing that helps with the pain is heat.  He denies fever/chills,  ill feeling, radicular symptoms, balance difficulties, difficulties with fine motor movements or dropping things.  Denies bowel/bladder changes.  He reports that this pain feels similar to the pain he had right before he was diagnosed with cervical discitis.  He says that when he had discitis he did not have any fever/chills/ill feeling, etc.  He continues to take doxycycline as instructed by infectious disease team. He also continues to have difficulties with swallowing.      Neck Disability Index (NDI) Questionnaire    Neck Disability Index (NDI) 8/25/2022   Neck Disability Index: Count 10   NDI: Total Score = SUM (points for all 10 findings) 7   Neck Disability in Percent = (Total Score) / 50 * 100 14 (%)      Preop NDI          NR  6 wk                   48.89%  3 mo                  24.44%  6mo                   14%  9mo    n/a%            Physical Exam:   PHYSICAL EXAM:   Constitutional - Patient is healthy, well-nourished and appears stated age   Respiratory - Patient is breathing normally and in no respiratory distress.   Skin - No suspicious rashes or lesions.   Psychiatric - Normal mood and affect.   Cardiovascular - Extremities warm and well perfused.   Eyes - Visual acuity is normal to the written word.   ENT - Hearing intact to the spoken word.   GI - No abdominal distention.   Musculoskeletal - Non-antalgic gait without use of assistive devices.  Able to complete tandem gait without instability.  Negative Romberg's.        Cervical spine:    Appearance - Normal     Palpation - Non-tender to palpation      ROM - limted, painless    Motor -     UPPER EXTREMITY Left Right    strength 5/5 5/5   Finger ab/adduction 5/5 5/5   Wrist flexion 5/5 5/5   Wrist extension 5/5 5/5   Elbow flexion 5/5 5/5   Elbow extension 5/5 5/5   Shoulder abduction 5/5 5/5         Special Tests -      Lhermitte's Test - negative     Spurling's Test -negative          Thoracic Spine:    Appearance - Normal     Palpation -  Non-tender to palpation     Strength/ROM - deferred       Neurologic - Sensation intact to light touch bilaterally. hoffmans negative bilateral. DTR +1 bilateral UE.               Imaging:   I ordered and independently reviewed new radiographs at this clinic visit. The results were discussed with the patient.  Findings include:    11/17/2022 XR cervical spine AP/lateral views: Stable appearance of anterior and posterior hardware C5-T2.  Arthritic changes proximal to fusion construct.  No evidence of new degenerative changes at T2-3 disc space.    11/17/22 Labs:  CRP <3.00  ESR: 7  WBC: 8.4           Assessment and Plan:   Assessment:  52 year old male 9 months s/p ACDF C5-6,C7-T1,T1-2 for discitis C7-T1 and PISF C5-T2 with new onset midline pain T2-3 (reportedly similar to pain prior to initial discitis diagnosis)     Plan:  Reviewed today's inflammatory lab results and XR results with patient.  There does not appear to be any gross hardware problems or new changes distal to the fusion construct where he is reporting new pain.  His inflammatory lab results continue to remain within normal limits.  However, with his history of pain being similar to how it was before previous episode of discitis we will get advanced imaging to confirm there is no new discitis developing.  Reviewed this plan with Dr. Calixto who agrees with obtaining advanced imaging.    - Ordered MRI w/ and w/o contrast  - ordered CT w/o contrast   - Follow up after above.    Respectfully,  Cammy Resendiz), PALOMO  Orthopaedic Spine Surgery  Dept Orthopaedic Surgery, [facility]  [facility] [place] Phone: [phone]    >30 minutes spent on the date of the encounter doing chart review, review of outside records, review of test results, my own interpretation of tests, documentation, patient history, physical exam & discussion of plan with patient and family.    Dictation Disclaimer: Some of this Note has been completed with voice-recognition  dictation software. Although errors are generally corrected real-time, there is the potential for a rare error to be present in the completed chart.

## 2022-11-19 NOTE — PROGRESS NOTES
This is a recent snapshot of the patient's [facility] medical record.  For current drug dose and complete information and questions, call [phone] or In Basket pool, fv home infusion ([id number])  CSN Number:  [id number]

## 2022-11-22 NOTE — PROGRESS NOTES
This is a recent snapshot of the patient's [facility] medical record.  For current drug dose and complete information and questions, call [phone] or In Basket pool, fv home infusion ([id number])  CSN Number:  [id number]

## 2022-11-22 NOTE — PROGRESS NOTES
This is a recent snapshot of the patient's [facility] medical record.  For current drug dose and complete information and questions, call [phone] or In Basket pool, fv home infusion ([id number])  CSN Number:  [id number]

## 2022-11-22 NOTE — PROGRESS NOTES
This is a recent snapshot of the patient's [facility] medical record.  For current drug dose and complete information and questions, call [phone] or In Basket pool, fv home infusion ([id number])  CSN Number:  [id number]

## 2022-11-23 NOTE — PROGRESS NOTES
Received Completed forms Yes   Faxed Forms Faxed To: [employer]  Fax Number: [phone]   Sent to HIM (Date) 11/22/22

## 2022-12-05 ENCOUNTER — HOSPITAL ENCOUNTER (OUTPATIENT)
Dept: MRI IMAGING | Facility: HOSPITAL | Age: 52
Discharge: HOME | End: 2022-12-05
Attending: PHYSICIAN ASSISTANT
Payer: COMMERCIAL

## 2022-12-05 DIAGNOSIS — M46.42 CERVICAL DISCITIS: ICD-10-CM

## 2022-12-05 DIAGNOSIS — Z98.1 S/P SPINAL FUSION: ICD-10-CM

## 2022-12-05 PROCEDURE — 72146 MRI CHEST SPINE W/O DYE: CPT

## 2022-12-05 RX ORDER — GADOBUTROL 604.72 MG/ML
0.1 INJECTION INTRAVENOUS ONCE
Status: DISCONTINUED | OUTPATIENT
Start: 2022-12-05 | End: 2022-12-05 | Stop reason: CLARIF

## 2023-02-09 ENCOUNTER — ANCILLARY PROCEDURE (OUTPATIENT)
Dept: CT IMAGING | Facility: CLINIC | Age: 53
End: 2023-02-09
Attending: ORTHOPAEDIC SURGERY
Payer: COMMERCIAL

## 2023-02-09 ENCOUNTER — OFFICE VISIT (OUTPATIENT)
Dept: ORTHOPEDICS | Facility: CLINIC | Age: 53
End: 2023-02-09
Payer: COMMERCIAL

## 2023-02-09 DIAGNOSIS — Z98.890 HX OF CERVICAL SPINE SURGERY: ICD-10-CM

## 2023-02-09 DIAGNOSIS — Z98.1 S/P CERVICAL SPINAL FUSION: Primary | ICD-10-CM

## 2023-02-09 PROCEDURE — 72125 CT NECK SPINE W/O DYE: CPT | Performed by: STUDENT IN AN ORGANIZED HEALTH CARE EDUCATION/TRAINING PROGRAM

## 2023-02-09 PROCEDURE — 99213 OFFICE O/P EST LOW 20 MIN: CPT | Performed by: ORTHOPAEDIC SURGERY

## 2023-02-09 NOTE — PROGRESS NOTES
Spine Surgical Hx:  02/09/2022 - St 1 (anterior): ACDF C5-6,C7-T1,T1-2 for discitis C7-T1; Right anterior ICBG harvest (Sembrano), advanced cervical spondylosis with stenosis and myelopathy.  [Implants: Medtronic cervical PEEK cages [id number]].  Cultures: MSSA, Cutibacterium acnes.  02/12/2022 - St 2 (posterior): PISF C5-T2; laminectomies C5 and C6 (C5-6 and C6-7); use of allograft (Sembrano).  [Implants: Medtronic Infinity screw system, 3.5 mm titanium rods x 2].      In-Person Visit    Chief Complaint   Patient presents with     RECHECK     Follow up CT results        S>  52 year old male, RHD, 1 yr postop; last seen at 9 mos c/o Cammy Reyes PA-C. At that time, reporting new pain localized to midline just below his posterior cervical incision.  Similar to the pain he felt right before he was diagnosed with discitis.  Still on PO doxycycline.  Still with dysphagia.  P> MRI and CT.    Unaccompanied.  Patient tells me that his neck is doing well.  Compared to his last visit with Cammy Reyes PA-C, his neck symptoms have improved.  It is not an issue anymore.  At that time, however, he did mention that it felt similar to neck symptoms prior to being diagnosed with discitis.  As such, we ordered thoracic MRI, to make sure there is no progression or recurrence of discitis at the levels below the fusion.    His main problem at this point is inability to sleep because of gastroesophageal reflux.  He said if he eats anything, he cannot lay down for at least 8 hours.  This is causing him being unable to sleep.  He said he most times would try not to eat anything after 2 PM.  He had been evaluated by our gastroenterology service, underwent EGD in October.  Unfortunately, per patient, he was told there was nothing that can be done about it.  He is quite unhappy with this, as it really is adversely affecting his quality of life.  He said he does not even sleep with his wife anymore, and he sleeps in the basement or at least  tries to sleep.    He is not on oral antibiotics anymore for his neck.    Neck Disability Index (NDI) Questionnaire    Neck Disability Index (NDI) 8/25/2022   Neck Disability Index: Count 10   NDI: Total Score = SUM (points for all 10 findings) 7   Neck Disability in Percent = (Total Score) / 50 * 100 14 (%)      Preop NDI          NR  6 wk                   48.89%  3 mo                  24.44%  6mo                   14%  1yr  NR    Visual Analog Pain Scale  Back Pain Scale 0-10: 0  Right leg pain: 0  Left leg pain: 0    PROMIS-10 Scores  Global Mental Health Score: (P) 13  Global Physical Health Score: (P) 16  PROMIS TOTAL - SUBSCORES: (P) 29    O>   Alert, oriented x 3, cooperative.  Not in CP distress.  There were no vitals taken for this visit.  Surgical incision(s) well-healed, no sign of infection.  Ambulates independently.  Grossly neurologically intact all extremities.    Imaging:    Cervical CT obtained today shows solid anterior posterior arthrodesis C5-T2.  No sign of fixation loosening or failure.  Shows spondylosis with anterior osteophytes at the suprajacent level C4-5, but stable compared to previous imaging.  No evidence of instability.    Thoracic MRI shows no evidence of pathology below the VIOLETTA T2.  No sign of thoracic canal stenosis or spinal cord compression.  Overall, very reassuring study.    A>   52/m RHD with:  1.  Solid arthrodesis C5-T2, 1 year status post staged anterior posterior fusion for discitis (MSSA, cutibacterium acnes), not on antibiotics at present.  2.  Resolved recurrence of neck pain reported in November 2022, without evidence of significant thoracic pathology on MRI.    P>    Congratulated and reassured patient.  I am very happy that the recurrent neck pain symptoms he reported in November are now resolved.  I also reassured him regarding the thoracic MRI results, as well as the cervical CT scan results.  The fusion is already solid.  No evidence of thoracic pathology.  He is  doing very well at this point in regards to his spine.    I am very sorry to hear about his inability to sleep related to his gastroesophageal reflux disease.  If he would like to be reevaluated by a different gastroenterology group, I encouraged him to discuss with his primary care physician (Dr. Araiza) who could perhaps refer him to an outside group, such as [facility].    Return to clinic as needed.  15 minutes spent on the date of the encounter doing chart review/review of outside records/review of test results/interpretation of tests/patient visit/documentation/discussion with other provider(s)/discussion with patient and family.      Kulwinder Calixto MD    Orthopaedic Spine Surgery  Dept Orthopaedic Surgery, [facility]  [phone] office, [phone] pager  [email or web address]

## 2023-02-09 NOTE — LETTER
2/9/2023         RE: Dave Calero  [address] [facility] [address]        Dear Colleague,    Thank you for referring your patient, Dave Calero, to the [facility] [place]. Please see a copy of my visit note below.    Spine Surgical Hx:  02/09/2022 - St 1 (anterior): ACDF C5-6,C7-T1,T1-2 for discitis C7-T1; Right anterior ICBG harvest (Sembrano), advanced cervical spondylosis with stenosis and myelopathy.  [Implants: Medtronic cervical PEEK cages [phone]].  Cultures: MSSA, Cutibacterium acnes.  02/12/2022 - St 2 (posterior): PISF C5-T2; laminectomies C5 and C6 (C5-6 and C6-7); use of allograft (Sembrano).  [Implants: Medtronic Infinity screw system, 3.5 mm titanium rods x 2].      In-Person Visit    Chief Complaint   Patient presents with     RECHECK     Follow up CT results        S>  52 year old male, RHD, 1 yr postop; last seen at 9 mos c/o Cammy Reyes PA-C. At that time, reporting new pain localized to midline just below his posterior cervical incision.  Similar to the pain he felt right before he was diagnosed with discitis.  Still on PO doxycycline.  Still with dysphagia.  P> MRI and CT.    Unaccompanied.  Patient tells me that his neck is doing well.  Compared to his last visit with Cammy Reyes PA-C, his neck symptoms have improved.  It is not an issue anymore.  At that time, however, he did mention that it felt similar to neck symptoms prior to being diagnosed with discitis.  As such, we ordered thoracic MRI, to make sure there is no progression or recurrence of discitis at the levels below the fusion.    His main problem at this point is inability to sleep because of gastroesophageal reflux.  He said if he eats anything, he cannot lay down for at least 8 hours.  This is causing him being unable to sleep.  He said he most times would try not to eat anything after 2 PM.  He had been evaluated by our gastroenterology service,  underwent EGD in October.  Unfortunately, per patient, he was told there was nothing that can be done about it.  He is quite unhappy with this, as it really is adversely affecting his quality of life.  He said he does not even sleep with his wife anymore, and he sleeps in the basement or at least tries to sleep.    He is not on oral antibiotics anymore for his neck.    Neck Disability Index (NDI) Questionnaire    Neck Disability Index (NDI) 8/25/2022   Neck Disability Index: Count 10   NDI: Total Score = SUM (points for all 10 findings) 7   Neck Disability in Percent = (Total Score) / 50 * 100 14 (%)      Preop NDI          NR  6 wk                   48.89%  3 mo                  24.44%  6mo                   14%  1yr  NR    Visual Analog Pain Scale  Back Pain Scale 0-10: 0  Right leg pain: 0  Left leg pain: 0    PROMIS-10 Scores  Global Mental Health Score: (P) 13  Global Physical Health Score: (P) 16  PROMIS TOTAL - SUBSCORES: (P) 29    O>   Alert, oriented x 3, cooperative.  Not in CP distress.  There were no vitals taken for this visit.  Surgical incision(s) well-healed, no sign of infection.  Ambulates independently.  Grossly neurologically intact all extremities.    Imaging:    Cervical CT obtained today shows solid anterior posterior arthrodesis C5-T2.  No sign of fixation loosening or failure.  Shows spondylosis with anterior osteophytes at the suprajacent level C4-5, but stable compared to previous imaging.  No evidence of instability.    Thoracic MRI shows no evidence of pathology below the VIOLETTA T2.  No sign of thoracic canal stenosis or spinal cord compression.  Overall, very reassuring study.    A>   52/m RHD with:  1.  Solid arthrodesis C5-T2, 1 year status post staged anterior posterior fusion for discitis (MSSA, cutibacterium acnes), not on antibiotics at present.  2.  Resolved recurrence of neck pain reported in November 2022, without evidence of significant thoracic pathology on MRI.    P>     Congratulated and reassured patient.  I am very happy that the recurrent neck pain symptoms he reported in November are now resolved.  I also reassured him regarding the thoracic MRI results, as well as the cervical CT scan results.  The fusion is already solid.  No evidence of thoracic pathology.  He is doing very well at this point in regards to his spine.    I am very sorry to hear about his inability to sleep related to his gastroesophageal reflux disease.  If he would like to be reevaluated by a different gastroenterology group, I encouraged him to discuss with his primary care physician (Dr. Araiza) who could perhaps refer him to an outside group, such as [facility].    Return to clinic as needed.  15 minutes spent on the date of the encounter doing chart review/review of outside records/review of test results/interpretation of tests/patient visit/documentation/discussion with other provider(s)/discussion with patient and family.      Kulwinder Calixto MD    Orthopaedic Spine Surgery  Dept Orthopaedic Surgery, [facility]  [phone] office, [phone] pager  [email or web address]

## 2023-05-13 ENCOUNTER — HEALTH MAINTENANCE LETTER (OUTPATIENT)
Age: 53
End: 2023-05-13

## 2024-07-20 ENCOUNTER — HEALTH MAINTENANCE LETTER (OUTPATIENT)
Age: 54
End: 2024-07-20

## 2025-06-17 NOTE — ANESTHESIA PREPROCEDURE EVALUATION
Anesthesia Pre-Procedure Evaluation    Patient: aDve Calero   MRN: [id number] : 1970        Preoperative Diagnosis: Cervical discitis [M46.42]  Cervical spondylosis with myelopathy [M47.12]    Procedure : Procedure(s):  Anterior cervical diskectomy and fusion, possible anterior plate fixation cervical 5 to thoracic 1 (3 levels); right or left anterior iliac crest autograft harvest.           History reviewed. No pertinent past medical history.   History reviewed. No pertinent surgical history.   No Known Allergies   Social History     Tobacco Use     Smoking status: Former Smoker     Smokeless tobacco: Never Used   Substance Use Topics     Alcohol use: Not on file      Wt Readings from Last 1 Encounters:   No data found for Wt        Anesthesia Evaluation            ROS/MED HX  ENT/Pulmonary:  - neg pulmonary ROS     Neurologic: Comment: Bilateral medial ulnar paresthesias, chronic    Neck pain    Epidural abscess      Cardiovascular:     (+) -----Previous cardiac testing   Echo: Date: Results:    Stress Test: Date: Results:    ECG Reviewed: Date: 2022 Results:  Sinus tachycardia  Cath: Date: Results:      METS/Exercise Tolerance:     Hematologic:  - neg hematologic  ROS     Musculoskeletal:  - neg musculoskeletal ROS     GI/Hepatic:  - neg GI/hepatic ROS     Renal/Genitourinary:  - neg Renal ROS     Endo:  - neg endo ROS     Psychiatric/Substance Use:  - neg psychiatric ROS     Infectious Disease:  - neg infectious disease ROS     Malignancy:  - neg malignancy ROS     Other:  - neg other ROS          Physical Exam    Airway        Mallampati: II   TM distance: > 3 FB   Neck ROM: full   Mouth opening: > 3 cm    Respiratory Devices and Support         Dental  no notable dental history         Cardiovascular          Rhythm and rate: regular and tachycardia     Pulmonary   pulmonary exam normal        breath sounds clear to auscultation           OUTSIDE LABS:  CBC:   Lab Results   Component  Patient called.  He had MRI kristan without contrast ordered by [facility].  That office wants second MRI with some contrast and patient would like to discuss this with Talia Tapia CNP.  Patient would like a call back.  Neurology medical records, etc have been requested from [facility].    Please advise.  Thank you.   Value Date    WBC 13.9 (H) 02/09/2022    HGB 12.1 (L) 02/09/2022    HCT 38.5 (L) 02/09/2022     (H) 02/09/2022     BMP:   Lab Results   Component Value Date     02/09/2022    POTASSIUM 3.4 02/09/2022    CHLORIDE 102 02/09/2022    CO2 30 02/09/2022    BUN 12 02/09/2022    CR 0.83 02/09/2022     (H) 02/09/2022     (H) 02/09/2022     COAGS:   Lab Results   Component Value Date    INR 0.98 02/09/2022     POC: No results found for: BGM, HCG, HCGS  HEPATIC:   Lab Results   Component Value Date    ALBUMIN 2.8 (L) 02/09/2022    PROTTOTAL 8.5 02/09/2022    ALT 44 02/09/2022    AST 12 02/09/2022    ALKPHOS 133 02/09/2022    BILITOTAL 0.3 02/09/2022     OTHER:   Lab Results   Component Value Date    LACT 1.5 02/09/2022    VIANEY 9.8 02/09/2022    CRP 74.0 (H) 02/09/2022       Anesthesia Plan    ASA Status:  3   NPO Status:  NPO Appropriate    Anesthesia Type: General.     - Airway: ETT   Induction: Intravenous.   Maintenance: Balanced.   Techniques and Equipment:     - Airway: Video-Laryngoscope     - Lines/Monitors: 2nd IV, Arterial Line     Consents    Anesthesia Plan(s) and associated risks, benefits, and realistic alternatives discussed. Questions answered and patient/representative(s) expressed understanding.     - Discussed: Risks, Benefits and Alternatives for BOTH SEDATION and the PROCEDURE were discussed     - Discussed with:  Patient      - Extended Intubation/Ventilatory Support Discussed: Yes.    Use of blood products discussed: Yes.     - Discussed with: Patient.     - Consented: consented to blood products            Reason for refusal: other.     Postoperative Care    Pain management: Multi-modal analgesia.   PONV prophylaxis: Ondansetron (or other 5HT-3), Dexamethasone or Solumedrol, Background Propofol Infusion     Comments:                Song Cazares MD

## 2025-08-09 ENCOUNTER — HEALTH MAINTENANCE LETTER (OUTPATIENT)
Age: 55
End: 2025-08-09

## (undated) DEVICE — DRSG GAUZE 4X8" NON21842

## (undated) DEVICE — ADH SKIN CLOSURE PREMIERPRO EXOFIN MICOR HV 0.5ML 3471

## (undated) DEVICE — DRSG GAUZE 2X2" 8042

## (undated) DEVICE — SPONGE COTTONOID 1/2X1/2" 20-04S

## (undated) DEVICE — DRAPE MICROSCOPE LEICA 54X120" 09-MK653

## (undated) DEVICE — DRSG TEGADERM 4X10" 1627

## (undated) DEVICE — Device

## (undated) DEVICE — DRSG TEGADERM 4X4 3/4" 1626W

## (undated) DEVICE — CELL SAVER

## (undated) DEVICE — ESU ELEC BLADE 2.75" COATED/INSULATED E1455

## (undated) DEVICE — SU VICRYL 1 CT-1 CR 8X18" J741D

## (undated) DEVICE — DRAPE O ARM TUBE 9732722

## (undated) DEVICE — DRSG PRIMAPORE 02X3" 7133

## (undated) DEVICE — SYR 10ML FINGER CONTROL W/O NDL 309695

## (undated) DEVICE — ADH SKIN CLOSURE PREMIERPRO EXOFIN 1.0ML 3470

## (undated) DEVICE — SU VICRYL 2-0 CP-2 27" UND J869H

## (undated) DEVICE — GLOVE PROTEXIS MICRO 7.0  2D73PM70

## (undated) DEVICE — DRAPE MAYO STAND 23X54 8337

## (undated) DEVICE — IOM SUPPLIES/CASE FEE

## (undated) DEVICE — SYR 10ML LL W/O NDL 302995

## (undated) DEVICE — SU MONOCRYL 4-0 PS-2 27" UND Y426H

## (undated) DEVICE — SPONGE LAP 18X18" X8435

## (undated) DEVICE — PREP CHLORAPREP 26ML TINTED HI-LITE ORANGE 930815

## (undated) DEVICE — DRAIN JACKSON PRATT RESERVOIR 100ML SU130-1305

## (undated) DEVICE — CATH TRAY FOLEY SURESTEP 16FR WDRAIN BAG STLK LATEX A300316A

## (undated) DEVICE — LINEN TOWEL PACK X30 5481

## (undated) DEVICE — SOL WATER IRRIG 1000ML BOTTLE 2F7114

## (undated) DEVICE — SYR 50ML LL W/O NDL 309653

## (undated) DEVICE — LINEN GOWN X4 5410

## (undated) DEVICE — SU MONOCRYL 3-0 PS-2 18" UND Y497G

## (undated) DEVICE — SU MONOCRYL 3-0 PS-1 27" Y936H

## (undated) DEVICE — CATH TRAY FOLEY SURESTEP 16FR W/TMP PRB STLK LATEX A319416AM

## (undated) DEVICE — ESU PENCIL SMOKE EVAC W/ROCKER SWITCH 0703-047-000

## (undated) DEVICE — LINEN BACK PACK 5440

## (undated) DEVICE — 8MM BONE GRAFT DRILL

## (undated) DEVICE — TUBING SUCTION MEDI-VAC SOFT 3/16"X20' N520A

## (undated) DEVICE — SU VICRYL 0 CT-1 27" J340H

## (undated) DEVICE — DRSG TEGADERM 2 3/8X2 3/4" 1624W

## (undated) DEVICE — DRAPE LAP W/ARMBOARD 29410

## (undated) DEVICE — LINEN TOWEL PACK X5 5464

## (undated) DEVICE — TOOL DISSECT MIDAS MR8 14CM MATCH HEAD 3MM MR8-14MH30

## (undated) DEVICE — DRAPE SHEET REV FOLD 3/4 9349

## (undated) DEVICE — GLOVE PROTEXIS BLUE W/NEU-THERA 8.5  2D73EB85

## (undated) DEVICE — KIT PATIENT CARE PROAXIS SYSTEM 6988-PV-ACP

## (undated) DEVICE — SU VICRYL 0 CT-1 27" UND J260H

## (undated) DEVICE — BUR STRK PRECISION MATCH HEAD 3.0MM 8450-107-530

## (undated) DEVICE — SU SILK 2-0 SH 30" K833H

## (undated) DEVICE — SPONGE KITTNER 30-101

## (undated) DEVICE — SU VICRYL 2-0 CT-1 27" UND J259H

## (undated) DEVICE — MARKER SPHERES PASSIVE MEDT PACK 5 8801075

## (undated) DEVICE — GLOVE PROTEXIS BLUE W/NEU-THERA 7.5  2D73EB75

## (undated) DEVICE — POSITIONER ARMBOARD FOAM 1PAIR LF FP-ARMB1

## (undated) DEVICE — NDL SPINAL 18GA 3.5" 405184

## (undated) DEVICE — SOL ISOPROPYL RUBBING ALCOHOL USP 70% 4OZ HDX-20 I0020

## (undated) DEVICE — SPONGE SURGIFOAM 100 1974

## (undated) DEVICE — DRSG PRIMAPORE 03 1/8X6" 66000318

## (undated) DEVICE — CUP AND LID 2PK 2OZ STERILE  SSK9006A

## (undated) DEVICE — DRAPE POUCH INSTRUMENT 1018

## (undated) DEVICE — BLADE BONE MILL STRK 5.0MM MED 5400-701-000

## (undated) DEVICE — GLOVE PROTEXIS MICRO 8.0  2D73PM80

## (undated) DEVICE — SOL NACL 0.9% IRRIG 1000ML BOTTLE 2F7124

## (undated) DEVICE — TEST TUBE W/SCREW CAP 17361

## (undated) DEVICE — SU VICRYL 3-0 SH 27" UND J416H

## (undated) DEVICE — SU VICRYL 2-0 CT-2 27" UND J269H

## (undated) DEVICE — DRAIN JACKSON PRATT 07FR ROUND SU130-1320

## (undated) DEVICE — RX BACITRACIN OINTMENT 0.9G 1/32OZ CUR001109

## (undated) DEVICE — SUCTION MINISQUAIR SMOKE EVAC CAPTURE DEVICE SQ20012-01

## (undated) RX ORDER — ONDANSETRON 2 MG/ML
INJECTION INTRAMUSCULAR; INTRAVENOUS
Status: DISPENSED
Start: 2022-10-13

## (undated) RX ORDER — LIDOCAINE HYDROCHLORIDE 20 MG/ML
INJECTION, SOLUTION EPIDURAL; INFILTRATION; INTRACAUDAL; PERINEURAL
Status: DISPENSED
Start: 2022-02-15

## (undated) RX ORDER — PROPOFOL 10 MG/ML
INJECTION, EMULSION INTRAVENOUS
Status: DISPENSED
Start: 2022-02-09

## (undated) RX ORDER — OXYCODONE HYDROCHLORIDE 5 MG/1
TABLET ORAL
Status: DISPENSED
Start: 2022-02-12

## (undated) RX ORDER — FENTANYL CITRATE 50 UG/ML
INJECTION, SOLUTION INTRAMUSCULAR; INTRAVENOUS
Status: DISPENSED
Start: 2022-02-12

## (undated) RX ORDER — ONDANSETRON 2 MG/ML
INJECTION INTRAMUSCULAR; INTRAVENOUS
Status: DISPENSED
Start: 2022-02-15

## (undated) RX ORDER — ACETAMINOPHEN 325 MG/1
TABLET ORAL
Status: DISPENSED
Start: 2022-02-15

## (undated) RX ORDER — BUPIVACAINE HYDROCHLORIDE AND EPINEPHRINE 2.5; 5 MG/ML; UG/ML
INJECTION, SOLUTION EPIDURAL; INFILTRATION; INTRACAUDAL; PERINEURAL
Status: DISPENSED
Start: 2022-02-09

## (undated) RX ORDER — METHOCARBAMOL 750 MG/1
TABLET, FILM COATED ORAL
Status: DISPENSED
Start: 2022-02-12

## (undated) RX ORDER — CEFAZOLIN SODIUM 1 G/50ML
SOLUTION INTRAVENOUS
Status: DISPENSED
Start: 2022-02-09

## (undated) RX ORDER — LORAZEPAM 0.5 MG/1
TABLET ORAL
Status: DISPENSED
Start: 2022-02-12

## (undated) RX ORDER — OXYCODONE HYDROCHLORIDE 10 MG/1
TABLET ORAL
Status: DISPENSED
Start: 2022-02-15

## (undated) RX ORDER — VANCOMYCIN HYDROCHLORIDE 1 G/20ML
INJECTION, POWDER, LYOPHILIZED, FOR SOLUTION INTRAVENOUS
Status: DISPENSED
Start: 2022-02-09

## (undated) RX ORDER — DEXTROSE MONOHYDRATE, SODIUM CHLORIDE, AND POTASSIUM CHLORIDE 50; 1.49; 4.5 G/1000ML; G/1000ML; G/1000ML
INJECTION, SOLUTION INTRAVENOUS
Status: DISPENSED
Start: 2022-02-10

## (undated) RX ORDER — FENTANYL CITRATE 50 UG/ML
INJECTION, SOLUTION INTRAMUSCULAR; INTRAVENOUS
Status: DISPENSED
Start: 2022-02-09

## (undated) RX ORDER — ACETAMINOPHEN 325 MG/1
TABLET ORAL
Status: DISPENSED
Start: 2022-02-09

## (undated) RX ORDER — GABAPENTIN 300 MG/1
CAPSULE ORAL
Status: DISPENSED
Start: 2022-02-09

## (undated) RX ORDER — ACETAMINOPHEN 325 MG/1
TABLET ORAL
Status: DISPENSED
Start: 2022-02-12

## (undated) RX ORDER — LIDOCAINE HYDROCHLORIDE 20 MG/ML
SOLUTION OROPHARYNGEAL
Status: DISPENSED
Start: 2022-02-15

## (undated) RX ORDER — PROPOFOL 10 MG/ML
INJECTION, EMULSION INTRAVENOUS
Status: DISPENSED
Start: 2022-02-15

## (undated) RX ORDER — HYDROMORPHONE HCL IN WATER/PF 6 MG/30 ML
PATIENT CONTROLLED ANALGESIA SYRINGE INTRAVENOUS
Status: DISPENSED
Start: 2022-02-15

## (undated) RX ORDER — VANCOMYCIN HYDROCHLORIDE 1 G/20ML
INJECTION, POWDER, LYOPHILIZED, FOR SOLUTION INTRAVENOUS
Status: DISPENSED
Start: 2022-02-12